# Patient Record
Sex: FEMALE | Race: BLACK OR AFRICAN AMERICAN | Employment: OTHER | URBAN - METROPOLITAN AREA
[De-identification: names, ages, dates, MRNs, and addresses within clinical notes are randomized per-mention and may not be internally consistent; named-entity substitution may affect disease eponyms.]

---

## 2017-02-23 ENCOUNTER — OFFICE VISIT (OUTPATIENT)
Dept: FAMILY MEDICINE CLINIC | Age: 82
End: 2017-02-23

## 2017-02-23 VITALS
HEART RATE: 60 BPM | OXYGEN SATURATION: 92 % | SYSTOLIC BLOOD PRESSURE: 128 MMHG | HEIGHT: 66 IN | WEIGHT: 191.4 LBS | RESPIRATION RATE: 18 BRPM | BODY MASS INDEX: 30.76 KG/M2 | TEMPERATURE: 98 F | DIASTOLIC BLOOD PRESSURE: 60 MMHG

## 2017-02-23 DIAGNOSIS — K64.4 EXTERNAL HEMORRHOID: ICD-10-CM

## 2017-02-23 DIAGNOSIS — K62.5 RECTAL BLEEDING: Primary | ICD-10-CM

## 2017-02-23 RX ORDER — DOCUSATE SODIUM 100 MG/1
100 CAPSULE, LIQUID FILLED ORAL 2 TIMES DAILY
Qty: 30 CAP | Refills: 5 | Status: SHIPPED | OUTPATIENT
Start: 2017-02-23 | End: 2017-05-24

## 2017-02-23 NOTE — PROGRESS NOTES
HISTORY OF PRESENT ILLNESS  Muriel Damian is a 80 y.o. female. HPI Has been having some blood in stools since Tuesday. Passed a fair amount on several occasions. Went to ER on Tuesday. No abdominal pain. Feels a lump on rectal area. Had a colonoscopy in 2007, says that they couldn't get as far as they wanted to. No lightheadedness or dizziness. ROS    Physical Exam   Constitutional: She is oriented to person, place, and time. She appears well-developed and well-nourished. Neck: No thyromegaly present. Cardiovascular: Normal rate, regular rhythm and normal heart sounds. No murmur heard. Pulmonary/Chest: Effort normal and breath sounds normal. She has no wheezes. Abdominal: Soft. Bowel sounds are normal. She exhibits no distension. There is no tenderness. There is no rebound and no guarding. Genitourinary:   Genitourinary Comments: Rectal- thrombosed ext. hemorrhoid   Musculoskeletal: Normal range of motion. She exhibits no edema. Lymphadenopathy:     She has no cervical adenopathy. Neurological: She is alert and oriented to person, place, and time. Nursing note and vitals reviewed. ASSESSMENT and PLAN  Orders Placed This Encounter    REFERRAL TO GASTROENTEROLOGY    AMB POC COMPLETE CBC, AUTOMATED    docusate sodium (COLACE) 100 mg capsule     Adela was seen today for rectal bleeding. Diagnoses and all orders for this visit:    Rectal bleeding  -     AMB POC COMPLETE CBC, AUTOMATED  -     REFERRAL TO GASTROENTEROLOGY    External hemorrhoid    Other orders  -     docusate sodium (COLACE) 100 mg capsule; Take 1 Cap by mouth two (2) times a day for 90 days.       Follow-up Disposition: Not on File

## 2017-02-23 NOTE — PROGRESS NOTES
Chief Complaint   Patient presents with    Rectal Bleeding     Pt stated she was straining having a bowel movement on Tuesday and noticed afterwards when wiping that she had blood coming from rectum. .     Pt stated she only notice alittle bit today when wiped. Pt didn't she hasnt had  A hx of hemmorhoids before. Pt stated she has been straining lately when having bowel movements and would like stool softner. Discussed health maintenance with pt. Pt aware eye exam is due.

## 2017-03-01 ENCOUNTER — TELEPHONE (OUTPATIENT)
Dept: FAMILY MEDICINE CLINIC | Age: 82
End: 2017-03-01

## 2017-03-01 NOTE — TELEPHONE ENCOUNTER
Pt was talking to a nurse, but she do not know which nurse she was talking to, but she would like a call back.

## 2017-05-25 ENCOUNTER — HOSPITAL ENCOUNTER (OUTPATIENT)
Dept: LAB | Age: 82
Discharge: HOME | End: 2017-05-25
Payer: MEDICARE

## 2017-05-25 ENCOUNTER — OFFICE VISIT (OUTPATIENT)
Dept: FAMILY MEDICINE CLINIC | Age: 82
End: 2017-05-25

## 2017-05-25 VITALS
WEIGHT: 195.4 LBS | DIASTOLIC BLOOD PRESSURE: 58 MMHG | HEART RATE: 68 BPM | BODY MASS INDEX: 31.4 KG/M2 | RESPIRATION RATE: 14 BRPM | TEMPERATURE: 100.3 F | SYSTOLIC BLOOD PRESSURE: 134 MMHG | HEIGHT: 66 IN | OXYGEN SATURATION: 96 %

## 2017-05-25 DIAGNOSIS — M25.572 ACUTE LEFT ANKLE PAIN: Primary | ICD-10-CM

## 2017-05-25 PROCEDURE — 36415 COLL VENOUS BLD VENIPUNCTURE: CPT

## 2017-05-25 PROCEDURE — 84550 ASSAY OF BLOOD/URIC ACID: CPT

## 2017-05-25 RX ORDER — ALLOPURINOL 100 MG/1
100 TABLET ORAL DAILY
Qty: 90 TAB | Refills: 3 | Status: SHIPPED | OUTPATIENT
Start: 2017-05-25 | End: 2018-07-27 | Stop reason: SDUPTHER

## 2017-05-25 RX ORDER — PREDNISONE 20 MG/1
20 TABLET ORAL 2 TIMES DAILY
Qty: 10 TAB | Refills: 0 | Status: SHIPPED | OUTPATIENT
Start: 2017-05-25 | End: 2017-07-06 | Stop reason: ALTCHOICE

## 2017-05-25 RX ORDER — POLYETHYLENE GLYCOL 3350 17 G/17G
POWDER, FOR SOLUTION ORAL
Refills: 3 | COMMUNITY
Start: 2017-03-03 | End: 2018-11-29 | Stop reason: ALTCHOICE

## 2017-05-25 RX ORDER — HYDROCORTISONE 25 MG/G
CREAM TOPICAL
Refills: 0 | COMMUNITY
Start: 2017-02-21 | End: 2018-11-29 | Stop reason: ALTCHOICE

## 2017-05-25 NOTE — MR AVS SNAPSHOT
Visit Information Date & Time Provider Department Dept. Phone Encounter #  
 5/25/2017  4:00 PM Armani Lindquist MD [facility] [phone] [id number] Your Appointments 6/15/2017  9:45 AM  
ROUTINE CARE with Armani Lindquist MD  
[facility]) Appt Note: 6m f/u  
 [address] [facility] [address] [phone] [address] [place] [address] [address] Upcoming Health Maintenance Date Due  
 EYE EXAM RETINAL OR DILATED Q1 8/11/2015 GLAUCOMA SCREENING Q2Y 8/11/2016 MEDICARE YEARLY EXAM 12/17/2016 HEMOGLOBIN A1C Q6M 6/15/2017 INFLUENZA AGE 9 TO ADULT 8/1/2017 FOOT EXAM Q1 12/15/2017 MICROALBUMIN Q1 12/15/2017 LIPID PANEL Q1 12/15/2017 DTaP/Tdap/Td series (2 - Td) 6/16/2026 Allergies as of 5/25/2017  Review Complete On: 5/25/2017 By: Armani Lindquist MD  
  
 Severity Noted Reaction Type Reactions Contrast Agent [Iodine]  05/01/2012    Other (comments) Affects kidneys Morphine  05/01/2012    Other (comments) Pt does not know the reaction Current Immunizations  Reviewed on 12/17/2015 Name Date Pneumococcal Vaccine (Unspecified Type) 9/1/2011 Not reviewed this visit You Were Diagnosed With   
  
 Codes Comments Acute left ankle pain    -  Primary ICD-10-CM: M25.572 ICD-9-CM: 719.47 Vitals BP Pulse Temp Resp Height(growth percentile) Weight(growth percentile) 134/58 68 100.3 °F (37.9 °C) (Oral) 14 5' 6\" (1.676 m) 195 lb 6.4 oz (88.6 kg) SpO2 BMI OB Status Smoking Status 96% 31.54 kg/m2 Hysterectomy Never Smoker Vitals History BMI and BSA Data Body Mass Index Body Surface Area 31.54 kg/m 2 2.03 m 2 Preferred Pharmacy Pharmacy Name Phone [facility] [address] [place] [address] [phone] Your Updated Medication List  
  
   
 This list is accurate as of: 5/25/17  4:58 PM.  Always use your most recent med list.  
  
  
  
  
 acetaminophen-codeine 300-30 mg per tablet Commonly known as:  TYLENOL #3  
TAKE 1 TO 2 TABLETS BY MOUTH EVERY 6 HOURS AS NEEDED FOR PAIN  
  
 allopurinol 100 mg tablet Commonly known as:  Judy Roberto Take 1 Tab by mouth daily. To prevent gout  
  
 amLODIPine 10 mg tablet Commonly known as:  Eward Jeannine Take 1 Tab by mouth daily. TAKE 1 TABLET BY MOUTH EVERY DAY  
  
 aspirin 81 mg tablet Take 81 mg by mouth. atorvastatin 20 mg tablet Commonly known as:  LIPITOR Take 1 Tab by mouth daily. For cholesterol  
  
 chlorthalidone 25 mg tablet Commonly known as:  Lennart Specter One daily for blood pressure  
  
 clotrimazole-betamethasone topical cream  
Commonly known as:  Servando Ort Apply to affected areas twice daily  
  
 hydrocortisone 2.5 % topical cream  
Commonly known as:  HYTONE  
APPLY 3 TIMES DAILY FOR 14 DAYS AS NEEDED FOR RECTAL PAIN  
  
 insulin  unit/mL injection Commonly known as:  NovoLIN N  
INJECT 18 UNITS SUBQ IN THE MORNING AND 9 UNITS IN THE EVENING Insulin Syringe-Needle U-100 1/2 mL 30 gauge x 1/2\" Syrg Commonly known as:  BD INSULIN SYRINGE ULTRA-FINE  
USE AS DIRECTED WITH INSULIN  
  
 losartan 100 mg tablet Commonly known as:  COZAAR  
TAKE 1 TABLET BY MOUTH EVERY DAY  
  
 polyethylene glycol 17 gram/dose powder Commonly known as:  Beuford Hallmark TAKE 17GRAMS (1 CAPFUL) DAILY MIXED INTO 6-8 OUNCES OF ATER OR JUICE  
  
 predniSONE 20 mg tablet Commonly known as:  Tanja Frost Take 1 Tab by mouth two (2) times a day. Prescriptions Sent to Pharmacy Refills  
 allopurinol (ZYLOPRIM) 100 mg tablet 3 Sig: Take 1 Tab by mouth daily. To prevent gout Class: Normal  
 Pharmacy: [address] [place] [address], [address] [place] [address] Ph #: [phone]  Route: Oral  
 predniSONE (DELTASONE) 20 mg tablet 0  
 Sig: Take 1 Tab by mouth two (2) times a day. Class: Normal  
 Pharmacy: [address] [place] [address], [address] [place] [address] #: [phone] Route: Oral  
  
We Performed the Following URIC ACID [id number] CPT(R)] Introducing [facility] SERVICES! Dear Celestino Amato: Thank you for requesting a [employer] account. Our records indicate that you have previously registered for a [employer] account but its currently inactive. Please call our [employer] support line at [phone]. Additional Information If you have questions, please visit the Frequently Asked Questions section of the [employer] website at [email or web address] Remember, [employer] is NOT to be used for urgent needs. For medical emergencies, dial 911. Now available from your iPhone and Android! Please provide this summary of care documentation to your next provider. Your primary care clinician is listed as Jerad Rowan. If you have any questions after today's visit, please call [phone].

## 2017-05-25 NOTE — PROGRESS NOTES
Chief Complaint   Patient presents with    Foot Pain     Hurts to walk    Fever     temp 100.3 oral.  upon arrival    Ankle swelling    Insomnia     pain is affectin pt's sleep     1. Have you been to the ER, urgent care clinic since your last visit? Hospitalized since your last visit? No    2. Have you seen or consulted any other health care providers outside of the [address] [place] [address] since your last visit? Include any pap smears or colon screening. No     Health Maintenance Due   Topic Date Due    EYE EXAM RETINAL OR DILATED Q1  08/11/2015    GLAUCOMA SCREENING Q2Y  08/11/2016    MEDICARE YEARLY EXAM  12/17/2016    HEMOGLOBIN A1C Q6M  06/15/2017       Pt wants to focus on the foot pain issue.   Pt expressed interest in retinal scan for the next visit in June 2017

## 2017-05-25 NOTE — PROGRESS NOTES
HISTORY OF PRESENT ILLNESS  Fazal Mtz is a 80 y.o. female. HPI Has been having pain and swelling in L foot since Tuesday, no hx trauma. Has been dxed with the gout in R foot a year ago. Lower leg is hurting also. Tylenol #3- no relief. Has also been running a low grade fever. No other joints are hurting. Went to Dr. Graciela Aviles office today, had a vascular test today. Sees him on Tuesday. ROS    Physical Exam   Constitutional: She is oriented to person, place, and time. She appears well-developed and well-nourished. Neck: No thyromegaly present. Cardiovascular: Normal rate, regular rhythm and normal heart sounds. No murmur heard. Pulmonary/Chest: Effort normal and breath sounds normal. She has no wheezes. Abdominal: Soft. Bowel sounds are normal. She exhibits no distension. There is no tenderness. There is no rebound and no guarding. Musculoskeletal: Normal range of motion. She exhibits no edema. L ankle- marked swelling, tenderness   Lymphadenopathy:     She has no cervical adenopathy. Neurological: She is alert and oriented to person, place, and time. Nursing note and vitals reviewed. ASSESSMENT and PLAN  Orders Placed This Encounter    URIC ACID    allopurinol (ZYLOPRIM) 100 mg tablet    predniSONE (DELTASONE) 20 mg tablet     Adela was seen today for foot pain, fever, ankle swelling and insomnia. Diagnoses and all orders for this visit:    Acute left ankle pain  -     URIC ACID    Other orders  -     allopurinol (ZYLOPRIM) 100 mg tablet; Take 1 Tab by mouth daily. To prevent gout  -     predniSONE (DELTASONE) 20 mg tablet; Take 1 Tab by mouth two (2) times a day.       Follow-up Disposition: Not on File

## 2017-05-26 LAB — URATE SERPL-MCNC: 7.7 MG/DL (ref 2.5–7.1)

## 2017-06-13 RX ORDER — AMLODIPINE BESYLATE 10 MG/1
10 TABLET ORAL DAILY
Qty: 90 TAB | Refills: 3 | Status: SHIPPED | OUTPATIENT
Start: 2017-06-13 | End: 2018-06-12 | Stop reason: SDUPTHER

## 2017-06-13 RX ORDER — AMLODIPINE BESYLATE 10 MG/1
10 TABLET ORAL DAILY
Qty: 90 TAB | Refills: 3 | Status: CANCELLED | OUTPATIENT
Start: 2017-06-13

## 2017-07-06 ENCOUNTER — HOSPITAL ENCOUNTER (OUTPATIENT)
Dept: LAB | Age: 82
Discharge: HOME | End: 2017-07-06
Payer: MEDICARE

## 2017-07-06 ENCOUNTER — OFFICE VISIT (OUTPATIENT)
Dept: FAMILY MEDICINE CLINIC | Age: 82
End: 2017-07-06

## 2017-07-06 VITALS
OXYGEN SATURATION: 94 % | HEIGHT: 66 IN | RESPIRATION RATE: 14 BRPM | TEMPERATURE: 97 F | SYSTOLIC BLOOD PRESSURE: 143 MMHG | BODY MASS INDEX: 31.02 KG/M2 | HEART RATE: 61 BPM | WEIGHT: 193 LBS | DIASTOLIC BLOOD PRESSURE: 65 MMHG

## 2017-07-06 DIAGNOSIS — M48.07 SPINAL STENOSIS OF LUMBOSACRAL REGION: ICD-10-CM

## 2017-07-06 DIAGNOSIS — E78.00 HYPERCHOLESTEROLEMIA: ICD-10-CM

## 2017-07-06 DIAGNOSIS — I10 ESSENTIAL HYPERTENSION: ICD-10-CM

## 2017-07-06 DIAGNOSIS — E11.9 TYPE 2 DIABETES MELLITUS WITHOUT COMPLICATION, WITH LONG-TERM CURRENT USE OF INSULIN (HCC): Primary | ICD-10-CM

## 2017-07-06 DIAGNOSIS — Z79.4 TYPE 2 DIABETES MELLITUS WITHOUT COMPLICATION, WITH LONG-TERM CURRENT USE OF INSULIN (HCC): Primary | ICD-10-CM

## 2017-07-06 LAB — HBA1C MFR BLD HPLC: 6.4 %

## 2017-07-06 PROCEDURE — 85025 COMPLETE CBC W/AUTO DIFF WBC: CPT

## 2017-07-06 PROCEDURE — 82043 UR ALBUMIN QUANTITATIVE: CPT

## 2017-07-06 PROCEDURE — 36415 COLL VENOUS BLD VENIPUNCTURE: CPT

## 2017-07-06 PROCEDURE — 80061 LIPID PANEL: CPT

## 2017-07-06 PROCEDURE — 80053 COMPREHEN METABOLIC PANEL: CPT

## 2017-07-06 RX ORDER — DOCUSATE SODIUM 100 MG/1
CAPSULE, LIQUID FILLED ORAL
Refills: 4 | COMMUNITY
Start: 2017-06-19 | End: 2017-07-06 | Stop reason: SDUPTHER

## 2017-07-06 RX ORDER — CHLORTHALIDONE 25 MG/1
TABLET ORAL
Qty: 90 TAB | Refills: 3 | Status: SHIPPED | OUTPATIENT
Start: 2017-07-06 | End: 2017-08-25 | Stop reason: SDUPTHER

## 2017-07-06 RX ORDER — ACETAMINOPHEN AND CODEINE PHOSPHATE 300; 30 MG/1; MG/1
TABLET ORAL
Qty: 90 TAB | Refills: 3 | Status: SHIPPED | OUTPATIENT
Start: 2017-07-06 | End: 2018-07-27 | Stop reason: SDUPTHER

## 2017-07-06 RX ORDER — ATORVASTATIN CALCIUM 20 MG/1
20 TABLET, FILM COATED ORAL DAILY
Qty: 90 TAB | Refills: 3 | Status: SHIPPED | OUTPATIENT
Start: 2017-07-06 | End: 2018-08-06 | Stop reason: SDUPTHER

## 2017-07-06 RX ORDER — DOCUSATE SODIUM 100 MG/1
CAPSULE, LIQUID FILLED ORAL
Qty: 60 CAP | Refills: 12 | Status: SHIPPED | OUTPATIENT
Start: 2017-07-06 | End: 2018-11-29 | Stop reason: ALTCHOICE

## 2017-07-06 NOTE — PROGRESS NOTES
HISTORY OF PRESENT ILLNESS  Tami Thomas is a 80 y.o. female. HPI Pt. Comes in for blood pressure, cholesterol, and diabetes check. No complaints of chest pain, shortness of breath, TIAs, claudication or edema. Has been having painns in posterior thighs for over a month, somewhat worse in the last week. tyl - mild relief. Legs feel tired when walking around kitchen. Also has some lower back pain. No nighttime pain. ROS    Physical Exam   Constitutional: She is oriented to person, place, and time. She appears well-developed and well-nourished. Neck: No thyromegaly present. Cardiovascular: Normal rate, regular rhythm and normal heart sounds. No murmur heard. 1+ dp pulses bilaterally. Pulmonary/Chest: Effort normal and breath sounds normal. She has no wheezes. Abdominal: Soft. Bowel sounds are normal. She exhibits no distension. There is no tenderness. There is no rebound and no guarding. Musculoskeletal: Normal range of motion. She exhibits no edema. Lymphadenopathy:     She has no cervical adenopathy. Neurological: She is alert and oriented to person, place, and time. Nursing note and vitals reviewed. ASSESSMENT and PLAN  Orders Placed This Encounter    CBC WITH AUTOMATED DIFF    METABOLIC PANEL, COMPREHENSIVE    LIPID PANEL    MICROALBUMIN, UR, RAND W/ MICROALBUMIN/CREA RATIO    AMB POC HEMOGLOBIN A1C    HM DIABETES FOOT EXAM    insulin NPH (NOVOLIN N) 100 unit/mL injection    atorvastatin (LIPITOR) 20 mg tablet    chlorthalidone (HYGROTEN) 25 mg tablet    acetaminophen-codeine (TYLENOL #3) 300-30 mg per tablet    docusate sodium (COLACE) 100 mg capsule     Adela was seen today for hypertension, diabetes and cholesterol problem.     Diagnoses and all orders for this visit:    Type 2 diabetes mellitus without complication, with long-term current use of insulin ([facility])  -     CBC WITH AUTOMATED DIFF  -     METABOLIC PANEL, COMPREHENSIVE  -     LIPID PANEL  -     AMB POC HEMOGLOBIN A1C  -     MICROALBUMIN, UR, RAND W/ MICROALBUMIN/CREA RATIO  -     HM DIABETES FOOT EXAM    Hypercholesterolemia    Spinal stenosis of lumbosacral region    Essential hypertension    Other orders  -     insulin NPH (NOVOLIN N) 100 unit/mL injection; Inject 24 units in am and 12 units in pm  -     atorvastatin (LIPITOR) 20 mg tablet; Take 1 Tab by mouth daily. For cholesterol  -     chlorthalidone (HYGROTEN) 25 mg tablet; One daily for blood pressure  -     acetaminophen-codeine (TYLENOL #3) 300-30 mg per tablet; TAKE 1 TO 2 TABLETS BY MOUTH EVERY 6 HOURS AS NEEDED FOR PAIN  -     docusate sodium (COLACE) 100 mg capsule; TAKE 1 CAPSULE BY MOUTH TWICE DAILY      Follow-up Disposition:  Return in about 6 months (around 1/6/2018).

## 2017-07-06 NOTE — MR AVS SNAPSHOT
Visit Information Date & Time Provider Department Dept. Phone Encounter #  
 7/6/2017  9:45 AM Evy Toledo MD [facility] [phone] [id number] Follow-up Instructions Return in about 6 months (around 1/6/2018). Upcoming Health Maintenance Date Due  
 EYE EXAM RETINAL OR DILATED Q1 8/11/2015 GLAUCOMA SCREENING Q2Y 8/11/2016 MEDICARE YEARLY EXAM 12/17/2016 HEMOGLOBIN A1C Q6M 6/15/2017 INFLUENZA AGE 9 TO ADULT 8/1/2017 FOOT EXAM Q1 12/15/2017 MICROALBUMIN Q1 12/15/2017 LIPID PANEL Q1 12/15/2017 DTaP/Tdap/Td series (2 - Td) 6/16/2026 Allergies as of 7/6/2017  Review Complete On: 7/6/2017 By: Evy Toledo MD  
  
 Severity Noted Reaction Type Reactions Contrast Agent [Iodine]  05/01/2012    Other (comments) Affects kidneys Morphine  05/01/2012    Other (comments) Pt does not know the reaction Current Immunizations  Reviewed on 12/17/2015 Name Date Pneumococcal Vaccine (Unspecified Type) 9/1/2011 Not reviewed this visit You Were Diagnosed With   
  
 Codes Comments Type 2 diabetes mellitus without complication, with long-term current use of insulin ([facility])    -  Primary ICD-10-CM: E11.9, Z79.4 ICD-9-CM: 250.00, V58.67 Hypercholesterolemia     ICD-10-CM: E78.00 ICD-9-CM: 272.0 Spinal stenosis of lumbosacral region     ICD-10-CM: M48.07 
ICD-9-CM: 724.02 Essential hypertension     ICD-10-CM: I10 
ICD-9-CM: 401.9 Vitals BP Pulse Temp Resp Height(growth percentile) Weight(growth percentile) 143/65 61 97 °F (36.1 °C) (Oral) 14 5' 6\" (1.676 m) 193 lb (87.5 kg) SpO2 BMI OB Status Smoking Status 94% 31.15 kg/m2 Hysterectomy Never Smoker BMI and BSA Data Body Mass Index Body Surface Area  
 31.15 kg/m 2 2.02 m 2 Preferred Pharmacy [facility] [address] [place] [address] [phone] Your Updated Medication List  
  
   
This list is accurate as of: 7/6/17 10:10 AM.  Always use your most recent med list.  
  
  
  
  
 acetaminophen-codeine 300-30 mg per tablet Commonly known as:  TYLENOL #3  
TAKE 1 TO 2 TABLETS BY MOUTH EVERY 6 HOURS AS NEEDED FOR PAIN  
  
 allopurinol 100 mg tablet Commonly known as:  Cherie Wilmot Take 1 Tab by mouth daily. To prevent gout  
  
 amLODIPine 10 mg tablet Commonly known as:  Barry Shield Take 1 Tab by mouth daily. TAKE 1 TABLET BY MOUTH EVERY DAY  
  
 aspirin 81 mg tablet Take 81 mg by mouth. atorvastatin 20 mg tablet Commonly known as:  LIPITOR Take 1 Tab by mouth daily. For cholesterol  
  
 chlorthalidone 25 mg tablet Commonly known as:  Charlesetta Clear One daily for blood pressure  
  
 clotrimazole-betamethasone topical cream  
Commonly known as:  Dorlene Dear Apply to affected areas twice daily  
  
 docusate sodium 100 mg capsule Commonly known as:  COLACE  
TAKE 1 CAPSULE BY MOUTH TWICE DAILY  
  
 hydrocortisone 2.5 % topical cream  
Commonly known as:  HYTONE  
APPLY 3 TIMES DAILY FOR 14 DAYS AS NEEDED FOR RECTAL PAIN  
  
 insulin  unit/mL injection Commonly known as:  NovoLIN N Inject 24 units in am and 12 units in pm  
  
 Insulin Syringe-Needle U-100 1/2 mL 30 gauge x 1/2\" Syrg Commonly known as:  BD INSULIN SYRINGE ULTRA-FINE  
USE AS DIRECTED WITH INSULIN  
  
 losartan 100 mg tablet Commonly known as:  COZAAR  
TAKE 1 TABLET BY MOUTH EVERY DAY  
  
 polyethylene glycol 17 gram/dose powder Commonly known as:  Po Spaniel TAKE 17GRAMS (1 CAPFUL) DAILY MIXED INTO 6-8 OUNCES OF ATER OR JUICE Prescriptions Printed Refills  
 acetaminophen-codeine (TYLENOL #3) 300-30 mg per tablet 3 Sig: TAKE 1 TO 2 TABLETS BY MOUTH EVERY 6 HOURS AS NEEDED FOR PAIN Class: Print Prescriptions Sent to Pharmacy  Refills  
 insulin NPH (NOVOLIN N) 100 unit/mL injection 12  
 Sig: Inject 24 units in am and 12 units in pm  
 Class: Normal  
 Pharmacy: [facility] [address] Ph #: [phone]  
 atorvastatin (LIPITOR) 20 mg tablet 3 Sig: Take 1 Tab by mouth daily. For cholesterol Class: Normal  
 Pharmacy: [address] [place] [address] Ph #: [phone] Route: Oral  
 chlorthalidone (HYGROTEN) 25 mg tablet 3 Sig: One daily for blood pressure Class: Normal  
 Pharmacy: [place] [address] Ph #: [phone]  
 docusate sodium (COLACE) 100 mg capsule 12 Sig: TAKE 1 CAPSULE BY MOUTH TWICE DAILY Class: Normal  
 Pharmacy: [address] [place] [address] Ph #: [phone] We Performed the Following AMB POC HEMOGLOBIN A1C [66741 CPT(R)] CBC WITH AUTOMATED DIFF [54799 CPT(R)]  DIABETES FOOT EXAM [HM7 Custom] LIPID PANEL [14069 CPT(R)] METABOLIC PANEL, COMPREHENSIVE [69084 CPT(R)] MICROALBUMIN, UR, RAND W/ MICROALBUMIN/CREA RATIO [id number] CPT(R)] Follow-up Instructions Return in about 6 months (around 1/6/2018). Introducing [facility] & HEALTH SERVICES! Dear Whitney Bernal: Thank you for requesting a [employer] account. Our records indicate that you have previously registered for a [employer] account but its currently inactive. Please call our [employer] support line at [phone]. Additional Information If you have questions, please visit the Frequently Asked Questions section of the [employer] website at [email or web address] Remember, [employer] is NOT to be used for urgent needs. For medical emergencies, dial 911. Now available from your iPhone and Android! Please provide this summary of care documentation to your next provider. Your primary care clinician is listed as Joe Bowers.  If you have any questions after today's visit, please call [phone].

## 2017-07-06 NOTE — PROGRESS NOTES
Chief Complaint   Patient presents with    Hypertension    Diabetes    Cholesterol Problem     1. Have you been to the ER, urgent care clinic since your last visit? Hospitalized since your last visit? No    2. Have you seen or consulted any other health care providers outside of the [address] [place] [address] since your last visit? Include any pap smears or colon screening.  No     Health Maintenance Due   Topic Date Due    EYE EXAM RETINAL OR DILATED Q1  08/11/2015    GLAUCOMA SCREENING Q2Y  08/11/2016    MEDICARE YEARLY EXAM  12/17/2016    HEMOGLOBIN A1C Q6M  06/15/2017

## 2017-07-07 LAB
ALBUMIN SERPL-MCNC: 4.3 G/DL (ref 3.5–4.7)
ALBUMIN/CREAT UR: 2.6 MG/G CREAT (ref 0–30)
ALBUMIN/GLOB SERPL: 1.3 {RATIO} (ref 1.2–2.2)
ALP SERPL-CCNC: 101 IU/L (ref 39–117)
ALT SERPL-CCNC: 13 IU/L (ref 0–32)
AST SERPL-CCNC: 20 IU/L (ref 0–40)
BASOPHILS # BLD AUTO: 0 X10E3/UL (ref 0–0.2)
BASOPHILS NFR BLD AUTO: 0 %
BILIRUB SERPL-MCNC: 0.6 MG/DL (ref 0–1.2)
BUN SERPL-MCNC: 30 MG/DL (ref 8–27)
BUN/CREAT SERPL: 20 (ref 12–28)
CALCIUM SERPL-MCNC: 9.7 MG/DL (ref 8.7–10.3)
CHLORIDE SERPL-SCNC: 98 MMOL/L (ref 96–106)
CHOLEST SERPL-MCNC: 140 MG/DL (ref 100–199)
CO2 SERPL-SCNC: 24 MMOL/L (ref 18–29)
CREAT SERPL-MCNC: 1.5 MG/DL (ref 0.57–1)
CREAT UR-MCNC: 120.9 MG/DL
EOSINOPHIL # BLD AUTO: 0.1 X10E3/UL (ref 0–0.4)
EOSINOPHIL NFR BLD AUTO: 2 %
ERYTHROCYTE [DISTWIDTH] IN BLOOD BY AUTOMATED COUNT: 16.6 % (ref 12.3–15.4)
GLOBULIN SER CALC-MCNC: 3.4 G/DL (ref 1.5–4.5)
GLUCOSE SERPL-MCNC: 81 MG/DL (ref 65–99)
HCT VFR BLD AUTO: 39.1 % (ref 34–46.6)
HDLC SERPL-MCNC: 50 MG/DL
HGB BLD-MCNC: 12.4 G/DL (ref 11.1–15.9)
IMM GRANULOCYTES # BLD: 0 X10E3/UL (ref 0–0.1)
IMM GRANULOCYTES NFR BLD: 0 %
INTERPRETATION, 910389: NORMAL
INTERPRETATION: NORMAL
LDLC SERPL CALC-MCNC: 69 MG/DL (ref 0–99)
LYMPHOCYTES # BLD AUTO: 3.1 X10E3/UL (ref 0.7–3.1)
LYMPHOCYTES NFR BLD AUTO: 44 %
Lab: NORMAL
Lab: NORMAL
MCH RBC QN AUTO: 28.2 PG (ref 26.6–33)
MCHC RBC AUTO-ENTMCNC: 31.7 G/DL (ref 31.5–35.7)
MCV RBC AUTO: 89 FL (ref 79–97)
MICROALBUMIN UR-MCNC: 3.1 UG/ML
MONOCYTES # BLD AUTO: 0.4 X10E3/UL (ref 0.1–0.9)
MONOCYTES NFR BLD AUTO: 6 %
NEUTROPHILS # BLD AUTO: 3.5 X10E3/UL (ref 1.4–7)
NEUTROPHILS NFR BLD AUTO: 48 %
PDF IMAGE, 910387: NORMAL
PLATELET # BLD AUTO: 305 X10E3/UL (ref 150–379)
POTASSIUM SERPL-SCNC: 3.9 MMOL/L (ref 3.5–5.2)
PROT SERPL-MCNC: 7.7 G/DL (ref 6–8.5)
RBC # BLD AUTO: 4.4 X10E6/UL (ref 3.77–5.28)
SODIUM SERPL-SCNC: 141 MMOL/L (ref 134–144)
TRIGL SERPL-MCNC: 106 MG/DL (ref 0–149)
VLDLC SERPL CALC-MCNC: 21 MG/DL (ref 5–40)
WBC # BLD AUTO: 7.2 X10E3/UL (ref 3.4–10.8)

## 2017-08-03 ENCOUNTER — HOSPITAL ENCOUNTER (OUTPATIENT)
Dept: LAB | Age: 82
Discharge: HOME | End: 2017-08-03
Payer: MEDICARE

## 2017-08-03 ENCOUNTER — OFFICE VISIT (OUTPATIENT)
Dept: FAMILY MEDICINE CLINIC | Age: 82
End: 2017-08-03

## 2017-08-03 VITALS
HEIGHT: 66 IN | HEART RATE: 60 BPM | BODY MASS INDEX: 30.22 KG/M2 | RESPIRATION RATE: 20 BRPM | TEMPERATURE: 97.7 F | WEIGHT: 188 LBS | DIASTOLIC BLOOD PRESSURE: 66 MMHG | SYSTOLIC BLOOD PRESSURE: 128 MMHG | OXYGEN SATURATION: 92 %

## 2017-08-03 DIAGNOSIS — K59.00 CONSTIPATION, UNSPECIFIED CONSTIPATION TYPE: ICD-10-CM

## 2017-08-03 DIAGNOSIS — R10.30 ABDOMINAL PAIN, LOWER: Primary | ICD-10-CM

## 2017-08-03 LAB
BACTERIA UA POCT, BACTPOCT: NORMAL
BILIRUB UR QL STRIP: NEGATIVE
CASTS UA POCT: 0
CLUE CELLS, CLUEPOCT: NEGATIVE
CRYSTALS UA POCT, CRYSPOCT: NEGATIVE
EPITHELIAL CELLS POCT, EPITHPOCT: NORMAL
GLUCOSE UR-MCNC: NEGATIVE MG/DL
KETONES P FAST UR STRIP-MCNC: NORMAL MG/DL
MUCUS UA POCT, MUCPOCT: NORMAL
PH UR STRIP: 5.5 [PH] (ref 4.6–8)
PROTEIN,URINE POC: NORMAL MG/DL
RBC UA POCT, RBCPOCT: 0
SP GR UR STRIP: 1.01 (ref 1–1.03)
TRICH UA POCT, TRICHPOC: NEGATIVE
UA UROBILINOGEN AMB POC: NORMAL (ref 0.2–1)
URINALYSIS CLARITY POC: CLEAR
URINALYSIS COLOR POC: YELLOW
URINE BLOOD POC: NEGATIVE
URINE LEUKOCYTES POC: NEGATIVE
URINE NITRITES POC: NEGATIVE
WBC UA POCT, WBCPOCT: NORMAL
YEAST UA POCT, YEASTPOC: NEGATIVE

## 2017-08-03 PROCEDURE — 82150 ASSAY OF AMYLASE: CPT

## 2017-08-03 PROCEDURE — 83690 ASSAY OF LIPASE: CPT

## 2017-08-03 PROCEDURE — 36415 COLL VENOUS BLD VENIPUNCTURE: CPT

## 2017-08-03 PROCEDURE — 80053 COMPREHEN METABOLIC PANEL: CPT

## 2017-08-03 PROCEDURE — 85025 COMPLETE CBC W/AUTO DIFF WBC: CPT

## 2017-08-03 NOTE — MR AVS SNAPSHOT
Visit Information Date & Time Provider Department Dept. Phone Encounter #  
 8/3/2017 10:00 AM [place] [phone] [id number] Follow-up Instructions Return if symptoms worsen or fail to improve. Upcoming Health Maintenance Date Due  
 EYE EXAM RETINAL OR DILATED Q1 8/11/2015 GLAUCOMA SCREENING Q2Y 8/11/2016 MEDICARE YEARLY EXAM 12/17/2016 INFLUENZA AGE 9 TO ADULT 8/1/2017 HEMOGLOBIN A1C Q6M 1/6/2018 FOOT EXAM Q1 7/6/2018 MICROALBUMIN Q1 7/6/2018 LIPID PANEL Q1 7/6/2018 DTaP/Tdap/Td series (2 - Td) 6/16/2026 Allergies as of 8/3/2017  Review Complete On: 8/3/2017 By: Dawna Harmon LPN Severity Noted Reaction Type Reactions Contrast Agent [Iodine]  05/01/2012    Other (comments) Affects kidneys Morphine  05/01/2012    Other (comments) Pt does not know the reaction Current Immunizations  Reviewed on 12/17/2015 Name Date Pneumococcal Vaccine (Unspecified Type) 9/1/2011 Not reviewed this visit You Were Diagnosed With   
  
 Codes Comments Abdominal pain, lower    -  Primary ICD-10-CM: R10.30 ICD-9-CM: 789.09 Vitals BP Pulse Temp Resp Height(growth percentile) Weight(growth percentile) 128/66 60 97.7 °F (36.5 °C) (Oral) 20 5' 6\" (1.676 m) 188 lb (85.3 kg) SpO2 BMI OB Status Smoking Status 92% 30.34 kg/m2 Hysterectomy Never Smoker Vitals History BMI and BSA Data Body Mass Index Body Surface Area  
 30.34 kg/m 2 1.99 m 2 Preferred Pharmacy Pharmacy Name Phone [facility] [address] [phone] Your Updated Medication List  
  
   
This list is accurate as of: 8/3/17 12:16 PM.  Always use your most recent med list.  
  
  
  
  
 acetaminophen-codeine 300-30 mg per tablet Commonly known as:  TYLENOL #3  
TAKE 1 TO 2 TABLETS BY MOUTH EVERY 6 HOURS AS NEEDED FOR PAIN  
  
 allopurinol 100 mg tablet Commonly known as:  Bonna Adeline Take 1 Tab by mouth daily. To prevent gout  
  
 amLODIPine 10 mg tablet Commonly known as:  Canton Bars Take 1 Tab by mouth daily. TAKE 1 TABLET BY MOUTH EVERY DAY  
  
 aspirin 81 mg tablet Take 81 mg by mouth. atorvastatin 20 mg tablet Commonly known as:  LIPITOR Take 1 Tab by mouth daily. For cholesterol  
  
 chlorthalidone 25 mg tablet Commonly known as:  Tony Pummel One daily for blood pressure  
  
 clotrimazole-betamethasone topical cream  
Commonly known as:  Gulshan Stanton Apply to affected areas twice daily  
  
 docusate sodium 100 mg capsule Commonly known as:  COLACE  
TAKE 1 CAPSULE BY MOUTH TWICE DAILY  
  
 hydrocortisone 2.5 % topical cream  
Commonly known as:  HYTONE  
APPLY 3 TIMES DAILY FOR 14 DAYS AS NEEDED FOR RECTAL PAIN  
  
 insulin  unit/mL injection Commonly known as:  NovoLIN N Inject 24 units in am and 12 units in pm  
  
 Insulin Syringe-Needle U-100 1/2 mL 30 gauge x 1/2\" Syrg Commonly known as:  BD INSULIN SYRINGE ULTRA-FINE  
USE AS DIRECTED WITH INSULIN  
  
 losartan 100 mg tablet Commonly known as:  COZAAR  
TAKE 1 TABLET BY MOUTH EVERY DAY  
  
 polyethylene glycol 17 gram/dose powder Commonly known as:  Marlane Blinks TAKE 17GRAMS (1 CAPFUL) DAILY MIXED INTO 6-8 OUNCES OF ATER OR JUICE We Performed the Following AMB POC URINALYSIS DIP STICK AUTO W/ MICRO  [54747 CPT(R)] AMYLASE [id number] CPT(R)] CBC WITH AUTOMATED DIFF [56868 CPT(R)] LIPASE [id number] CPT(R)] METABOLIC PANEL, COMPREHENSIVE [71427 CPT(R)] Follow-up Instructions Return if symptoms worsen or fail to improve. To-Do List   
 08/03/2017 Imaging:  XR ABD (KUB) Patient Instructions Abdominal Pain: Care Instructions Your Care Instructions Abdominal pain has many possible causes.  Some aren't serious and get better on their own in a few days. Others need more testing and treatment. If your pain continues or gets worse, you need to be rechecked and may need more tests to find out what is wrong. You may need surgery to correct the problem. Don't ignore new symptoms, such as fever, nausea and vomiting, urination problems, pain that gets worse, and dizziness. These may be signs of a more serious problem. Your doctor may have recommended a follow-up visit in the next 8 to 12 hours. If you are not getting better, you may need more tests or treatment. The doctor has checked you carefully, but problems can develop later. If you notice any problems or new symptoms, get medical treatment right away. Follow-up care is a key part of your treatment and safety. Be sure to make and go to all appointments, and call your doctor if you are having problems. It's also a good idea to know your test results and keep a list of the medicines you take. How can you care for yourself at home? · Rest until you feel better. · To prevent dehydration, drink plenty of fluids, enough so that your urine is light yellow or clear like water. Choose water and other caffeine-free clear liquids until you feel better. If you have kidney, heart, or liver disease and have to limit fluids, talk with your doctor before you increase the amount of fluids you drink. · If your stomach is upset, eat mild foods, such as rice, dry toast or crackers, bananas, and applesauce. Try eating several small meals instead of two or three large ones. · Wait until 48 hours after all symptoms have gone away before you have spicy foods, alcohol, and drinks that contain caffeine. · Do not eat foods that are high in fat. · Avoid anti-inflammatory medicines such as aspirin, ibuprofen (Advil, Motrin), and naproxen (Aleve). These can cause stomach upset. Talk to your doctor if you take daily aspirin for another health problem. When should you call for help? Call 911 anytime you think you may need emergency care. For example, call if: 
· You passed out (lost consciousness). · You pass maroon or very bloody stools. · You vomit blood or what looks like coffee grounds. · You have new, severe belly pain. Call your doctor now or seek immediate medical care if: 
· Your pain gets worse, especially if it becomes focused in one area of your belly. · You have a new or higher fever. · Your stools are black and look like tar, or they have streaks of blood. · You have unexpected vaginal bleeding. · You have symptoms of a urinary tract infection. These may include: 
¨ Pain when you urinate. ¨ Urinating more often than usual. 
¨ Blood in your urine. · You are dizzy or lightheaded, or you feel like you may faint. Watch closely for changes in your health, and be sure to contact your doctor if: 
· You are not getting better after 1 day (24 hours). Where can you learn more? Go to http://lynne-varsha.info/. Enter C595 in the search box to learn more about \"Abdominal Pain: Care Instructions. \" Current as of: March 20, 2017 Content Version: 11.3 © 2129-3432 [employer]. Care instructions adapted under license by [employer] (which disclaims liability or warranty for this information). If you have questions about a medical condition or this instruction, always ask your healthcare professional. [place] [address] any warranty or liability for your use of this information. Constipation: Care Instructions Your Care Instructions Constipation means that you have a hard time passing stools (bowel movements). People pass stools from 3 times a day to once every 3 days. What is normal for you may be different. Constipation may occur with pain in the rectum and cramping. The pain may get worse when you try to pass stools.  Sometimes there are small amounts of bright red blood on toilet paper or the surface of stools. This is because of enlarged veins near the rectum (hemorrhoids). A few changes in your diet and lifestyle may help you avoid ongoing constipation. Your doctor may also prescribe medicine to help loosen your stool. Some medicines can cause constipation. These include pain medicines and antidepressants. Tell your doctor about all the medicines you take. Your doctor may want to make a medicine change to ease your symptoms. Follow-up care is a key part of your treatment and safety. Be sure to make and go to all appointments, and call your doctor if you are having problems. It's also a good idea to know your test results and keep a list of the medicines you take. How can you care for yourself at home? · Drink plenty of fluids, enough so that your urine is light yellow or clear like water. If you have kidney, heart, or liver disease and have to limit fluids, talk with your doctor before you increase the amount of fluids you drink. · Include high-fiber foods in your diet each day. These include fruits, vegetables, beans, and whole grains. · Get at least 30 minutes of exercise on most days of the week. Walking is a good choice. You also may want to do other activities, such as running, swimming, cycling, or playing tennis or team sports. · Take a fiber supplement, such as Citrucel or Metamucil, every day. Read and follow all instructions on the label. · Schedule time each day for a bowel movement. A daily routine may help. Take your time having your bowel movement. · Support your feet with a small step stool when you sit on the toilet. This helps flex your hips and places your pelvis in a squatting position. · Your doctor may recommend an over-the-counter laxative to relieve your constipation. Examples are Milk of Magnesia and MiraLax. Read and follow all instructions on the label. Do not use laxatives on a long-term basis. When should you call for help? Call your doctor now or seek immediate medical care if: 
· You have new or worse belly pain. · You have new or worse nausea or vomiting. · You have blood in your stools. Watch closely for changes in your health, and be sure to contact your doctor if: 
· Your constipation is getting worse. · You do not get better as expected. Where can you learn more? Go to http://lynne-varsha.info/. Enter [phone] in the search box to learn more about \"Constipation: Care Instructions. \" Current as of: March 20, 2017 Content Version: 11.3 © 1979-8349 [employer]. Care instructions adapted under license by [employer] (which disclaims liability or warranty for this information). If you have questions about a medical condition or this instruction, always ask your healthcare professional. [address] any warranty or liability for your use of this information. Introducing [facility]! Dear Peter Mcguire: Thank you for requesting a Crysalin account. Our records indicate that you have previously registered for a Crysalin account but its currently inactive. Please call our Crysalin support line at [phone]. Additional Information If you have questions, please visit the Frequently Asked Questions section of the Crysalin website at [email or web address] Remember, Crysalin is NOT to be used for urgent needs. For medical emergencies, dial 911. Now available from your iPhone and Android! Please provide this summary of care documentation to your next provider. Your primary care clinician is listed as Zan Rivers. If you have any questions after today's visit, please call [phone].

## 2017-08-03 NOTE — PATIENT INSTRUCTIONS
Abdominal Pain: Care Instructions  Your Care Instructions    Abdominal pain has many possible causes. Some aren't serious and get better on their own in a few days. Others need more testing and treatment. If your pain continues or gets worse, you need to be rechecked and may need more tests to find out what is wrong. You may need surgery to correct the problem. Don't ignore new symptoms, such as fever, nausea and vomiting, urination problems, pain that gets worse, and dizziness. These may be signs of a more serious problem. Your doctor may have recommended a follow-up visit in the next 8 to 12 hours. If you are not getting better, you may need more tests or treatment. The doctor has checked you carefully, but problems can develop later. If you notice any problems or new symptoms, get medical treatment right away. Follow-up care is a key part of your treatment and safety. Be sure to make and go to all appointments, and call your doctor if you are having problems. It's also a good idea to know your test results and keep a list of the medicines you take. How can you care for yourself at home? · Rest until you feel better. · To prevent dehydration, drink plenty of fluids, enough so that your urine is light yellow or clear like water. Choose water and other caffeine-free clear liquids until you feel better. If you have kidney, heart, or liver disease and have to limit fluids, talk with your doctor before you increase the amount of fluids you drink. · If your stomach is upset, eat mild foods, such as rice, dry toast or crackers, bananas, and applesauce. Try eating several small meals instead of two or three large ones. · Wait until 48 hours after all symptoms have gone away before you have spicy foods, alcohol, and drinks that contain caffeine. · Do not eat foods that are high in fat. · Avoid anti-inflammatory medicines such as aspirin, ibuprofen (Advil, Motrin), and naproxen (Aleve).  These can cause stomach upset. Talk to your doctor if you take daily aspirin for another health problem. When should you call for help? Call 911 anytime you think you may need emergency care. For example, call if:  · You passed out (lost consciousness). · You pass maroon or very bloody stools. · You vomit blood or what looks like coffee grounds. · You have new, severe belly pain. Call your doctor now or seek immediate medical care if:  · Your pain gets worse, especially if it becomes focused in one area of your belly. · You have a new or higher fever. · Your stools are black and look like tar, or they have streaks of blood. · You have unexpected vaginal bleeding. · You have symptoms of a urinary tract infection. These may include:  ¨ Pain when you urinate. ¨ Urinating more often than usual.  ¨ Blood in your urine. · You are dizzy or lightheaded, or you feel like you may faint. Watch closely for changes in your health, and be sure to contact your doctor if:  · You are not getting better after 1 day (24 hours). Where can you learn more? Go to http://lynneRaidarrrvarsha.info/. Enter Y480 in the search box to learn more about \"Abdominal Pain: Care Instructions. \"  Current as of: March 20, 2017  Content Version: 11.3  © 1984-6110 [employer]. Care instructions adapted under license by [employer] (which disclaims liability or warranty for this information). If you have questions about a medical condition or this instruction, always ask your healthcare professional. [place] [address] any warranty or liability for your use of this information. Constipation: Care Instructions  Your Care Instructions  Constipation means that you have a hard time passing stools (bowel movements). People pass stools from 3 times a day to once every 3 days. What is normal for you may be different. Constipation may occur with pain in the rectum and cramping.  The pain may get worse when you try to pass stools. Sometimes there are small amounts of bright red blood on toilet paper or the surface of stools. This is because of enlarged veins near the rectum (hemorrhoids). A few changes in your diet and lifestyle may help you avoid ongoing constipation. Your doctor may also prescribe medicine to help loosen your stool. Some medicines can cause constipation. These include pain medicines and antidepressants. Tell your doctor about all the medicines you take. Your doctor may want to make a medicine change to ease your symptoms. Follow-up care is a key part of your treatment and safety. Be sure to make and go to all appointments, and call your doctor if you are having problems. It's also a good idea to know your test results and keep a list of the medicines you take. How can you care for yourself at home? · Drink plenty of fluids, enough so that your urine is light yellow or clear like water. If you have kidney, heart, or liver disease and have to limit fluids, talk with your doctor before you increase the amount of fluids you drink. · Include high-fiber foods in your diet each day. These include fruits, vegetables, beans, and whole grains. · Get at least 30 minutes of exercise on most days of the week. Walking is a good choice. You also may want to do other activities, such as running, swimming, cycling, or playing tennis or team sports. · Take a fiber supplement, such as Citrucel or Metamucil, every day. Read and follow all instructions on the label. · Schedule time each day for a bowel movement. A daily routine may help. Take your time having your bowel movement. · Support your feet with a small step stool when you sit on the toilet. This helps flex your hips and places your pelvis in a squatting position. · Your doctor may recommend an over-the-counter laxative to relieve your constipation. Examples are Milk of Magnesia and MiraLax. Read and follow all instructions on the label.  Do not use laxatives on a long-term basis. When should you call for help? Call your doctor now or seek immediate medical care if:  · You have new or worse belly pain. · You have new or worse nausea or vomiting. · You have blood in your stools. Watch closely for changes in your health, and be sure to contact your doctor if:  · Your constipation is getting worse. · You do not get better as expected. Where can you learn more? Go to http://lynne-varsha.info/. Enter 21 [phone] in the search box to learn more about \"Constipation: Care Instructions. \"  Current as of: March 20, 2017  Content Version: 11.3  © 2907-7695 [employer]. Care instructions adapted under license by [employer] (which disclaims liability or warranty for this information). If you have questions about a medical condition or this instruction, always ask your healthcare professional. [address] any warranty or liability for your use of this information.

## 2017-08-03 NOTE — PROGRESS NOTES
HISTORY OF PRESENT ILLNESS  Timbo Bey is a 80 y.o. female. HPI  Patient comes in today for  Complains of lower abdominal pain for 2 days. Pain comes and goes. Describes as a sharp gas pain, last for a minute then goes away. Patient states on Tuesday, she had some prune juice. Started vomiting on Tuesday, but pain still in abdomen. Had diarrhea about 2 weeks ago, lasted 3 days, then resolved. Has not had BM in 2-3 days. Patient states she is passing gas. Denies fever, chills. Appetite poor, but not new. Denies blood in stool, melena. Urinating without difficulty, but states she has pain in abd when she urinates, urinary frequency, especially at night. Denies dysuria, burning. Drinks some water. Allergies   Allergen Reactions    Contrast Agent [Iodine] Other (comments)     Affects kidneys    Morphine Other (comments)     Pt does not know the reaction       Past Medical History:   Diagnosis Date    Diabetes ([facility] [address]     Hypercholesterolemia     Hypertension     Other ill-defined conditions     gout    Peripheral vascular disease ([facility] [address]     Refusal of blood transfusions as patient is Cheondoism 2/26/2013       Past Surgical History:   Procedure Laterality Date    CABG, ARTERIAL, FOUR+  2005- Dr. Last Johnson  2014- demi SPARROW popliteal artery    HX CHOLECYSTECTOMY      HX HYSTERECTOMY         Social History     Social History    Marital status:      Spouse name: N/A    Number of children: N/A    Years of education: N/A     Occupational History    Not on file. Social History Main Topics    Smoking status: Never Smoker    Smokeless tobacco: Never Used    Alcohol use No    Drug use: No    Sexual activity: Not on file     Other Topics Concern    Not on file     Social History Narrative    , 2 children, son lives with her at times       No family history on file.     Current Outpatient Prescriptions   Medication Sig    insulin NPH (NOVOLIN N) 100 unit/mL injection Inject 24 units in am and 12 units in pm    atorvastatin (LIPITOR) 20 mg tablet Take 1 Tab by mouth daily. For cholesterol    chlorthalidone (HYGROTEN) 25 mg tablet One daily for blood pressure    acetaminophen-codeine (TYLENOL #3) 300-30 mg per tablet TAKE 1 TO 2 TABLETS BY MOUTH EVERY 6 HOURS AS NEEDED FOR PAIN    docusate sodium (COLACE) 100 mg capsule TAKE 1 CAPSULE BY MOUTH TWICE DAILY    amLODIPine (NORVASC) 10 mg tablet Take 1 Tab by mouth daily. TAKE 1 TABLET BY MOUTH EVERY DAY    polyethylene glycol (MIRALAX) 17 gram/dose powder TAKE 17GRAMS (1 CAPFUL) DAILY MIXED INTO 6-8 OUNCES OF ATER OR JUICE    allopurinol (ZYLOPRIM) 100 mg tablet Take 1 Tab by mouth daily. To prevent gout    losartan (COZAAR) 100 mg tablet TAKE 1 TABLET BY MOUTH EVERY DAY    Insulin Syringe-Needle U-100 (BD INSULIN SYRINGE ULTRA-FINE) 1/2 mL 30 gauge x 1/2\" syrg USE AS DIRECTED WITH INSULIN    aspirin 81 mg tablet Take 81 mg by mouth.  hydrocortisone (HYTONE) 2.5 % topical cream APPLY 3 TIMES DAILY FOR 14 DAYS AS NEEDED FOR RECTAL PAIN    clotrimazole-betamethasone (LOTRISONE) topical cream Apply to affected areas twice daily     No current facility-administered medications for this visit. Review of Systems   Constitutional: Negative for chills, diaphoresis, fever and malaise/fatigue. Respiratory: Negative for cough and shortness of breath. Cardiovascular: Negative for chest pain, palpitations and leg swelling. Gastrointestinal: Positive for abdominal pain, constipation and nausea. Negative for blood in stool, diarrhea, melena and vomiting. Genitourinary: Positive for frequency. Negative for dysuria, flank pain, hematuria and urgency. Skin: Negative. Neurological: Negative for dizziness, tingling, sensory change, speech change, focal weakness and headaches.      Vitals:    08/03/17 1020   BP: 128/66   Pulse: 60   Resp: 20   Temp: 97.7 °F (36.5 °C)   TempSrc: Oral   SpO2: 92% Weight: 188 lb (85.3 kg)   Height: 5' 6\" (1.676 m)     Physical Exam   Constitutional: She is oriented to person, place, and time. Vital signs are normal. She appears well-developed and well-nourished. She is cooperative. Cardiovascular: Normal rate, regular rhythm and normal heart sounds. Pulmonary/Chest: Effort normal and breath sounds normal.   Abdominal: Soft. Bowel sounds are normal. There is tenderness in the right lower quadrant, suprapubic area and left lower quadrant. Neurological: She is alert and oriented to person, place, and time. Skin: Skin is warm and dry. Psychiatric: She has a normal mood and affect. Her behavior is normal. Judgment and thought content normal.   Vitals reviewed. ASSESSMENT and PLAN    ICD-10-CM ICD-9-CM    1. Abdominal pain, lower R10.30 789.09 XR ABD (KUB)      CBC WITH AUTOMATED DIFF      METABOLIC PANEL, COMPREHENSIVE      AMYLASE      LIPASE      AMB POC URINALYSIS DIP STICK AUTO W/ MICRO    2. Constipation, unspecified constipation type K59.00 564.00      Encounter Diagnoses   Name Primary?  Abdominal pain, lower Yes    Constipation, unspecified constipation type      Orders Placed This Encounter    XR ABD (KUB)    CBC WITH AUTOMATED DIFF    METABOLIC PANEL, COMPREHENSIVE    AMYLASE    LIPASE    AMB POC URINALYSIS DIP STICK AUTO W/ MICRO      Diagnoses and all orders for this visit:    1. Abdominal pain, lower - most likely related to constipation. Patient states she had 2 large BMs in office prior to KUB xray, states relieved a lot of her pain. UA negative for UTI. Will check labs. Encouraged patient to take Miralax daily, hold for diarrhea. If no improvement in pain, may need imaging studies and/or referral to GI for colonoscopy  -     XR ABD (KUB); Future  -     CBC WITH AUTOMATED DIFF  -     METABOLIC PANEL, COMPREHENSIVE  -     AMYLASE  -     LIPASE  -     AMB POC URINALYSIS DIP STICK AUTO W/ MICRO     2.  Constipation, unspecified constipation type      Follow-up Disposition:  Return if symptoms worsen or fail to improve.  lab results and schedule of future lab studies reviewed with patient  radiology results and schedule of future radiology studies reviewed with patient    I have reviewed the patient's allergies and made any necessary changes. Medical, procedural, social and family histories have been reviewed and updated as medically indicated. I have reconciled and/or revised patient medications in the EMR. I have discussed each diagnosis listed in this note with Varun Tyler and/or their family. I have discussed treatment options and the risk/benefit analysis of those options, including safe use of medications and possible medication side effects. Through the use of shared decision making we have agreed to the above plan. The patient has received an after-visit summary and questions were answered concerning future plans. Lyssa Munoz, MOOSE-BRIDGETT    This note will not be viewable in [employer].

## 2017-08-04 LAB
ALBUMIN SERPL-MCNC: 4.3 G/DL (ref 3.5–4.7)
ALBUMIN/GLOB SERPL: 1.2 {RATIO} (ref 1.2–2.2)
ALP SERPL-CCNC: 97 IU/L (ref 39–117)
ALT SERPL-CCNC: 13 IU/L (ref 0–32)
AMYLASE SERPL-CCNC: 118 U/L (ref 31–124)
AST SERPL-CCNC: 21 IU/L (ref 0–40)
BASOPHILS # BLD AUTO: 0 X10E3/UL (ref 0–0.2)
BASOPHILS NFR BLD AUTO: 0 %
BILIRUB SERPL-MCNC: 0.5 MG/DL (ref 0–1.2)
BUN SERPL-MCNC: 25 MG/DL (ref 8–27)
BUN/CREAT SERPL: 15 (ref 12–28)
CALCIUM SERPL-MCNC: 9.9 MG/DL (ref 8.7–10.3)
CHLORIDE SERPL-SCNC: 102 MMOL/L (ref 96–106)
CO2 SERPL-SCNC: 26 MMOL/L (ref 18–29)
CREAT SERPL-MCNC: 1.69 MG/DL (ref 0.57–1)
EOSINOPHIL # BLD AUTO: 0.2 X10E3/UL (ref 0–0.4)
EOSINOPHIL NFR BLD AUTO: 2 %
ERYTHROCYTE [DISTWIDTH] IN BLOOD BY AUTOMATED COUNT: 16.9 % (ref 12.3–15.4)
GLOBULIN SER CALC-MCNC: 3.6 G/DL (ref 1.5–4.5)
GLUCOSE SERPL-MCNC: 85 MG/DL (ref 65–99)
HCT VFR BLD AUTO: 38.4 % (ref 34–46.6)
HGB BLD-MCNC: 12.2 G/DL (ref 11.1–15.9)
IMM GRANULOCYTES # BLD: 0 X10E3/UL (ref 0–0.1)
IMM GRANULOCYTES NFR BLD: 0 %
INTERPRETATION: NORMAL
LIPASE SERPL-CCNC: 63 U/L (ref 0–59)
LYMPHOCYTES # BLD AUTO: 3.2 X10E3/UL (ref 0.7–3.1)
LYMPHOCYTES NFR BLD AUTO: 43 %
MCH RBC QN AUTO: 28.4 PG (ref 26.6–33)
MCHC RBC AUTO-ENTMCNC: 31.8 G/DL (ref 31.5–35.7)
MCV RBC AUTO: 90 FL (ref 79–97)
MONOCYTES # BLD AUTO: 0.5 X10E3/UL (ref 0.1–0.9)
MONOCYTES NFR BLD AUTO: 7 %
NEUTROPHILS # BLD AUTO: 3.6 X10E3/UL (ref 1.4–7)
NEUTROPHILS NFR BLD AUTO: 48 %
PLATELET # BLD AUTO: 304 X10E3/UL (ref 150–379)
POTASSIUM SERPL-SCNC: 4.3 MMOL/L (ref 3.5–5.2)
PROT SERPL-MCNC: 7.9 G/DL (ref 6–8.5)
RBC # BLD AUTO: 4.29 X10E6/UL (ref 3.77–5.28)
SODIUM SERPL-SCNC: 143 MMOL/L (ref 134–144)
WBC # BLD AUTO: 7.5 X10E3/UL (ref 3.4–10.8)

## 2017-08-25 DIAGNOSIS — I10 ESSENTIAL HYPERTENSION: Primary | ICD-10-CM

## 2017-08-25 RX ORDER — CHLORTHALIDONE 25 MG/1
TABLET ORAL
Qty: 90 TAB | Refills: 3
Start: 2017-08-25 | End: 2018-09-07 | Stop reason: SDUPTHER

## 2017-08-28 ENCOUNTER — TELEPHONE (OUTPATIENT)
Dept: FAMILY MEDICINE CLINIC | Age: 82
End: 2017-08-28

## 2017-08-28 NOTE — TELEPHONE ENCOUNTER
----- Message from Mateo Boyer sent at 8/28/2017 10:55 AM EDT -----  Regarding: Dr. Palmer Quiles telephone  Contact: [phone]  Pt  Daughter Johan Jin is requesting a call back from nurse asap.  Best contact number is [phone]

## 2017-08-28 NOTE — TELEPHONE ENCOUNTER
Called daughter back. She wants pt to come in sooner instead of Wednesday due to body rash and confusion with meds.  appt changed to 8/29/17 with Lynne Manzano

## 2017-08-29 ENCOUNTER — OFFICE VISIT (OUTPATIENT)
Dept: FAMILY MEDICINE CLINIC | Age: 82
End: 2017-08-29

## 2017-08-29 ENCOUNTER — PATIENT OUTREACH (OUTPATIENT)
Dept: FAMILY MEDICINE CLINIC | Age: 82
End: 2017-08-29

## 2017-08-29 VITALS
TEMPERATURE: 97 F | DIASTOLIC BLOOD PRESSURE: 54 MMHG | RESPIRATION RATE: 16 BRPM | OXYGEN SATURATION: 97 % | HEIGHT: 66 IN | SYSTOLIC BLOOD PRESSURE: 134 MMHG | HEART RATE: 60 BPM | BODY MASS INDEX: 30.44 KG/M2 | WEIGHT: 189.4 LBS

## 2017-08-29 DIAGNOSIS — Z00.00 MEDICARE ANNUAL WELLNESS VISIT, SUBSEQUENT: ICD-10-CM

## 2017-08-29 DIAGNOSIS — I10 ESSENTIAL HYPERTENSION: Primary | ICD-10-CM

## 2017-08-29 DIAGNOSIS — N18.30 CRF (CHRONIC RENAL FAILURE), STAGE 3 (MODERATE) (HCC): ICD-10-CM

## 2017-08-29 RX ORDER — FEXOFENADINE HCL 60 MG
60 TABLET ORAL
Qty: 30 TAB | Refills: 2 | Status: SHIPPED | OUTPATIENT
Start: 2017-08-29 | End: 2017-11-13 | Stop reason: DRUGHIGH

## 2017-08-29 RX ORDER — LOSARTAN POTASSIUM 100 MG/1
TABLET ORAL
Qty: 90 TAB | Refills: 3 | Status: SHIPPED | OUTPATIENT
Start: 2017-08-29 | End: 2018-03-15 | Stop reason: DRUGHIGH

## 2017-08-29 NOTE — PROGRESS NOTES
This is a Subsequent Medicare Annual Wellness Exam (AWV) (Performed 12 months after IPPE or effective date of Medicare Part B enrollment, Once in a lifetime)    I have reviewed the patient's medical history in detail and updated the computerized patient record. History     Past Medical History:   Diagnosis Date    Diabetes ([facility] [address]     Hypercholesterolemia     Hypertension     Other ill-defined conditions     gout    Peripheral vascular disease ([facility] [address]     Refusal of blood transfusions as patient is Taoist 2/26/2013      Past Surgical History:   Procedure Laterality Date    CABG, ARTERIAL, FOUR+  2005- Dr. David José  2014- demi    L popliteal artery    HX CHOLECYSTECTOMY      HX HYSTERECTOMY       Current Outpatient Prescriptions   Medication Sig Dispense Refill    chlorthalidone (HYGROTEN) 25 mg tablet Take 1/2 tab daily for blood pressure 90 Tab 3    insulin NPH (NOVOLIN N) 100 unit/mL injection Inject 24 units in am and 12 units in pm 60 mL 12    atorvastatin (LIPITOR) 20 mg tablet Take 1 Tab by mouth daily. For cholesterol 90 Tab 3    acetaminophen-codeine (TYLENOL #3) 300-30 mg per tablet TAKE 1 TO 2 TABLETS BY MOUTH EVERY 6 HOURS AS NEEDED FOR PAIN 90 Tab 3    docusate sodium (COLACE) 100 mg capsule TAKE 1 CAPSULE BY MOUTH TWICE DAILY 60 Cap 12    amLODIPine (NORVASC) 10 mg tablet Take 1 Tab by mouth daily. TAKE 1 TABLET BY MOUTH EVERY DAY 90 Tab 3    hydrocortisone (HYTONE) 2.5 % topical cream APPLY 3 TIMES DAILY FOR 14 DAYS AS NEEDED FOR RECTAL PAIN  0    polyethylene glycol (MIRALAX) 17 gram/dose powder TAKE 17GRAMS (1 CAPFUL) DAILY MIXED INTO 6-8 OUNCES OF ATER OR JUICE  3    allopurinol (ZYLOPRIM) 100 mg tablet Take 1 Tab by mouth daily.  To prevent gout 90 Tab 3    clotrimazole-betamethasone (LOTRISONE) topical cream Apply to affected areas twice daily 60 g 5    losartan (COZAAR) 100 mg tablet TAKE 1 TABLET BY MOUTH EVERY DAY 90 Tab 3    Insulin Syringe-Needle U-100 (BD INSULIN SYRINGE ULTRA-FINE) 1/2 mL 30 gauge x 1/2\" syrg USE AS DIRECTED WITH INSULIN 100 Syringe 3    aspirin 81 mg tablet Take 81 mg by mouth. Allergies   Allergen Reactions    Contrast Agent [Iodine] Other (comments)     Affects kidneys    Morphine Other (comments)     Pt does not know the reaction     History reviewed. No pertinent family history. Social History   Substance Use Topics    Smoking status: Never Smoker    Smokeless tobacco: Never Used    Alcohol use No     Patient Active Problem List   Diagnosis Code    Hypertension I10    Hypercholesterolemia E78.00    Diabetes mellitus ([facility] [address] E11.9    Osteoarthritis M19.90    Coronary artery disease I25.10    Refusal of blood transfusions as patient is Latter day Z53.1       Depression Risk Factor Screening:     PHQ over the last two weeks 8/29/2017   Little interest or pleasure in doing things Not at all   Feeling down, depressed or hopeless Not at all   Total Score PHQ 2 0     Alcohol Risk Factor Screening: You do not drink alcohol or very rarely. Functional Ability and Level of Safety:     Hearing Loss  Hearing is good. Activities of Daily Living  The home contains: grab [place]  Patient needs help with:  transportation and walking    Fall RiskFall Risk Assessment, last 12 mths 8/29/2017   Able to walk? Yes   Fall in past 12 months? No       Abuse Screen  Patient is not abused    Cognitive Screening   Evaluation of Cognitive Function:  Has your family/caregiver stated any concerns about your memory: no-short term memory-forget what she went to get.   Normal    Patient Care Team   Patient Care Team:  Sunni Berrios MD as PCP - General (Family Practice)  [place] [address] Nurse Navigator  Advice/Referrals/Counseling   Education and counseling provided:  Are appropriate based on today's review and evaluation  Pneumococcal Vaccine  Screening for glaucoma  Zoster Vaccine-declined      Assessment/Plan     As directed by Dr Alfred Ortega

## 2017-08-29 NOTE — PROGRESS NOTES
HISTORY OF PRESENT ILLNESS  Caryle Fast is a 80 y.o. female. HPI Brought in by daughter Mohini Stafford. Has been told to stop a bp med, but not sure which one. ROS    Physical Exam   Constitutional: She is oriented to person, place, and time. She appears well-developed and well-nourished. Neck: No thyromegaly present. Cardiovascular: Normal rate, regular rhythm and normal heart sounds. No murmur heard. Pulmonary/Chest: Effort normal and breath sounds normal. She has no wheezes. Abdominal: Soft. Bowel sounds are normal. She exhibits no distension. There is no tenderness. There is no rebound and no guarding. Musculoskeletal: Normal range of motion. She exhibits no edema. Lymphadenopathy:     She has no cervical adenopathy. Neurological: She is alert and oriented to person, place, and time. Nursing note and vitals reviewed. ASSESSMENT and PLAN  Orders Placed This Encounter    losartan (COZAAR) 100 mg tablet    fexofenadine (ALLEGRA) 60 mg tablet     Diagnoses and all orders for this visit:    1. Essential hypertension    2. CRF (chronic renal failure), stage 3 (moderate)    3. Medicare annual wellness visit, subsequent    Other orders  -     losartan (COZAAR) 100 mg tablet; TAKE 1 TABLET BY MOUTH EVERY DAY  -     fexofenadine (ALLEGRA) 60 mg tablet; Take 1 Tab by mouth daily as needed for Itching.       Follow-up Disposition: Not on File

## 2017-08-29 NOTE — PATIENT INSTRUCTIONS

## 2017-08-29 NOTE — PROGRESS NOTES
Chief Complaint   Patient presents with    Skin Problem     x 2 weeks itching on arms and back, legs, stomach     ED Follow-up     Neck stiffness [facility]  Aug 6, 2017     1. Have you been to the ER, urgent care clinic since your last visit? Hospitalized since your last visit? See chief complaint    2. Have you seen or consulted any other health care providers outside of the [address] [place] [address] since your last visit? Include any pap smears or colon screening.  No     Health Maintenance Due   Topic Date Due    EYE EXAM RETINAL OR DILATED Q1  08/11/2015    GLAUCOMA SCREENING Q2Y  08/11/2016       Pt refused retinal scan

## 2017-08-29 NOTE — MR AVS SNAPSHOT
Visit Information Date & Time Provider Department Dept. Phone Encounter #  
 8/29/2017 11:30 AM Josh Parmar MD [facility] [phone] [id number] Upcoming Health Maintenance Date Due  
 EYE EXAM RETINAL OR DILATED Q1 8/11/2015 GLAUCOMA SCREENING Q2Y 8/11/2016 HEMOGLOBIN A1C Q6M 1/6/2018 FOOT EXAM Q1 7/6/2018 MICROALBUMIN Q1 7/6/2018 LIPID PANEL Q1 7/6/2018 MEDICARE YEARLY EXAM 8/30/2018 DTaP/Tdap/Td series (2 - Td) 6/16/2026 Allergies as of 8/29/2017  Review Complete On: 8/29/2017 By: Josh Parmar MD  
  
 Severity Noted Reaction Type Reactions Contrast Agent [Iodine]  05/01/2012    Other (comments) Affects kidneys Morphine  05/01/2012    Other (comments) Pt does not know the reaction Current Immunizations  Reviewed on 12/17/2015 Name Date Pneumococcal Vaccine (Unspecified Type) 9/1/2011 Not reviewed this visit Vitals BP Pulse Temp Resp Height(growth percentile) Weight(growth percentile) 134/54 60 97 °F (36.1 °C) (Oral) 16 5' 6\" (1.676 m) 189 lb 6.4 oz (85.9 kg) SpO2 BMI OB Status Smoking Status 97% 30.57 kg/m2 Hysterectomy Never Smoker Vitals History BMI and BSA Data Body Mass Index Body Surface Area 30.57 kg/m 2 2 m 2 Preferred Pharmacy Pharmacy Name Phone [facility] [address] [place] [address] [phone] Your Updated Medication List  
  
   
This list is accurate as of: 8/29/17  1:38 PM.  Always use your most recent med list.  
  
  
  
  
 acetaminophen-codeine 300-30 mg per tablet Commonly known as:  TYLENOL #3  
TAKE 1 TO 2 TABLETS BY MOUTH EVERY 6 HOURS AS NEEDED FOR PAIN  
  
 allopurinol 100 mg tablet Commonly known as:  Vena Ditch Take 1 Tab by mouth daily. To prevent gout  
  
 amLODIPine 10 mg tablet Commonly known as:  Jose Matar Take 1 Tab by mouth daily.  TAKE 1 TABLET BY MOUTH EVERY DAY  
  
 aspirin 81 mg tablet Take 81 mg by mouth. atorvastatin 20 mg tablet Commonly known as:  LIPITOR Take 1 Tab by mouth daily. For cholesterol  
  
 chlorthalidone 25 mg tablet Commonly known as:  Pennye Durie Take 1/2 tab daily for blood pressure  
  
 clotrimazole-betamethasone topical cream  
Commonly known as:  Irven Janet Apply to affected areas twice daily  
  
 docusate sodium 100 mg capsule Commonly known as:  COLACE  
TAKE 1 CAPSULE BY MOUTH TWICE DAILY  
  
 fexofenadine 60 mg tablet Commonly known as:  Rachel Kervin Take 1 Tab by mouth daily as needed for Itching.  
  
 hydrocortisone 2.5 % topical cream  
Commonly known as:  HYTONE  
APPLY 3 TIMES DAILY FOR 14 DAYS AS NEEDED FOR RECTAL PAIN  
  
 insulin  unit/mL injection Commonly known as:  NovoLIN N Inject 24 units in am and 12 units in pm  
  
 Insulin Syringe-Needle U-100 1/2 mL 30 gauge x 1/2\" Syrg Commonly known as:  BD INSULIN SYRINGE ULTRA-FINE  
USE AS DIRECTED WITH INSULIN  
  
 losartan 100 mg tablet Commonly known as:  COZAAR  
TAKE 1 TABLET BY MOUTH EVERY DAY  
  
 polyethylene glycol 17 gram/dose powder Commonly known as:  Lawerance Amas TAKE 17GRAMS (1 CAPFUL) DAILY MIXED INTO 6-8 OUNCES OF ATER OR JUICE Prescriptions Printed Refills  
 fexofenadine (ALLEGRA) 60 mg tablet 2 Sig: Take 1 Tab by mouth daily as needed for Itching. Class: Print Route: Oral  
  
Prescriptions Sent to Pharmacy Refills  
 losartan (COZAAR) 100 mg tablet 3 Sig: TAKE 1 TABLET BY MOUTH EVERY DAY Class: Normal  
 Pharmacy: [address] [place] [address], [address] [place] [address] Ph #: [phone] Patient Instructions Medicare Wellness Visit, Female The best way to live healthy is to have a healthy lifestyle by eating a well-balanced diet, exercising regularly, limiting alcohol and stopping smoking.  
 
Regular physical exams and screening tests are another way to keep healthy. Preventive exams provided by your health care provider can find health problems before they become diseases or illnesses. Preventive services including immunizations, screening tests, monitoring and exams can help you take care of your own health. All people over age 72 should have a pneumovax  and and a prevnar shot to prevent pneumonia. These are once in a lifetime unless you and your provider decide differently. All people over 65 should have a yearly flu shot and a tetanus vaccine every 10 years. A bone mass density to screen for osteoporosis or thinning of the bones should be done every 2 years after 65. Screening for diabetes mellitus with a blood sugar test should be done every year. Glaucoma is a disease of the eye due to increased ocular pressure that can lead to blindness and it should be done every year by an eye professional. 
 
Cardiovascular screening tests that check for elevated lipids (fatty part of blood) which can lead to heart disease and strokes should be done every 5 years. Colorectal screening that evaluates for blood or polyps in your colon should be done yearly as a stool test or every five years as a flexible sigmoidoscope or every 10 years as a colonoscopy up to age 76. Breast cancer screening with a mammogram is recommended biennially  for women age 54-69. Screening for cervical cancer with a pap smear and pelvic exam is recommended for women after age 72 years every 2 years up to age 79 or when the provider and patient decide to stop. If there is a history of cervical abnormalities or other increased risk for cancer then the test is recommended yearly. Hepatitis C screening is also recommended for anyone born between [birth year] through [address]. A shingles vaccine is also recommended once in a lifetime after age 61. Your Medicare Wellness Exam is recommended annually. Here is a list of your current Health Maintenance items with a due date: Health Maintenance Due Topic Date Due Bill Jacobson Eye Exam  08/11/2015  Glaucoma Screening   08/11/2016 Introducing [facility]! Dear Veto Waters: Thank you for requesting a [employer] account. Our records indicate that you have previously registered for a [employer] account but its currently inactive. Please call our [employer] support line at [phone]. Additional Information If you have questions, please visit the Frequently Asked Questions section of the [employer] website at [email or web address] Remember, [employer] is NOT to be used for urgent needs. For medical emergencies, dial 911. Now available from your iPhone and Android! Please provide this summary of care documentation to your next provider. Your primary care clinician is listed as Holly Proctor. If you have any questions after today's visit, please call [phone].

## 2017-10-18 RX ORDER — SYRINGE-NEEDLE,INSULIN,0.5 ML 30 G X1/2"
SYRINGE, EMPTY DISPOSABLE MISCELLANEOUS
Qty: 100 SYRINGE | Refills: 3 | Status: SHIPPED | OUTPATIENT
Start: 2017-10-18 | End: 2019-02-13 | Stop reason: SDUPTHER

## 2017-10-18 NOTE — TELEPHONE ENCOUNTER
----- Message from Elicia Kim sent at 10/18/2017 10:43 AM EDT -----  Regarding: Dr Pablo/telephone  Patient is calling to check the status of her refill request for insulins needles, please call into [facility], been waiting since Monday,  pt can be reach at [phone].

## 2017-11-13 ENCOUNTER — HOSPITAL ENCOUNTER (OUTPATIENT)
Dept: LAB | Age: 82
Discharge: HOME | End: 2017-11-13
Payer: MEDICARE

## 2017-11-13 ENCOUNTER — OFFICE VISIT (OUTPATIENT)
Dept: FAMILY MEDICINE CLINIC | Age: 82
End: 2017-11-13

## 2017-11-13 VITALS
BODY MASS INDEX: 30.18 KG/M2 | DIASTOLIC BLOOD PRESSURE: 59 MMHG | TEMPERATURE: 98.7 F | WEIGHT: 187.8 LBS | HEART RATE: 61 BPM | SYSTOLIC BLOOD PRESSURE: 121 MMHG | RESPIRATION RATE: 14 BRPM | HEIGHT: 66 IN | OXYGEN SATURATION: 96 %

## 2017-11-13 DIAGNOSIS — I10 ESSENTIAL HYPERTENSION: ICD-10-CM

## 2017-11-13 DIAGNOSIS — L50.9 URTICARIA: ICD-10-CM

## 2017-11-13 DIAGNOSIS — E78.00 HYPERCHOLESTEROLEMIA: ICD-10-CM

## 2017-11-13 DIAGNOSIS — E11.9 TYPE 2 DIABETES MELLITUS WITHOUT COMPLICATION, WITH LONG-TERM CURRENT USE OF INSULIN (HCC): Primary | ICD-10-CM

## 2017-11-13 DIAGNOSIS — Z79.4 TYPE 2 DIABETES MELLITUS WITHOUT COMPLICATION, WITH LONG-TERM CURRENT USE OF INSULIN (HCC): Primary | ICD-10-CM

## 2017-11-13 LAB — HBA1C MFR BLD HPLC: 6.1 %

## 2017-11-13 PROCEDURE — 36415 COLL VENOUS BLD VENIPUNCTURE: CPT

## 2017-11-13 PROCEDURE — 80053 COMPREHEN METABOLIC PANEL: CPT

## 2017-11-13 PROCEDURE — 80061 LIPID PANEL: CPT

## 2017-11-13 RX ORDER — FEXOFENADINE HCL 60 MG
120 TABLET ORAL 2 TIMES DAILY
Qty: 120 TAB | Refills: 5 | Status: SHIPPED | OUTPATIENT
Start: 2017-11-13 | End: 2018-04-04 | Stop reason: SDUPTHER

## 2017-11-13 NOTE — PROGRESS NOTES
HISTORY OF PRESENT ILLNESS  Manuel Khan is a 80 y.o. female. HPI Pt. Comes in for blood pressure, cholesterol, and diabetes check. Has had a pruritic rash since July, taking allegra- mild relief. No new medicines, no new soaps, medicines. Doesn't think that it is related to anything that she is eating. Blood sugars have been around 100, no hypoglycemic episodes. ROS    Physical Exam   Constitutional: She is oriented to person, place, and time. She appears well-developed and well-nourished. Neck: No thyromegaly present. Cardiovascular: Normal rate, regular rhythm and normal heart sounds. No murmur heard. Pulmonary/Chest: Effort normal and breath sounds normal. She has no wheezes. Abdominal: Soft. Bowel sounds are normal. She exhibits no distension. There is no tenderness. There is no rebound and no guarding. Musculoskeletal: Normal range of motion. She exhibits no edema. Lymphadenopathy:     She has no cervical adenopathy. Neurological: She is alert and oriented to person, place, and time. Skin:   Several erythematous papular lesions on arms and a few on face. Nursing note and vitals reviewed. ASSESSMENT and PLAN  Orders Placed This Encounter    METABOLIC PANEL, COMPREHENSIVE    LIPID PANEL    REFERRAL TO ALLERGY    AMB POC HEMOGLOBIN A1C    HM DIABETES FOOT EXAM    fexofenadine (ALLEGRA) 60 mg tablet     Diagnoses and all orders for this visit:    1. Type 2 diabetes mellitus without complication, with long-term current use of insulin ([facility])  -     METABOLIC PANEL, COMPREHENSIVE  -     LIPID PANEL  -     AMB POC HEMOGLOBIN A1C  -      DIABETES FOOT EXAM    2. Essential hypertension    3. Hypercholesterolemia    4. Urticaria  -     REFERRAL TO ALLERGY    Other orders  -     fexofenadine (ALLEGRA) 60 mg tablet; Take 2 Tabs by mouth two (2) times a day. For hives      Follow-up Disposition:  Return in about 6 months (around 5/13/2018).

## 2017-11-13 NOTE — PROGRESS NOTES
Chief Complaint   Patient presents with    Complete Physical    Rash     rash  arms bilateral   Itchy  keeps pt \"up at night\"  \"since August\"     1. Have you been to the ER, urgent care clinic since your last visit? Hospitalized since your last visit? No    2. Have you seen or consulted any other health care providers outside of the [address] [place] [address] since your last visit? Include any pap smears or colon screening.  No\      Health Maintenance Due   Topic Date Due    EYE EXAM RETINAL OR DILATED Q1  08/11/2015    GLAUCOMA SCREENING Q2Y  08/11/2016    Pneumococcal 65+ High/Highest Risk (2 of 2 - PPSV23) 09/01/2016     Pt refused retinal scan

## 2017-11-13 NOTE — MR AVS SNAPSHOT
Visit Information Date & Time Provider Department Dept. Phone Encounter #  
 11/13/2017 11:00 AM Landon Tracy MD [facility] [phone] [id number] Upcoming Health Maintenance Date Due  
 EYE EXAM RETINAL OR DILATED Q1 8/11/2015 GLAUCOMA SCREENING Q2Y 8/11/2016 HEMOGLOBIN A1C Q6M 1/6/2018 FOOT EXAM Q1 7/6/2018 MICROALBUMIN Q1 7/6/2018 LIPID PANEL Q1 7/6/2018 MEDICARE YEARLY EXAM 8/30/2018 DTaP/Tdap/Td series (2 - Td) 8/29/2027 Allergies as of 11/13/2017  Review Complete On: 11/13/2017 By: Landon Tracy MD  
  
 Severity Noted Reaction Type Reactions Contrast Agent [Iodine]  05/01/2012    Other (comments) Affects kidneys Morphine  05/01/2012    Other (comments) Pt does not know the reaction Current Immunizations  Reviewed on 12/17/2015 Name Date Pneumococcal Vaccine (Unspecified Type) 9/1/2011 Not reviewed this visit You Were Diagnosed With   
  
 Codes Comments Type 2 diabetes mellitus without complication, with long-term current use of insulin (HCC)    -  Primary ICD-10-CM: E11.9, Z79.4 ICD-9-CM: 250.00, V58.67 Essential hypertension     ICD-10-CM: I10 
ICD-9-CM: 401.9 Hypercholesterolemia     ICD-10-CM: E78.00 ICD-9-CM: 272.0 Urticaria     ICD-10-CM: L50.9 ICD-9-CM: 708. 9 Vitals BP Pulse Temp Resp Height(growth percentile) Weight(growth percentile) 121/59 61 98.7 °F (37.1 °C) (Oral) 14 5' 6\" (1.676 m) 187 lb 12.8 oz (85.2 kg) SpO2 BMI OB Status Smoking Status 96% 30.31 kg/m2 Hysterectomy Never Smoker Vitals History BMI and BSA Data Body Mass Index Body Surface Area  
 30.31 kg/m 2 1.99 m 2 Preferred Pharmacy Pharmacy Name Phone [facility] [address] [phone] Your Updated Medication List  
  
   
This list is accurate as of: 11/13/17 12:25 PM.  Always use your most recent med list.  
  
 acetaminophen-codeine 300-30 mg per tablet Commonly known as:  TYLENOL #3  
TAKE 1 TO 2 TABLETS BY MOUTH EVERY 6 HOURS AS NEEDED FOR PAIN  
  
 allopurinol 100 mg tablet Commonly known as:  Almetta Hefty Take 1 Tab by mouth daily. To prevent gout  
  
 amLODIPine 10 mg tablet Commonly known as:  Brooklyn Royals Take 1 Tab by mouth daily. TAKE 1 TABLET BY MOUTH EVERY DAY  
  
 aspirin 81 mg tablet Take 81 mg by mouth. atorvastatin 20 mg tablet Commonly known as:  LIPITOR Take 1 Tab by mouth daily. For cholesterol  
  
 chlorthalidone 25 mg tablet Commonly known as:  Roberto Dace Take 1/2 tab daily for blood pressure  
  
 clotrimazole-betamethasone topical cream  
Commonly known as:  Kaleta Euler Apply to affected areas twice daily  
  
 docusate sodium 100 mg capsule Commonly known as:  COLACE  
TAKE 1 CAPSULE BY MOUTH TWICE DAILY  
  
 fexofenadine 60 mg tablet Commonly known as:  [place] Plenty Take 2 Tabs by mouth two (2) times a day. For hives  
  
 hydrocortisone 2.5 % topical cream  
Commonly known as:  HYTONE  
APPLY 3 TIMES DAILY FOR 14 DAYS AS NEEDED FOR RECTAL PAIN  
  
 insulin  unit/mL injection Commonly known as:  NovoLIN N Inject 24 units in am and 12 units in pm  
  
 Insulin Syringe-Needle U-100 1/2 mL 30 gauge x 1/2\" Syrg Commonly known as:  BD INSULIN SYRINGE ULTRA-FINE  
USE AS DIRECTED WITH INSULIN  
  
 losartan 100 mg tablet Commonly known as:  COZAAR  
TAKE 1 TABLET BY MOUTH EVERY DAY  
  
 polyethylene glycol 17 gram/dose powder Commonly known as:  Mary Flory TAKE 17GRAMS (1 CAPFUL) DAILY MIXED INTO 6-8 OUNCES OF ATER OR JUICE Prescriptions Sent to Pharmacy Refills  
 fexofenadine (ALLEGRA) 60 mg tablet 5 Sig: Take 2 Tabs by mouth two (2) times a day. For hives Class: Normal  
 Pharmacy: [address] [place] [address] Ph #: [phone] Route: Oral  
  
We Performed the Following AMB POC HEMOGLOBIN A1C [37093 CPT(R)]  DIABETES FOOT EXAM [HM7 Custom] LIPID PANEL [37445 CPT(R)] METABOLIC PANEL, COMPREHENSIVE [71982 CPT(R)] REFERRAL TO ALLERGY [REF5 Custom] Referral Information Referral ID Referred By Referred To  
  
 [id number] Tyshawn Gonzalez Not Available Visits Status Start Date End Date 1 New Request 11/13/17 11/13/18 If your referral has a status of pending review or denied, additional information will be sent to support the outcome of this decision. Introducing [facility]! Dear Tierra Samuels: Thank you for requesting a StyleSeat account. Our records indicate that you have previously registered for a StyleSeat account but its currently inactive. Please call our StyleSeat support line at [phone]. Additional Information If you have questions, please visit the Frequently Asked Questions section of the StyleSeat website at [email or web address] Remember, StyleSeat is NOT to be used for urgent needs. For medical emergencies, dial 911. Now available from your iPhone and Android! Please provide this summary of care documentation to your next provider. Your primary care clinician is listed as Stephan Zimmerman. If you have any questions after today's visit, please call [phone].

## 2017-11-14 LAB
ALBUMIN SERPL-MCNC: 4.2 G/DL (ref 3.5–4.7)
ALBUMIN/GLOB SERPL: 1.2 {RATIO} (ref 1.2–2.2)
ALP SERPL-CCNC: 107 IU/L (ref 39–117)
ALT SERPL-CCNC: 12 IU/L (ref 0–32)
AST SERPL-CCNC: 17 IU/L (ref 0–40)
BILIRUB SERPL-MCNC: 0.5 MG/DL (ref 0–1.2)
BUN SERPL-MCNC: 37 MG/DL (ref 8–27)
BUN/CREAT SERPL: 18 (ref 12–28)
CALCIUM SERPL-MCNC: 9.8 MG/DL (ref 8.7–10.3)
CHLORIDE SERPL-SCNC: 104 MMOL/L (ref 96–106)
CHOLEST SERPL-MCNC: 145 MG/DL (ref 100–199)
CO2 SERPL-SCNC: 26 MMOL/L (ref 18–29)
CREAT SERPL-MCNC: 2.08 MG/DL (ref 0.57–1)
GFR SERPLBLD CREATININE-BSD FMLA CKD-EPI: 21 ML/MIN/1.73
GFR SERPLBLD CREATININE-BSD FMLA CKD-EPI: 24 ML/MIN/1.73
GLOBULIN SER CALC-MCNC: 3.4 G/DL (ref 1.5–4.5)
GLUCOSE SERPL-MCNC: 136 MG/DL (ref 65–99)
HDLC SERPL-MCNC: 49 MG/DL
INTERPRETATION, 910389: NORMAL
INTERPRETATION: NORMAL
LDLC SERPL CALC-MCNC: 76 MG/DL (ref 0–99)
Lab: NORMAL
PDF IMAGE, 910387: NORMAL
POTASSIUM SERPL-SCNC: 4.6 MMOL/L (ref 3.5–5.2)
PROT SERPL-MCNC: 7.6 G/DL (ref 6–8.5)
SODIUM SERPL-SCNC: 144 MMOL/L (ref 134–144)
TRIGL SERPL-MCNC: 100 MG/DL (ref 0–149)
VLDLC SERPL CALC-MCNC: 20 MG/DL (ref 5–40)

## 2017-11-21 ENCOUNTER — TELEPHONE (OUTPATIENT)
Dept: FAMILY MEDICINE CLINIC | Age: 82
End: 2017-11-21

## 2017-11-21 DIAGNOSIS — N18.4 CRF (CHRONIC RENAL FAILURE), STAGE 4 (SEVERE) (HCC): Primary | ICD-10-CM

## 2017-12-13 ENCOUNTER — HOSPITAL ENCOUNTER (OUTPATIENT)
Dept: ULTRASOUND IMAGING | Age: 82
Discharge: HOME | End: 2017-12-13
Attending: INTERNAL MEDICINE
Payer: MEDICARE

## 2017-12-13 DIAGNOSIS — N17.9 ACUTE RENAL FAILURE SYNDROME (HCC): ICD-10-CM

## 2017-12-13 DIAGNOSIS — N18.30 CHRONIC KIDNEY DISEASE, STAGE III (MODERATE) (HCC): ICD-10-CM

## 2017-12-13 PROCEDURE — 76770 US EXAM ABDO BACK WALL COMP: CPT

## 2018-03-15 ENCOUNTER — OFFICE VISIT (OUTPATIENT)
Dept: FAMILY MEDICINE CLINIC | Age: 83
End: 2018-03-15

## 2018-03-15 VITALS
SYSTOLIC BLOOD PRESSURE: 119 MMHG | TEMPERATURE: 97.4 F | WEIGHT: 187 LBS | DIASTOLIC BLOOD PRESSURE: 50 MMHG | RESPIRATION RATE: 14 BRPM | OXYGEN SATURATION: 92 % | HEIGHT: 66 IN | HEART RATE: 60 BPM | BODY MASS INDEX: 30.05 KG/M2

## 2018-03-15 DIAGNOSIS — L03.011 PARONYCHIA OF FINGER, RIGHT: Primary | ICD-10-CM

## 2018-03-15 PROBLEM — E11.21 TYPE 2 DIABETES WITH NEPHROPATHY (HCC): Status: ACTIVE | Noted: 2018-03-15

## 2018-03-15 RX ORDER — AMOXICILLIN AND CLAVULANATE POTASSIUM 500; 125 MG/1; MG/1
TABLET, FILM COATED ORAL
COMMUNITY
End: 2018-05-24 | Stop reason: ALTCHOICE

## 2018-03-15 RX ORDER — LOSARTAN POTASSIUM 25 MG/1
TABLET ORAL
Refills: 6 | COMMUNITY
Start: 2018-02-23 | End: 2019-07-18 | Stop reason: SDUPTHER

## 2018-03-15 NOTE — MR AVS SNAPSHOT
[address] [facility] 
 
 
 [address] [facility] [place] [address] [address] 
[phone] Patient: Luiza Chirinos MRN: [id number] WSX:[birth year] Visit Information Date & Time Provider Department Dept. Phone Encounter #  
 3/15/2018 11:30 AM Dora Prasad MD [facility] [phone] [id number] Upcoming Health Maintenance Date Due  
 EYE EXAM RETINAL OR DILATED Q1 8/11/2015 GLAUCOMA SCREENING Q2Y 8/11/2016 HEMOGLOBIN A1C Q6M 5/13/2018 MEDICARE YEARLY EXAM 8/30/2018 FOOT EXAM Q1 11/13/2018 LIPID PANEL Q1 11/13/2018 MICROALBUMIN Q1 12/6/2018 DTaP/Tdap/Td series (2 - Td) 8/29/2027 Allergies as of 3/15/2018  Review Complete On: 3/15/2018 By: Sylvia Booth LPN Severity Noted Reaction Type Reactions Contrast Agent [Iodine]  05/01/2012    Other (comments) Affects kidneys Morphine  05/01/2012    Other (comments) Pt does not know the reaction Current Immunizations  Reviewed on 12/17/2015 Name Date Pneumococcal Vaccine (Unspecified Type) 9/1/2011 Not reviewed this visit Vitals BP Pulse Temp Resp Height(growth percentile) Weight(growth percentile) 119/50 60 97.4 °F (36.3 °C) (Oral) 14 5' 6\" (1.676 m) 187 lb (84.8 kg) SpO2 BMI OB Status Smoking Status 92% 30.18 kg/m2 Hysterectomy Never Smoker BMI and BSA Data Body Mass Index Body Surface Area  
 30.18 kg/m 2 1.99 m 2 Preferred Pharmacy Pharmacy Name Phone [facility] [address] [phone] Your Updated Medication List  
  
   
This list is accurate as of 3/15/18 11:51 AM.  Always use your most recent med list.  
  
  
  
  
 acetaminophen-codeine 300-30 mg per tablet Commonly known as:  TYLENOL #3  
TAKE 1 TO 2 TABLETS BY MOUTH EVERY 6 HOURS AS NEEDED FOR PAIN  
  
 allopurinol 100 mg tablet Commonly known as:  Gordy Gonzalez  
 Take 1 Tab by mouth daily. To prevent gout  
  
 amLODIPine 10 mg tablet Commonly known as:  Fouzia Weaver Take 1 Tab by mouth daily. TAKE 1 TABLET BY MOUTH EVERY DAY  
  
 aspirin 81 mg tablet Take 81 mg by mouth. atorvastatin 20 mg tablet Commonly known as:  LIPITOR Take 1 Tab by mouth daily. For cholesterol AUGMENTIN 500-125 mg per tablet Generic drug:  amoxicillin-clavulanate Take  by mouth. chlorthalidone 25 mg tablet Commonly known as:  Kolby Ink Take 1/2 tab daily for blood pressure  
  
 clotrimazole-betamethasone topical cream  
Commonly known as:  Margie Ml Apply to affected areas twice daily  
  
 docusate sodium 100 mg capsule Commonly known as:  COLACE  
TAKE 1 CAPSULE BY MOUTH TWICE DAILY  
  
 fexofenadine 60 mg tablet Commonly known as:  Ralf Akshat Take 2 Tabs by mouth two (2) times a day. For hives  
  
 hydrocortisone 2.5 % topical cream  
Commonly known as:  HYTONE  
APPLY 3 TIMES DAILY FOR 14 DAYS AS NEEDED FOR RECTAL PAIN  
  
 insulin  unit/mL injection Commonly known as:  NovoLIN N NPH U-100 Insulin Inject 24 units in am and 12 units in pm  
  
 Insulin Syringe-Needle U-100 1/2 mL 30 gauge x 1/2\" Syrg Commonly known as:  BD INSULIN SYRINGE ULTRA-FINE  
USE AS DIRECTED WITH INSULIN  
  
 losartan 25 mg tablet Commonly known as:  COZAAR  
TAKE 1 TABLET BY MOUTH TWICE A DAY  
  
 polyethylene glycol 17 gram/dose powder Commonly known as:  Odella Cadet TAKE 17GRAMS (1 CAPFUL) DAILY MIXED INTO 6-8 OUNCES OF ATER OR JUICE Introducing hospitals & HEALTH SERVICES! Raymundo Hewitt introduces [employer] patient portal. Now you can access parts of your medical record, email your doctor's office, and request medication refills online. 1. In your internet browser, go to [email or web address] 2. Click on the First Time User? Click Here link in the Sign In box. You will see the New Member Sign Up page. 3. Enter your cloud.IQ Access Code exactly as it appears below. You will not need to use this code after youve completed the sign-up process. If you do not sign up before the expiration date, you must request a new code. · cloud.IQ Access Code: [email or web address] Expires: 4/6/2018  9:50 AM 
 
4. Enter the last four digits of your Social Security Number (xxxx) and Date of Birth (mm/dd/yyyy) as indicated and click Submit. You will be taken to the next sign-up page. 5. Create a cloud.IQ ID. This will be your cloud.IQ login ID and cannot be changed, so think of one that is secure and easy to remember. 6. Create a cloud.IQ password. You can change your password at any time. 7. Enter your Password Reset Question and Answer. This can be used at a later time if you forget your password. 8. Enter your e-mail address. You will receive e-mail notification when new information is available in [address] 9. Click Sign Up. You can now view and download portions of your medical record. 10. Click the Download Summary menu link to download a portable copy of your medical information. If you have questions, please visit the Frequently Asked Questions section of the cloud.IQ website. Remember, cloud.IQ is NOT to be used for urgent needs. For medical emergencies, dial 911. Now available from your iPhone and Android! Please provide this summary of care documentation to your next provider. Your primary care clinician is listed as Cleophas Severin. If you have any questions after today's visit, please call [phone].

## 2018-03-15 NOTE — PROGRESS NOTES
Chief Complaint   Patient presents with    Hand Pain       cut finger 2 weeks ago  LEFT digitus medius distal phalanx  end of middle finger    Leg Pain     bilateral   \"veins hurt\"     ED Follow-up     Patient first  last saturday for left middle finger      1. Have you been to the ER, urgent care clinic since your last visit? Hospitalized since your last visit? See chief complaint     2. Have you seen or consulted any other health care providers outside of the [address] [place] [address] since your last visit? Include any pap smears or colon screening. No     Health Maintenance Due   Topic Date Due    EYE EXAM RETINAL OR DILATED Q1  08/11/2015    GLAUCOMA SCREENING Q2Y  08/11/2016     Pt refused retinal scan .   Pt given release form to take to next eye doctor appt in June

## 2018-04-04 RX ORDER — FEXOFENADINE HCL 60 MG
60 TABLET ORAL 2 TIMES DAILY
Qty: 180 TAB | Refills: 3 | Status: SHIPPED | OUTPATIENT
Start: 2018-04-04 | End: 2018-11-29 | Stop reason: ALTCHOICE

## 2018-05-24 ENCOUNTER — OFFICE VISIT (OUTPATIENT)
Dept: FAMILY MEDICINE CLINIC | Age: 83
End: 2018-05-24

## 2018-05-24 ENCOUNTER — HOSPITAL ENCOUNTER (OUTPATIENT)
Dept: LAB | Age: 83
Discharge: HOME | End: 2018-05-24
Payer: MEDICARE

## 2018-05-24 VITALS
HEIGHT: 66 IN | TEMPERATURE: 97.6 F | HEART RATE: 60 BPM | BODY MASS INDEX: 30.02 KG/M2 | SYSTOLIC BLOOD PRESSURE: 120 MMHG | WEIGHT: 186.8 LBS | OXYGEN SATURATION: 91 % | DIASTOLIC BLOOD PRESSURE: 68 MMHG | RESPIRATION RATE: 14 BRPM

## 2018-05-24 DIAGNOSIS — I73.9 PERIPHERAL VASCULAR DISEASE (HCC): Primary | ICD-10-CM

## 2018-05-24 DIAGNOSIS — E11.9 TYPE 2 DIABETES MELLITUS WITHOUT COMPLICATION, WITH LONG-TERM CURRENT USE OF INSULIN (HCC): ICD-10-CM

## 2018-05-24 DIAGNOSIS — I10 ESSENTIAL HYPERTENSION: ICD-10-CM

## 2018-05-24 DIAGNOSIS — Z79.4 TYPE 2 DIABETES MELLITUS WITHOUT COMPLICATION, WITH LONG-TERM CURRENT USE OF INSULIN (HCC): ICD-10-CM

## 2018-05-24 DIAGNOSIS — E78.00 HYPERCHOLESTEROLEMIA: ICD-10-CM

## 2018-05-24 LAB — HBA1C MFR BLD HPLC: 6 %

## 2018-05-24 PROCEDURE — 80061 LIPID PANEL: CPT

## 2018-05-24 PROCEDURE — 82043 UR ALBUMIN QUANTITATIVE: CPT

## 2018-05-24 PROCEDURE — 36415 COLL VENOUS BLD VENIPUNCTURE: CPT

## 2018-05-24 PROCEDURE — 80053 COMPREHEN METABOLIC PANEL: CPT

## 2018-05-24 PROCEDURE — 85025 COMPLETE CBC W/AUTO DIFF WBC: CPT

## 2018-05-24 RX ORDER — SENNOSIDES 25 MG/1
TABLET, FILM COATED ORAL
Qty: 15 G | Refills: 12 | Status: SHIPPED | OUTPATIENT
Start: 2018-05-24 | End: 2018-11-29 | Stop reason: ALTCHOICE

## 2018-05-24 RX ORDER — ERGOCALCIFEROL 1.25 MG/1
CAPSULE ORAL
Refills: 0 | COMMUNITY
Start: 2018-05-10 | End: 2018-11-29 | Stop reason: ALTCHOICE

## 2018-05-24 NOTE — PROGRESS NOTES
HISTORY OF PRESENT ILLNESS  Tami Thomas is a 80 y.o. female. HPI Pt. Comes in for blood pressure, cholesterol, and diabetes check. Has been feeling fairly well. Legs get tired easily if does much walking. This has been present for several months. Tiredness is mainly in calves. Symptoms improve if sits down. Also has some lower back pain. No complaints of chest pain, shortness of breath, TIAs, claudication or edema. Has appt with Dr. Joshua Rudolph next week. Not checking blood sugars. No hypoglycemic episodes. Keeping weight down. ROS    Physical Exam   Constitutional: She appears well-developed and well-nourished. HENT:   Right Ear: External ear normal.   Left Ear: External ear normal.   Mouth/Throat: Oropharynx is clear and moist.   Neck: No thyromegaly present. Cardiovascular: Normal rate, regular rhythm and normal heart sounds. Absent pulses bilaterally   Pulmonary/Chest: Breath sounds normal. She has no wheezes. Abdominal: Soft. Bowel sounds are normal. She exhibits no distension and no mass. There is no tenderness. Musculoskeletal: She exhibits no edema. Lymphadenopathy:     She has no cervical adenopathy. Nursing note and vitals reviewed. ASSESSMENT and PLAN  Orders Placed This Encounter    METABOLIC PANEL, COMPREHENSIVE    LIPID PANEL    CBC WITH AUTOMATED DIFF    MICROALBUMIN, UR, RAND W/ MICROALB/CREAT RATIO    AMB POC HEMOGLOBIN A1C    lidocaine 5 % topical cream     Sig: Apply  to affected area two (2) times daily as needed for Itching. Apply to affected keloid twice daily     Dispense:  15 g     Refill:  12     Orders Placed This Encounter    METABOLIC PANEL, COMPREHENSIVE    LIPID PANEL    CBC WITH AUTOMATED DIFF    MICROALBUMIN, UR, RAND W/ MICROALB/CREAT RATIO    AMB POC HEMOGLOBIN A1C    lidocaine 5 % topical cream     Diagnoses and all orders for this visit:    1. Peripheral vascular disease ([facility] [address]    2.  Type 2 diabetes mellitus without complication, with long-term current use of insulin (HCC)  -     AMB POC HEMOGLOBIN A1C  -     MICROALBUMIN, UR, RAND W/ MICROALB/CREAT RATIO    3. Essential hypertension  -     METABOLIC PANEL, COMPREHENSIVE  -     CBC WITH AUTOMATED DIFF    4. Hypercholesterolemia  -     LIPID PANEL    Other orders  -     lidocaine 5 % topical cream; Apply  to affected area two (2) times daily as needed for Itching. Apply to affected keloid twice daily      Follow-up Disposition:  Return in about 6 months (around 11/24/2018).

## 2018-05-24 NOTE — MR AVS SNAPSHOT
[address] [facility] 
 
 
 [address] [facility] [id number] [phone] Patient: Caryle Fast MRN: [id number] TOP:[birth year] Visit Information Date & Time Provider Department Dept. Phone Encounter #  
 5/24/2018 10:15 AM Bernard Nobles MD [facility] [phone] [id number] Follow-up Instructions Return in about 6 months (around 11/24/2018). Upcoming Health Maintenance Date Due  
 EYE EXAM RETINAL OR DILATED Q1 8/11/2015 GLAUCOMA SCREENING Q2Y 8/11/2016 HEMOGLOBIN A1C Q6M 5/13/2018 Influenza Age 5 to Adult 8/1/2018 MEDICARE YEARLY EXAM 8/30/2018 FOOT EXAM Q1 11/13/2018 LIPID PANEL Q1 11/13/2018 MICROALBUMIN Q1 12/6/2018 DTaP/Tdap/Td series (2 - Td) 8/29/2027 Allergies as of 5/24/2018  Review Complete On: 5/24/2018 By: Bernard Nobles MD  
  
 Severity Noted Reaction Type Reactions Contrast Agent [Iodine]  05/01/2012    Other (comments) Affects kidneys Morphine  05/01/2012    Other (comments) Pt does not know the reaction Current Immunizations  Reviewed on 12/17/2015 Name Date Pneumococcal Vaccine (Unspecified Type) 9/1/2011 Not reviewed this visit You Were Diagnosed With   
  
 Codes Comments [facility]    -  Primary ICD-10-CM: I73.9 ICD-9-CM: 443. 9 Type 2 diabetes mellitus without complication, with long-term current use of insulin (HCC)     ICD-10-CM: E11.9, Z79.4 ICD-9-CM: 250.00, V58.67 Essential hypertension     ICD-10-CM: I10 
ICD-9-CM: 401.9 Hypercholesterolemia     ICD-10-CM: E78.00 ICD-9-CM: 272.0 Vitals BP Pulse Temp Resp Height(growth percentile) Weight(growth percentile) 120/68 60 97.6 °F (36.4 °C) (Oral) 14 5' 6\" (1.676 m) 186 lb 12.8 oz (84.7 kg) SpO2 BMI OB Status Smoking Status 91% 30.15 kg/m2 Hysterectomy Never Smoker Vitals History BMI and BSA Data Body Mass Index Body Surface Area  
 30.15 kg/m 2 1.99 m 2 Preferred Pharmacy [facility]acy [facility] Phone [facility] [address] [phone] Your Updated Medication List  
  
   
This list is accurate as of 5/24/18 11:09 AM.  Always use your most recent med list.  
  
  
  
  
 acetaminophen-codeine 300-30 mg per tablet Commonly known as:  TYLENOL #3  
TAKE 1 TO 2 TABLETS BY MOUTH EVERY 6 HOURS AS NEEDED FOR PAIN  
  
 allopurinol 100 mg tablet Commonly known as:  Ariana Chidi Take 1 Tab by mouth daily. To prevent gout  
  
 amLODIPine 10 mg tablet Commonly known as:  [place] Bria Take 1 Tab by mouth daily. TAKE 1 TABLET BY MOUTH EVERY DAY  
  
 aspirin 81 mg tablet Take 81 mg by mouth. atorvastatin 20 mg tablet Commonly known as:  LIPITOR Take 1 Tab by mouth daily. For cholesterol  
  
 chlorthalidone 25 mg tablet Commonly known as:  Jenene Legacy Take 1/2 tab daily for blood pressure  
  
 clotrimazole-betamethasone topical cream  
Commonly known as:  Sofia [place] Apply to affected areas twice daily  
  
 docusate sodium 100 mg capsule Commonly known as:  COLACE  
TAKE 1 CAPSULE BY MOUTH TWICE DAILY  
  
 ergocalciferol 50,000 unit capsule Commonly known as:  ERGOCALCIFEROL  
TAKE 1 CAPSULE (50,000 UNIT) BY MOUTH ONCE WEEKLY FOR 90 DAYS  
  
 fexofenadine 60 mg tablet Commonly known as:  Maple Moon Take 1 Tab by mouth two (2) times a day. For hives  
  
 hydrocortisone 2.5 % topical cream  
Commonly known as:  HYTONE  
APPLY 3 TIMES DAILY FOR 14 DAYS AS NEEDED FOR RECTAL PAIN  
  
 insulin  unit/mL injection Commonly known as:  NovoLIN N NPH U-100 Insulin Inject 24 units in am and 12 units in pm  
  
 Insulin Syringe-Needle U-100 1/2 mL 30 gauge x 1/2\" Syrg Commonly known as:  BD INSULIN SYRINGE ULTRA-FINE  
USE AS DIRECTED WITH INSULIN  
  
 lidocaine 5 % topical cream  
 Apply  to affected area two (2) times daily as needed for Itching. Apply to affected keloid twice daily  
  
 losartan 25 mg tablet Commonly known as:  COZAAR  
TAKE 1 TABLET BY MOUTH TWICE A DAY  
  
 polyethylene glycol 17 gram/dose powder Commonly known as:  Maurene Lamp TAKE 17GRAMS (1 CAPFUL) DAILY MIXED INTO 6-8 OUNCES OF ATER OR JUICE Prescriptions Sent to Pharmacy Refills  
 lidocaine 5 % topical cream 12 Sig: Apply  to affected area two (2) times daily as needed for Itching. Apply to affected keloid twice daily Class: Normal  
 Pharmacy: [address] [place] [address] #: [phone] Route: Topical  
  
We Performed the Following AMB POC HEMOGLOBIN A1C [82841 CPT(R)] CBC WITH AUTOMATED DIFF [85711 CPT(R)] LIPID PANEL [72274 CPT(R)] METABOLIC PANEL, COMPREHENSIVE [71225 CPT(R)] MICROALBUMIN, UR, RAND W/ MICROALB/CREAT RATIO [id number] CPT(R)] Follow-up Instructions Return in about 6 months (around 11/24/2018). Introducing [facility]! Leni Macario introduces [employer] patient portal. Now you can access parts of your medical record, email your doctor's office, and request medication refills online. 1. In your internet browser, go to [email or web address] 2. Click on the First Time User? Click Here link in the Sign In box. You will see the New Member Sign Up page. 3. Enter your [employer] Concept[employer] Access Code exactly as it appears below. You will not need to use this code after youve completed the sign-up process. If you do not sign up before the expiration date, you must request a new code. · [employer] Access Code: [email or web address] Expires: 8/22/2018 11:09 AM 
 
4. Enter the last four digits of your Social Security Number (xxxx) and Date of Birth (mm/dd/yyyy) as indicated and click Submit. You will be taken to the next sign-up page. 5. Create a Sumavision ID. This will be your Sumavision login ID and cannot be changed, so think of one that is secure and easy to remember. 6. Create a Sumavision password. You can change your password at any time. 7. Enter your Password Reset Question and Answer. This can be used at a later time if you forget your password. 8. Enter your e-mail address. You will receive e-mail notification when new information is available in [address] 9. Click Sign Up. You can now view and download portions of your medical record. 10. Click the Download Summary menu link to download a portable copy of your medical information. If you have questions, please visit the Frequently Asked Questions section of the Sumavision website. Remember, Sumavision is NOT to be used for urgent needs. For medical emergencies, dial 911. Now available from your iPhone and Android! Please provide this summary of care documentation to your next provider. Your primary care clinician is listed as Tate Givens. If you have any questions after today's visit, please call [phone].

## 2018-05-24 NOTE — PROGRESS NOTES
Chief Complaint   Patient presents with    Hypertension    Diabetes    Cholesterol Problem    Varicose Veins     in feet    Leg Pain     bilateral      1. Have you been to the ER, urgent care clinic since your last visit? Hospitalized since your last visit? [facility]  Months ago for Neck pain     2. Have you seen or consulted any other health care providers outside of the [address] [place] [address] since your last visit? Include any pap smears or colon screening. No      Health Maintenance Due   Topic Date Due    EYE EXAM RETINAL OR DILATED Q1  08/11/2015    GLAUCOMA SCREENING Q2Y  08/11/2016    HEMOGLOBIN A1C Q6M  05/13/2018     Pt refused retinal scan. Pt given release form to sign.   Just saw eye doctor last week

## 2018-05-25 LAB
ALBUMIN SERPL-MCNC: 4.3 G/DL (ref 3.5–4.7)
ALBUMIN/CREAT UR: 3.9 MG/G CREAT (ref 0–30)
ALBUMIN/GLOB SERPL: 1.3 {RATIO} (ref 1.2–2.2)
ALP SERPL-CCNC: 113 IU/L (ref 39–117)
ALT SERPL-CCNC: 13 IU/L (ref 0–32)
AST SERPL-CCNC: 18 IU/L (ref 0–40)
BASOPHILS # BLD AUTO: 0 X10E3/UL (ref 0–0.2)
BASOPHILS NFR BLD AUTO: 1 %
BILIRUB SERPL-MCNC: 0.5 MG/DL (ref 0–1.2)
BUN SERPL-MCNC: 32 MG/DL (ref 8–27)
BUN/CREAT SERPL: 19 (ref 12–28)
CALCIUM SERPL-MCNC: 9.6 MG/DL (ref 8.7–10.3)
CHLORIDE SERPL-SCNC: 105 MMOL/L (ref 96–106)
CHOLEST SERPL-MCNC: 134 MG/DL (ref 100–199)
CO2 SERPL-SCNC: 23 MMOL/L (ref 18–29)
CREAT SERPL-MCNC: 1.69 MG/DL (ref 0.57–1)
CREAT UR-MCNC: 266.2 MG/DL
EOSINOPHIL # BLD AUTO: 0.2 X10E3/UL (ref 0–0.4)
EOSINOPHIL NFR BLD AUTO: 2 %
ERYTHROCYTE [DISTWIDTH] IN BLOOD BY AUTOMATED COUNT: 16.8 % (ref 12.3–15.4)
GFR SERPLBLD CREATININE-BSD FMLA CKD-EPI: 27 ML/MIN/1.73
GFR SERPLBLD CREATININE-BSD FMLA CKD-EPI: 31 ML/MIN/1.73
GLOBULIN SER CALC-MCNC: 3.3 G/DL (ref 1.5–4.5)
GLUCOSE SERPL-MCNC: 135 MG/DL (ref 65–99)
HCT VFR BLD AUTO: 39.3 % (ref 34–46.6)
HDLC SERPL-MCNC: 48 MG/DL
HGB BLD-MCNC: 12.2 G/DL (ref 11.1–15.9)
IMM GRANULOCYTES # BLD: 0 X10E3/UL (ref 0–0.1)
IMM GRANULOCYTES NFR BLD: 0 %
INTERPRETATION, 910389: NORMAL
INTERPRETATION: NORMAL
LDLC SERPL CALC-MCNC: 63 MG/DL (ref 0–99)
LYMPHOCYTES # BLD AUTO: 3.1 X10E3/UL (ref 0.7–3.1)
LYMPHOCYTES NFR BLD AUTO: 47 %
Lab: NORMAL
MCH RBC QN AUTO: 27.6 PG (ref 26.6–33)
MCHC RBC AUTO-ENTMCNC: 31 G/DL (ref 31.5–35.7)
MCV RBC AUTO: 89 FL (ref 79–97)
MICROALBUMIN UR-MCNC: 10.5 UG/ML
MONOCYTES # BLD AUTO: 0.4 X10E3/UL (ref 0.1–0.9)
MONOCYTES NFR BLD AUTO: 7 %
NEUTROPHILS # BLD AUTO: 2.8 X10E3/UL (ref 1.4–7)
NEUTROPHILS NFR BLD AUTO: 43 %
PDF IMAGE, 910387: NORMAL
PLATELET # BLD AUTO: 301 X10E3/UL (ref 150–379)
POTASSIUM SERPL-SCNC: 4.6 MMOL/L (ref 3.5–5.2)
PROT SERPL-MCNC: 7.6 G/DL (ref 6–8.5)
RBC # BLD AUTO: 4.42 X10E6/UL (ref 3.77–5.28)
SODIUM SERPL-SCNC: 144 MMOL/L (ref 134–144)
TRIGL SERPL-MCNC: 113 MG/DL (ref 0–149)
VLDLC SERPL CALC-MCNC: 23 MG/DL (ref 5–40)
WBC # BLD AUTO: 6.5 X10E3/UL (ref 3.4–10.8)

## 2018-06-12 RX ORDER — AMLODIPINE BESYLATE 10 MG/1
10 TABLET ORAL DAILY
Qty: 90 TAB | Refills: 3 | Status: SHIPPED | OUTPATIENT
Start: 2018-06-12 | End: 2018-11-29

## 2018-06-12 NOTE — TELEPHONE ENCOUNTER
Last Visit: 5/24/18-Samy  Next Appt: 11/29/18-Samy  Previous Refill Encounter: 6/13/17-90+3 refills    Requested Prescriptions     Pending Prescriptions Disp Refills    amLODIPine (NORVASC) 10 mg tablet 90 Tab 3     Sig: Take 1 Tab by mouth daily.  TAKE 1 TABLET BY MOUTH EVERY DAY

## 2018-07-27 ENCOUNTER — HOSPITAL ENCOUNTER (OUTPATIENT)
Dept: LAB | Age: 83
Discharge: HOME | End: 2018-07-27
Payer: MEDICARE

## 2018-07-27 ENCOUNTER — OFFICE VISIT (OUTPATIENT)
Dept: FAMILY MEDICINE CLINIC | Age: 83
End: 2018-07-27

## 2018-07-27 VITALS
HEIGHT: 66 IN | RESPIRATION RATE: 14 BRPM | TEMPERATURE: 97.4 F | SYSTOLIC BLOOD PRESSURE: 136 MMHG | WEIGHT: 188.6 LBS | OXYGEN SATURATION: 91 % | DIASTOLIC BLOOD PRESSURE: 60 MMHG | HEART RATE: 56 BPM | BODY MASS INDEX: 30.31 KG/M2

## 2018-07-27 DIAGNOSIS — M10.372 ACUTE GOUT DUE TO RENAL IMPAIRMENT INVOLVING LEFT ANKLE: Primary | ICD-10-CM

## 2018-07-27 DIAGNOSIS — M17.0 PRIMARY OSTEOARTHRITIS OF BOTH KNEES: ICD-10-CM

## 2018-07-27 PROCEDURE — 84550 ASSAY OF BLOOD/URIC ACID: CPT

## 2018-07-27 PROCEDURE — 36415 COLL VENOUS BLD VENIPUNCTURE: CPT

## 2018-07-27 RX ORDER — ACETAMINOPHEN AND CODEINE PHOSPHATE 300; 30 MG/1; MG/1
TABLET ORAL
Qty: 90 TAB | Refills: 3 | Status: SHIPPED | OUTPATIENT
Start: 2018-07-27 | End: 2019-05-30 | Stop reason: SDUPTHER

## 2018-07-27 RX ORDER — ALLOPURINOL 100 MG/1
200 TABLET ORAL DAILY
Qty: 180 TAB | Refills: 3 | Status: SHIPPED | OUTPATIENT
Start: 2018-07-27 | End: 2019-07-18 | Stop reason: SDUPTHER

## 2018-07-27 RX ORDER — PREDNISONE 20 MG/1
20 TABLET ORAL 2 TIMES DAILY
Qty: 10 TAB | Refills: 0 | Status: SHIPPED | OUTPATIENT
Start: 2018-07-27 | End: 2018-11-29 | Stop reason: ALTCHOICE

## 2018-07-27 NOTE — MR AVS SNAPSHOT
[facility] 
 
 
 [address] [facility] [address] [address] 
[phone] Patient: Delmar Gould MRN: [id number] Visit Information Date & Time Provider Department Dept. Phone Encounter #  
 7/27/2018  9:45 AM Doe Jain MD [facility] [phone] [id number] Your Appointments 11/29/2018 11:00 AM  
ROUTINE CARE with Doe Jain MD  
[facility] [address] [facility]) Appt Note: routine follow up  
 [address] [place] [address] [facility] [id number] [phone] [address] [facility] [address] Upcoming Health Maintenance Date Due  
 EYE EXAM RETINAL OR DILATED Q1 8/11/2015 GLAUCOMA SCREENING Q2Y 8/11/2016 Influenza Age 5 to Adult 8/1/2018 MEDICARE YEARLY EXAM 8/30/2018 FOOT EXAM Q1 11/13/2018 HEMOGLOBIN A1C Q6M 11/24/2018 MICROALBUMIN Q1 5/24/2019 LIPID PANEL Q1 5/24/2019 DTaP/Tdap/Td series (2 - Td) 8/29/2027 Allergies as of 7/27/2018  Review Complete On: 7/27/2018 By: Doe Jain MD  
  
 Severity Noted Reaction Type Reactions Contrast Agent [Iodine]  05/01/2012    Other (comments) Affects kidneys Morphine  05/01/2012    Other (comments) Pt does not know the reaction Current Immunizations  Reviewed on 12/17/2015 Name Date Pneumococcal Vaccine (Unspecified Type) 9/1/2011 Not reviewed this visit You Were Diagnosed With   
  
 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  
 30.44 kg/m 2 2 m 2 Preferred Pharmacy Pharmacy Name Phone [facility] [address] [phone] Your Updated Medication List  
  
   
This list is accurate as of 7/27/18 11:06 AM.  Always use your most recent med list.  
  
  
  
  
 acetaminophen-codeine 300-30 mg per tablet Commonly known as:  TYLENOL #3  
TAKE 1 TO 2 TABLETS BY MOUTH EVERY 6 HOURS AS NEEDED FOR PAIN  
  
 allopurinol 100 mg tablet Commonly known as:  Clementeen Velasco Take 2 Tabs by mouth daily. To prevent gout  
  
 amLODIPine 10 mg tablet Commonly known as:  Qian Fraction Take 1 Tab by mouth daily. TAKE 1 TABLET BY MOUTH EVERY DAY  
  
 aspirin 81 mg tablet Take 81 mg by mouth. atorvastatin 20 mg tablet Commonly known as:  LIPITOR Take 1 Tab by mouth daily. For cholesterol  
  
 chlorthalidone 25 mg tablet Commonly known as:  Suzen Pay Take 1/2 tab daily for blood pressure  
  
 clotrimazole-betamethasone topical cream  
Commonly known as:  Hope Lipa Apply to affected areas twice daily  
  
 docusate sodium 100 mg capsule Commonly known as:  COLACE  
TAKE 1 CAPSULE BY MOUTH TWICE DAILY  
  
 ergocalciferol 50,000 unit capsule Commonly known as:  ERGOCALCIFEROL  
TAKE 1 CAPSULE (50,000 UNIT) BY MOUTH ONCE WEEKLY FOR 90 DAYS  
  
 fexofenadine 60 mg tablet Commonly known as:  Saige Record Take 1 Tab by mouth two (2) times a day. For hives  
  
 hydrocortisone 2.5 % topical cream  
Commonly known as:  HYTONE  
APPLY 3 TIMES DAILY FOR 14 DAYS AS NEEDED FOR RECTAL PAIN  
  
 insulin  unit/mL injection Commonly known as:  NovoLIN N NPH U-100 Insulin Inject 24 units in am and 12 units in pm  
  
 Insulin Syringe-Needle U-100 1/2 mL 30 gauge x 1/2\" Syrg Commonly known as:  BD INSULIN SYRINGE ULTRA-FINE  
USE AS DIRECTED WITH INSULIN  
  
 lidocaine 5 % topical cream  
 Apply  to affected area two (2) times daily as needed for Itching. Apply to affected keloid twice daily  
  
 losartan 25 mg tablet Commonly known as:  COZAAR  
TAKE 1 TABLET BY MOUTH TWICE A DAY  
  
 polyethylene glycol 17 gram/dose powder Commonly known as:  Leata Sans TAKE 17GRAMS (1 CAPFUL) DAILY MIXED INTO 6-8 OUNCES OF ATER OR JUICE  
  
 predniSONE 20 mg tablet Commonly known as:  Kylee Esters Take 1 Tab by mouth two (2) times a day. Prescriptions Printed Refills  
 acetaminophen-codeine (TYLENOL #3) 300-30 mg per tablet 3 Sig: TAKE 1 TO 2 TABLETS BY MOUTH EVERY 6 HOURS AS NEEDED FOR PAIN Class: Print Prescriptions Sent to Pharmacy Refills  
 allopurinol (ZYLOPRIM) 100 mg tablet 3 Sig: Take 2 Tabs by mouth daily. To prevent gout Class: Normal  
 Pharmacy: [address] [place] [address] Ph #: [phone] Route: Oral  
 predniSONE (DELTASONE) 20 mg tablet 0 Sig: Take 1 Tab by mouth two (2) times a day. Class: Normal  
 Pharmacy: [address] [place] [address] Ph #: [phone] Route: Oral  
  
We Performed the Following URIC ACID [id number] CPT(R)] Introducing [facility]! [facility] introduces Voxy patient portal. Now you can access parts of your medical record, email your doctor's office, and request medication refills online. 1. In your internet browser, go to [email or web address] 2. Click on the First Time User? Click Here link in the Sign In box. You will see the New Member Sign Up page. 3. Enter your Voxy Access Code exactly as it appears below. You will not need to use this code after youve completed the sign-up process. If you do not sign up before the expiration date, you must request a new code. · Voxy Access Code: [email or web address] Expires: 8/22/2018 11:09 AM 
 
 4. Enter the last four digits of your Social Security Number (xxxx) and Date of Birth (mm/dd/yyyy) as indicated and click Submit. You will be taken to the next sign-up page. 5. Create a Fixstars ID. This will be your Fixstars login ID and cannot be changed, so think of one that is secure and easy to remember. 6. Create a Fixstars password. You can change your password at any time. 7. Enter your Password Reset Question and Answer. This can be used at a later time if you forget your password. 8. Enter your e-mail address. You will receive e-mail notification when new information is available in [address] 9. Click Sign Up. You can now view and download portions of your medical record. 10. Click the Download Summary menu link to download a portable copy of your medical information. If you have questions, please visit the Frequently Asked Questions section of the Fixstars website. Remember, Fixstars is NOT to be used for urgent needs. For medical emergencies, dial 911. Now available from your iPhone and Android! Please provide this summary of care documentation to your next provider. Your primary care clinician is listed as Eric Ott. If you have any questions after today's visit, please call [phone].

## 2018-07-27 NOTE — PROGRESS NOTES
Chief Complaint Patient presents with  Leg Swelling  
  x 5 days  Left leg  Ankle swelling  
  left  Foot Swelling  
  left 1. Have you been to the ER, urgent care clinic since your last visit? Hospitalized since your last visit? No 
 
2. Have you seen or consulted any other health care providers outside of the [address] [place] [address] since your last visit? Include any pap smears or colon screening. No  
 
Health Maintenance Due Topic Date Due  
 EYE EXAM RETINAL OR DILATED Q1  08/11/2015  GLAUCOMA SCREENING Q2Y  08/11/2016 Pt  Refused retinal scan. Pt already signed   Release form.

## 2018-07-27 NOTE — PROGRESS NOTES
HISTORY OF PRESENT ILLNESS Delmar Gould is a 80 y.o. female. HPI has been having swelling in feet since Sunday. Has been having some pain in L leg since Monday. Has been having pain in l ankle, radiates up L leg. No dyspnea, chest pains. Saw Dr. Wing Cortez in June, was told that circulation was stable. Not taking allopurinol regularly. ROS Physical Exam  
Constitutional: She appears well-developed and well-nourished. HENT:  
Right Ear: External ear normal.  
Left Ear: External ear normal.  
Mouth/Throat: Oropharynx is clear and moist.  
Neck: No thyromegaly present. Cardiovascular: Normal rate, regular rhythm, normal heart sounds and intact distal pulses. Pulmonary/Chest: Breath sounds normal. She has no wheezes. Abdominal: Soft. Bowel sounds are normal. She exhibits no distension and no mass. There is no tenderness. Musculoskeletal: She exhibits no edema. L ankle- swollen, tender. Lymphadenopathy:  
  She has no cervical adenopathy. Nursing note and vitals reviewed. ASSESSMENT and PLAN Orders Placed This Encounter  URIC ACID  
 acetaminophen-codeine (TYLENOL #3) 300-30 mg per tablet  allopurinol (ZYLOPRIM) 100 mg tablet  predniSONE (DELTASONE) 20 mg tablet Diagnoses and all orders for this visit: 
 
1. 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 
-     URIC ACID 2. Primary osteoarthritis of both knees 
-     acetaminophen-codeine (TYLENOL #3) 300-30 mg per tablet; TAKE 1 TO 2 TABLETS BY MOUTH EVERY 6 HOURS AS NEEDED FOR PAIN Other orders 
-     allopurinol (ZYLOPRIM) 100 mg tablet; Take 2 Tabs by mouth daily. To prevent gout -     predniSONE (DELTASONE) 20 mg tablet; Take 1 Tab by mouth two (2) times a day. Follow-up Disposition: 
Return in about 3 months (around 10/27/2018).

## 2018-07-28 LAB — URATE SERPL-MCNC: 8.2 MG/DL (ref 2.5–7.1)

## 2018-08-06 RX ORDER — ATORVASTATIN CALCIUM 20 MG/1
20 TABLET, FILM COATED ORAL DAILY
Qty: 90 TAB | Refills: 3 | Status: SHIPPED | OUTPATIENT
Start: 2018-08-06 | End: 2018-11-29 | Stop reason: SDUPTHER

## 2018-08-06 NOTE — TELEPHONE ENCOUNTER
Last Visit: 7/27-Samy  Next Appt: 11/29-Samy  Previous Refill Encounter: 7/6/17-90+3    Requested Prescriptions     Pending Prescriptions Disp Refills    atorvastatin (LIPITOR) 20 mg tablet 90 Tab 3     Sig: Take 1 Tab by mouth daily.  For cholesterol

## 2018-09-07 DIAGNOSIS — I10 ESSENTIAL HYPERTENSION: ICD-10-CM

## 2018-09-07 RX ORDER — CHLORTHALIDONE 25 MG/1
TABLET ORAL
Qty: 90 TAB | Refills: 3 | Status: SHIPPED | OUTPATIENT
Start: 2018-09-07 | End: 2018-11-29

## 2018-09-07 NOTE — TELEPHONE ENCOUNTER
Last Visit: 7/27-Samy  Next Appt: 11/29-Samy  Previous Refill Encounter: 7/6/17-90+3    Requested Prescriptions     Pending Prescriptions Disp Refills    chlorthalidone (HYGROTEN) 25 mg tablet 90 Tab 3     Sig: Take 1/2 tab daily for blood pressure

## 2018-11-29 ENCOUNTER — OFFICE VISIT (OUTPATIENT)
Dept: FAMILY MEDICINE CLINIC | Age: 83
End: 2018-11-29

## 2018-11-29 VITALS
WEIGHT: 186.4 LBS | HEIGHT: 66 IN | RESPIRATION RATE: 16 BRPM | TEMPERATURE: 96.7 F | DIASTOLIC BLOOD PRESSURE: 52 MMHG | OXYGEN SATURATION: 95 % | BODY MASS INDEX: 29.96 KG/M2 | HEART RATE: 64 BPM | SYSTOLIC BLOOD PRESSURE: 114 MMHG

## 2018-11-29 DIAGNOSIS — Z79.4 CONTROLLED TYPE 2 DIABETES MELLITUS WITH STAGE 3 CHRONIC KIDNEY DISEASE, WITH LONG-TERM CURRENT USE OF INSULIN (HCC): Primary | ICD-10-CM

## 2018-11-29 DIAGNOSIS — E78.00 HYPERCHOLESTEROLEMIA: ICD-10-CM

## 2018-11-29 DIAGNOSIS — E11.22 CONTROLLED TYPE 2 DIABETES MELLITUS WITH STAGE 3 CHRONIC KIDNEY DISEASE, WITH LONG-TERM CURRENT USE OF INSULIN (HCC): Primary | ICD-10-CM

## 2018-11-29 DIAGNOSIS — I10 ESSENTIAL HYPERTENSION: ICD-10-CM

## 2018-11-29 DIAGNOSIS — M10.9 GOUT, UNSPECIFIED CAUSE, UNSPECIFIED CHRONICITY, UNSPECIFIED SITE: ICD-10-CM

## 2018-11-29 DIAGNOSIS — M25.551 RIGHT HIP PAIN: ICD-10-CM

## 2018-11-29 DIAGNOSIS — N18.30 CONTROLLED TYPE 2 DIABETES MELLITUS WITH STAGE 3 CHRONIC KIDNEY DISEASE, WITH LONG-TERM CURRENT USE OF INSULIN (HCC): Primary | ICD-10-CM

## 2018-11-29 LAB — HBA1C MFR BLD HPLC: 6.2 %

## 2018-11-29 RX ORDER — CHLORTHALIDONE 25 MG/1
25 TABLET ORAL DAILY
Qty: 90 TAB | Refills: 3 | Status: SHIPPED | OUTPATIENT
Start: 2018-11-29 | End: 2019-07-18 | Stop reason: SDUPTHER

## 2018-11-29 RX ORDER — AMLODIPINE BESYLATE 5 MG/1
5 TABLET ORAL DAILY
Qty: 90 TAB | Refills: 3 | Status: SHIPPED | OUTPATIENT
Start: 2018-11-29 | End: 2019-07-18 | Stop reason: SDUPTHER

## 2018-11-29 RX ORDER — ATORVASTATIN CALCIUM 20 MG/1
20 TABLET, FILM COATED ORAL DAILY
Qty: 90 TAB | Refills: 3 | Status: SHIPPED | OUTPATIENT
Start: 2018-11-29 | End: 2019-07-18 | Stop reason: SDUPTHER

## 2018-11-29 NOTE — PROGRESS NOTES
HISTORY OF PRESENT ILLNESS Dary Rangel is a 80 y.o. female. HPI Pt. Comes in for blood pressure, cholesterol, and diabetes check. Has been getting up 3-4 times a night to void. Some polydipsia. Not checking blood sugars that often. No complaints of chest pain, shortness of breath, TIAs, claudication or edema. Had some epigastric pain 2 weeks ago, but this is doing better now. No attacks of gout lately. Has also been having some R thigh pain ROS Physical Exam  
Constitutional: She appears well-developed and well-nourished. HENT:  
Right Ear: External ear normal.  
Left Ear: External ear normal.  
Mouth/Throat: Oropharynx is clear and moist.  
Neck: No thyromegaly present. Cardiovascular: Normal rate, regular rhythm and normal heart sounds. Absent pulses Pulmonary/Chest: Breath sounds normal. She has no wheezes. Abdominal: Soft. Bowel sounds are normal. She exhibits no distension and no mass. There is no tenderness. Musculoskeletal: She exhibits no edema. R hip- mild pain on internal and external rotation Lymphadenopathy:  
  She has no cervical adenopathy. Nursing note and vitals reviewed. ASSESSMENT and PLAN Orders Placed This Encounter  METABOLIC PANEL, COMPREHENSIVE  
 CBC WITH AUTOMATED DIFF  
 MICROALBUMIN, UR, RAND W/ MICROALB/CREAT RATIO  LIPID PANEL  
 URIC ACID  
 AMB POC HEMOGLOBIN A1C  chlorthalidone (HYGROTEN) 25 mg tablet Sig: Take 1 Tab by mouth daily. Take 1/2 tab daily for blood pressure Dispense:  90 Tab Refill:  3  
 amLODIPine (NORVASC) 5 mg tablet Sig: Take 1 Tab by mouth daily. TAKE 1 TABLET BY MOUTH EVERY DAY Dispense:  90 Tab Refill:  3  
 atorvastatin (LIPITOR) 20 mg tablet Sig: Take 1 Tab by mouth daily. For cholesterol Dispense:  90 Tab Refill:  3 Orders Placed This Encounter  METABOLIC PANEL, COMPREHENSIVE  
 CBC WITH AUTOMATED DIFF  
 MICROALBUMIN, UR, RAND W/ MICROALB/CREAT RATIO  LIPID PANEL  
 URIC ACID  
 AMB POC HEMOGLOBIN A1C  chlorthalidone (HYGROTEN) 25 mg tablet  amLODIPine (NORVASC) 5 mg tablet  atorvastatin (LIPITOR) 20 mg tablet Diagnoses and all orders for this visit: 1. Controlled type 2 diabetes mellitus with stage 3 chronic kidney disease, with long-term current use of insulin ([facility] [address] -     AMB POC HEMOGLOBIN A1C 
-     MICROALBUMIN, UR, RAND W/ MICROALB/CREAT RATIO 2. Essential hypertension -     METABOLIC PANEL, COMPREHENSIVE 
-     CBC WITH AUTOMATED DIFF 
-     chlorthalidone (HYGROTEN) 25 mg tablet; Take 1 Tab by mouth daily. Take 1/2 tab daily for blood pressure 3. Hypercholesterolemia -     LIPID PANEL 4. Right hip pain 5. Gout, unspecified cause, unspecified chronicity, unspecified site 
-     URIC ACID Other orders 
-     amLODIPine (NORVASC) 5 mg tablet; Take 1 Tab by mouth daily. TAKE 1 TABLET BY MOUTH EVERY DAY 
-     atorvastatin (LIPITOR) 20 mg tablet; Take 1 Tab by mouth daily. For cholesterol Follow-up Disposition: 
Return in about 6 months (around 5/29/2019).

## 2018-11-29 NOTE — PROGRESS NOTES
Percy Ball is a 80 y.o. female Chief Complaint Patient presents with  Hypertension  Gout Visit Vitals /52 (BP 1 Location: Left arm, BP Patient Position: Sitting) Pulse 64 Temp 96.7 °F (35.9 °C) (Oral) Resp 16 Ht 5' 6\" (1.676 m) Wt 186 lb 6.4 oz (84.6 kg) SpO2 95% BMI 30.09 kg/m² Health Maintenance Due Topic Date Due  Shingrix Vaccine Age 50> (1 of 2) 06/04/1982  
 EYE EXAM RETINAL OR DILATED Q1  08/11/2015  GLAUCOMA SCREENING Q2Y  08/11/2016  Influenza Age 5 to Adult  08/01/2018  MEDICARE YEARLY EXAM  08/30/2018  
 FOOT EXAM Q1  11/13/2018  HEMOGLOBIN A1C Q6M  11/24/2018 1. Have you been to the ER, urgent care clinic since your last visit? Hospitalized since your last visit? No 
 
2. Have you seen or consulted any other health care providers outside of the [facility] since your last visit? Include any pap smears or colon screening.  No

## 2018-11-30 LAB
ALBUMIN SERPL-MCNC: 4.5 G/DL (ref 3.5–4.7)
ALBUMIN/CREAT UR: 3.1 MG/G CREAT (ref 0–30)
ALBUMIN/GLOB SERPL: 1.5 {RATIO} (ref 1.2–2.2)
ALP SERPL-CCNC: 105 IU/L (ref 39–117)
ALT SERPL-CCNC: 19 IU/L (ref 0–32)
AST SERPL-CCNC: 24 IU/L (ref 0–40)
BASOPHILS # BLD AUTO: 0 X10E3/UL (ref 0–0.2)
BASOPHILS NFR BLD AUTO: 0 %
BILIRUB SERPL-MCNC: 0.4 MG/DL (ref 0–1.2)
BUN SERPL-MCNC: 33 MG/DL (ref 8–27)
BUN/CREAT SERPL: 17 (ref 12–28)
CALCIUM SERPL-MCNC: 9.7 MG/DL (ref 8.7–10.3)
CHLORIDE SERPL-SCNC: 102 MMOL/L (ref 96–106)
CHOLEST SERPL-MCNC: 146 MG/DL (ref 100–199)
CO2 SERPL-SCNC: 24 MMOL/L (ref 20–29)
CREAT SERPL-MCNC: 2 MG/DL (ref 0.57–1)
CREAT UR-MCNC: 236 MG/DL
EOSINOPHIL # BLD AUTO: 0.2 X10E3/UL (ref 0–0.4)
EOSINOPHIL NFR BLD AUTO: 3 %
ERYTHROCYTE [DISTWIDTH] IN BLOOD BY AUTOMATED COUNT: 17.8 % (ref 12.3–15.4)
GLOBULIN SER CALC-MCNC: 3.1 G/DL (ref 1.5–4.5)
GLUCOSE SERPL-MCNC: 140 MG/DL (ref 65–99)
HCT VFR BLD AUTO: 38.8 % (ref 34–46.6)
HDLC SERPL-MCNC: 51 MG/DL
HGB BLD-MCNC: 12.2 G/DL (ref 11.1–15.9)
IMM GRANULOCYTES # BLD: 0 X10E3/UL (ref 0–0.1)
IMM GRANULOCYTES NFR BLD: 0 %
INTERPRETATION, 910389: NORMAL
INTERPRETATION: NORMAL
INTERPRETATION: NORMAL
LDLC SERPL CALC-MCNC: 72 MG/DL (ref 0–99)
LYMPHOCYTES # BLD AUTO: 3.3 X10E3/UL (ref 0.7–3.1)
LYMPHOCYTES NFR BLD AUTO: 49 %
Lab: NORMAL
Lab: NORMAL
MCH RBC QN AUTO: 27.7 PG (ref 26.6–33)
MCHC RBC AUTO-ENTMCNC: 31.4 G/DL (ref 31.5–35.7)
MCV RBC AUTO: 88 FL (ref 79–97)
MICROALBUMIN UR-MCNC: 7.3 UG/ML
MONOCYTES # BLD AUTO: 0.4 X10E3/UL (ref 0.1–0.9)
MONOCYTES NFR BLD AUTO: 6 %
NEUTROPHILS # BLD AUTO: 2.8 X10E3/UL (ref 1.4–7)
NEUTROPHILS NFR BLD AUTO: 42 %
PDF IMAGE, 910387: NORMAL
PLATELET # BLD AUTO: 292 X10E3/UL (ref 150–379)
POTASSIUM SERPL-SCNC: 4.6 MMOL/L (ref 3.5–5.2)
PROT SERPL-MCNC: 7.6 G/DL (ref 6–8.5)
RBC # BLD AUTO: 4.4 X10E6/UL (ref 3.77–5.28)
SODIUM SERPL-SCNC: 141 MMOL/L (ref 134–144)
TRIGL SERPL-MCNC: 113 MG/DL (ref 0–149)
URATE SERPL-MCNC: 6.8 MG/DL (ref 2.5–7.1)
VLDLC SERPL CALC-MCNC: 23 MG/DL (ref 5–40)
WBC # BLD AUTO: 6.8 X10E3/UL (ref 3.4–10.8)

## 2019-02-13 RX ORDER — SYRINGE-NEEDLE,INSULIN,0.5 ML 30 G X1/2"
SYRINGE, EMPTY DISPOSABLE MISCELLANEOUS
Qty: 100 SYRINGE | Refills: 3 | Status: SHIPPED | OUTPATIENT
Start: 2019-02-13 | End: 2019-12-05 | Stop reason: SDUPTHER

## 2019-02-13 NOTE — TELEPHONE ENCOUNTER
----- Message from [address] sent at 2/13/2019  3:06 PM EST -----  Regarding: Dr. Agnes Sanches  Pt is requesting refill on needles for insulin at [facility] pharmacy on file. Best contact number is [phone].

## 2019-05-30 ENCOUNTER — OFFICE VISIT (OUTPATIENT)
Dept: FAMILY MEDICINE CLINIC | Age: 84
End: 2019-05-30

## 2019-05-30 ENCOUNTER — HOSPITAL ENCOUNTER (OUTPATIENT)
Dept: LAB | Age: 84
Discharge: HOME | End: 2019-05-30
Payer: MEDICARE

## 2019-05-30 VITALS
HEART RATE: 54 BPM | RESPIRATION RATE: 14 BRPM | WEIGHT: 183.4 LBS | BODY MASS INDEX: 29.47 KG/M2 | OXYGEN SATURATION: 95 % | DIASTOLIC BLOOD PRESSURE: 47 MMHG | HEIGHT: 66 IN | SYSTOLIC BLOOD PRESSURE: 131 MMHG | TEMPERATURE: 98.4 F

## 2019-05-30 DIAGNOSIS — E78.00 HYPERCHOLESTEROLEMIA: ICD-10-CM

## 2019-05-30 DIAGNOSIS — E11.8 CONTROLLED TYPE 2 DIABETES MELLITUS WITH COMPLICATION, WITHOUT LONG-TERM CURRENT USE OF INSULIN (HCC): ICD-10-CM

## 2019-05-30 DIAGNOSIS — I10 ESSENTIAL HYPERTENSION: Primary | ICD-10-CM

## 2019-05-30 DIAGNOSIS — M17.0 PRIMARY OSTEOARTHRITIS OF BOTH KNEES: ICD-10-CM

## 2019-05-30 LAB — HBA1C MFR BLD HPLC: 6.4 %

## 2019-05-30 PROCEDURE — 80061 LIPID PANEL: CPT

## 2019-05-30 PROCEDURE — 80053 COMPREHEN METABOLIC PANEL: CPT

## 2019-05-30 PROCEDURE — 85025 COMPLETE CBC W/AUTO DIFF WBC: CPT

## 2019-05-30 PROCEDURE — 36415 COLL VENOUS BLD VENIPUNCTURE: CPT

## 2019-05-30 PROCEDURE — 82043 UR ALBUMIN QUANTITATIVE: CPT

## 2019-05-30 RX ORDER — ACETAMINOPHEN AND CODEINE PHOSPHATE 300; 30 MG/1; MG/1
2 TABLET ORAL
Qty: 90 TAB | Refills: 3 | Status: SHIPPED | OUTPATIENT
Start: 2019-05-30 | End: 2019-06-29

## 2019-05-30 NOTE — PROGRESS NOTES
Chief Complaint   Patient presents with    Cholesterol Problem    Hypertension    Diabetes    Annual Wellness Visit       1. Have you been to the ER, urgent care clinic since your last visit? Hospitalized since your last visit? No    2. Have you seen or consulted any other health care providers outside of the [address] [place] [address] since your last visit? Include any pap smears or colon screening.  No     Health Maintenance Due   Topic Date Due    Shingrix Vaccine Age 49> (1 of 2) 06/04/1982    Pneumococcal 65+ years (2 of 2 - PCV13) 09/01/2012    EYE EXAM RETINAL OR DILATED  08/11/2016    MEDICARE YEARLY EXAM  08/30/2018    FOOT EXAM Q1  11/13/2018    HEMOGLOBIN A1C Q6M  05/29/2019

## 2019-05-30 NOTE — PROGRESS NOTES
HISTORY OF PRESENT ILLNESS  Leandro Schultz is a 80 y.o. female. HPI Pt. Comes in for blood pressure, cholesterol, and diabetes check. Has been having some pains in both knees. No complaints of chest pain, shortness of breath, TIAs, claudication or edema. ROS    Physical Exam   Constitutional: She appears well-developed and well-nourished. HENT:   Right Ear: External ear normal.   Left Ear: External ear normal.   Mouth/Throat: Oropharynx is clear and moist.   Neck: No thyromegaly present. Cardiovascular: Normal rate, regular rhythm, normal heart sounds and intact distal pulses. Pulmonary/Chest: Breath sounds normal. She has no wheezes. Abdominal: Soft. Bowel sounds are normal. She exhibits no distension and no mass. There is no tenderness. Musculoskeletal: She exhibits no edema. Knees- moderate crepitus bilaterally   Lymphadenopathy:     She has no cervical adenopathy. Nursing note and vitals reviewed. ASSESSMENT and PLAN  Orders Placed This Encounter    LIPID PANEL    METABOLIC PANEL, COMPREHENSIVE    CBC WITH AUTOMATED DIFF    MICROALBUMIN, UR, RAND W/ MICROALB/CREAT RATIO    AMB POC HEMOGLOBIN A1C    acetaminophen-codeine (TYLENOL #3) 300-30 mg per tablet     Diagnoses and all orders for this visit:    1. Essential hypertension  -     METABOLIC PANEL, COMPREHENSIVE  -     CBC WITH AUTOMATED DIFF    2. Primary osteoarthritis of both knees  -     acetaminophen-codeine (TYLENOL #3) 300-30 mg per tablet; Take 2 Tabs by mouth every six (6) hours as needed for Pain for up to 30 days. Max Daily Amount: 8 Tabs. TAKE 1 TO 2 TABLETS BY MOUTH EVERY 6 HOURS AS NEEDED FOR PAIN    3. Controlled type 2 diabetes mellitus with complication, without long-term current use of insulin ([facility])  -     AMB POC HEMOGLOBIN A1C  -     MICROALBUMIN, UR, RAND W/ MICROALB/CREAT RATIO    4.  Hypercholesterolemia  -     LIPID PANEL      Follow-up and Dispositions    · Return in about 6 months (around 11/30/2019).

## 2019-05-31 LAB
ALBUMIN SERPL-MCNC: 4.5 G/DL (ref 3.5–4.7)
ALBUMIN/CREAT UR: 2.6 MG/G CREAT (ref 0–30)
ALBUMIN/GLOB SERPL: 1.4 {RATIO} (ref 1.2–2.2)
ALP SERPL-CCNC: 103 IU/L (ref 39–117)
ALT SERPL-CCNC: 11 IU/L (ref 0–32)
AST SERPL-CCNC: 19 IU/L (ref 0–40)
BASOPHILS # BLD AUTO: 0 X10E3/UL (ref 0–0.2)
BASOPHILS NFR BLD AUTO: 0 %
BILIRUB SERPL-MCNC: 0.6 MG/DL (ref 0–1.2)
BUN SERPL-MCNC: 34 MG/DL (ref 8–27)
BUN/CREAT SERPL: 22 (ref 12–28)
CALCIUM SERPL-MCNC: 10.2 MG/DL (ref 8.7–10.3)
CHLORIDE SERPL-SCNC: 103 MMOL/L (ref 96–106)
CHOLEST SERPL-MCNC: 140 MG/DL (ref 100–199)
CO2 SERPL-SCNC: 25 MMOL/L (ref 20–29)
CREAT SERPL-MCNC: 1.56 MG/DL (ref 0.57–1)
CREAT UR-MCNC: 206.5 MG/DL
EOSINOPHIL # BLD AUTO: 0.2 X10E3/UL (ref 0–0.4)
EOSINOPHIL NFR BLD AUTO: 2 %
ERYTHROCYTE [DISTWIDTH] IN BLOOD BY AUTOMATED COUNT: 16.5 % (ref 12.3–15.4)
GLOBULIN SER CALC-MCNC: 3.3 G/DL (ref 1.5–4.5)
GLUCOSE SERPL-MCNC: 85 MG/DL (ref 65–99)
HCT VFR BLD AUTO: 37.9 % (ref 34–46.6)
HDLC SERPL-MCNC: 48 MG/DL
HGB BLD-MCNC: 12.3 G/DL (ref 11.1–15.9)
IMM GRANULOCYTES # BLD AUTO: 0 X10E3/UL (ref 0–0.1)
IMM GRANULOCYTES NFR BLD AUTO: 0 %
INTERPRETATION, 910389: NORMAL
INTERPRETATION: NORMAL
LDLC SERPL CALC-MCNC: 71 MG/DL (ref 0–99)
LYMPHOCYTES # BLD AUTO: 3.1 X10E3/UL (ref 0.7–3.1)
LYMPHOCYTES NFR BLD AUTO: 51 %
Lab: NORMAL
MCH RBC QN AUTO: 29.3 PG (ref 26.6–33)
MCHC RBC AUTO-ENTMCNC: 32.5 G/DL (ref 31.5–35.7)
MCV RBC AUTO: 90 FL (ref 79–97)
MICROALBUMIN UR-MCNC: 5.3 UG/ML
MONOCYTES # BLD AUTO: 0.4 X10E3/UL (ref 0.1–0.9)
MONOCYTES NFR BLD AUTO: 7 %
NEUTROPHILS # BLD AUTO: 2.5 X10E3/UL (ref 1.4–7)
NEUTROPHILS NFR BLD AUTO: 40 %
PDF IMAGE, 910387: NORMAL
PLATELET # BLD AUTO: 283 X10E3/UL (ref 150–450)
POTASSIUM SERPL-SCNC: 4.3 MMOL/L (ref 3.5–5.2)
PROT SERPL-MCNC: 7.8 G/DL (ref 6–8.5)
RBC # BLD AUTO: 4.2 X10E6/UL (ref 3.77–5.28)
SODIUM SERPL-SCNC: 143 MMOL/L (ref 134–144)
TRIGL SERPL-MCNC: 104 MG/DL (ref 0–149)
VLDLC SERPL CALC-MCNC: 21 MG/DL (ref 5–40)
WBC # BLD AUTO: 6.1 X10E3/UL (ref 3.4–10.8)

## 2019-06-07 ENCOUNTER — OFFICE VISIT (OUTPATIENT)
Dept: CARDIOLOGY CLINIC | Age: 84
End: 2019-06-07

## 2019-06-07 VITALS
BODY MASS INDEX: 29.62 KG/M2 | WEIGHT: 184.3 LBS | RESPIRATION RATE: 18 BRPM | SYSTOLIC BLOOD PRESSURE: 102 MMHG | OXYGEN SATURATION: 96 % | DIASTOLIC BLOOD PRESSURE: 66 MMHG | HEIGHT: 66 IN | HEART RATE: 55 BPM

## 2019-06-07 DIAGNOSIS — E78.2 MIXED HYPERLIPIDEMIA: ICD-10-CM

## 2019-06-07 DIAGNOSIS — N18.30 CKD (CHRONIC KIDNEY DISEASE), STAGE III (HCC): ICD-10-CM

## 2019-06-07 DIAGNOSIS — I25.10 ASHD (ARTERIOSCLEROTIC HEART DISEASE): Primary | ICD-10-CM

## 2019-06-07 DIAGNOSIS — Z53.1 REFUSAL OF BLOOD TRANSFUSIONS AS PATIENT IS JEHOVAH'S WITNESS: ICD-10-CM

## 2019-06-07 DIAGNOSIS — E11.21 TYPE 2 DIABETES WITH NEPHROPATHY (HCC): ICD-10-CM

## 2019-06-07 DIAGNOSIS — I10 ESSENTIAL HYPERTENSION: ICD-10-CM

## 2019-06-07 DIAGNOSIS — R01.1 CARDIAC MURMUR: ICD-10-CM

## 2019-06-07 DIAGNOSIS — Z95.1 S/P CABG X 4: ICD-10-CM

## 2019-06-07 NOTE — PROGRESS NOTES
1. Have you been to the ER, urgent care clinic since your last visit? Hospitalized since your last visit? No.    2. Have you seen or consulted any other health care providers outside of the [address] [place] [address] since your last visit? Include any pap smears or colon screening. Seen by [facility] for heart bypass in 2005/2006.         Chief Complaint   Patient presents with    New Patient     had heart bypass in 2005/2006 and has not seen cardiology since then-pt denies any cardiac symptoms

## 2019-06-07 NOTE — PROGRESS NOTES
[address] [place] [address], [address] [facility]  [phone]     Subjective:      Manuel Khan is a 80 y.o. female with pmhx ASHD CABG x 4 in 2005 at [facility], HTN HLD IDDM CKD III is here to establish care. She denies any exertional cp or sob, however, she doesn't really exert herself d/t limiting knee pain. She remains active in her Judaism, serves as a . Has occasional ankle swelling which improves with compression socks. The patient denies chest pain/ shortness of breath, orthopnea, PND, palpitations, syncope, or presyncope. Patient Active Problem List    Diagnosis Date Noted    Type 2 diabetes with nephropathy ([facility] 03/15/2018    Refusal of blood transfusions as patient is Restorationism 02/26/2013    Diabetes mellitus ([facility] 05/01/2012    Osteoarthritis 05/01/2012    Coronary artery disease 05/01/2012    Hypertension     Hypercholesterolemia       Lelia Farrell MD  Past Medical History:   Diagnosis Date    Diabetes ([facility]     Hypercholesterolemia     Hypertension     Other ill-defined conditions(799.89)     gout    Peripheral vascular disease ([facility]     Refusal of blood transfusions as patient is Restorationism 2/26/2013      Past Surgical History:   Procedure Laterality Date    CABG, ARTERIAL, FOUR+  2005- Dr. Silvia Cronin  2014- demi    L popliteal artery    HX CHOLECYSTECTOMY      HX HYSTERECTOMY       Allergies   Allergen Reactions    Contrast Agent [Iodine] Other (comments)     Affects kidneys    Morphine Other (comments)     Pt does not know the reaction      No family history on file.    Social History     Socioeconomic History    Marital status:      Spouse name: Not on file    Number of children: Not on file    Years of education: Not on file    Highest education level: Not on file   Occupational History    Not on file   Social Needs    Financial resource strain: Not on file    Food insecurity:     Worry: Not on file     Inability: Not on file    Transportation needs:     Medical: Not on file     Non-medical: Not on file   Tobacco Use    Smoking status: Never Smoker    Smokeless tobacco: Never Used   Substance and Sexual Activity    Alcohol use: No    Drug use: No    Sexual activity: Not on file   Lifestyle    Physical activity:     Days per week: Not on file     Minutes per session: Not on file    Stress: Not on file   Relationships    Social connections:     Talks on phone: Not on file     Gets together: Not on file     Attends [employer] service: Not on file     Active member of club or organization: Not on file     Attends meetings of clubs or organizations: Not on file     Relationship status: Not on file    Intimate partner violence:     Fear of current or ex partner: Not on file     Emotionally abused: Not on file     Physically abused: Not on file     Forced sexual activity: Not on file   Other Topics Concern    Not on file   Social History Narrative    , 2 children, son lives with her at times      Current Outpatient Medications   Medication Sig    acetaminophen-codeine (TYLENOL #3) 300-30 mg per tablet Take 2 Tabs by mouth every six (6) hours as needed for Pain for up to 30 days. Max Daily Amount: 8 Tabs. TAKE 1 TO 2 TABLETS BY MOUTH EVERY 6 HOURS AS NEEDED FOR PAIN    Insulin Syringe-Needle U-100 (BD INSULIN SYRINGE ULTRA-FINE) 0.5 mL 30 gauge x 1/2\" syrg USE AS DIRECTED WITH INSULIN    chlorthalidone (HYGROTEN) 25 mg tablet Take 1 Tab by mouth daily. Take 1/2 tab daily for blood pressure (Patient taking differently: Take 25 mg by mouth daily.)    amLODIPine (NORVASC) 5 mg tablet Take 1 Tab by mouth daily. TAKE 1 TABLET BY MOUTH EVERY DAY    atorvastatin (LIPITOR) 20 mg tablet Take 1 Tab by mouth daily.  For cholesterol    insulin NPH (NOVOLIN N NPH U-100 INSULIN) 100 unit/mL injection Inject 24 units in am and 12 units in pm    allopurinol (ZYLOPRIM) 100 mg tablet Take 2 Tabs by mouth daily. To prevent gout (Patient taking differently: Take 100 mg by mouth daily. To prevent gout)    losartan (COZAAR) 25 mg tablet 1 tab daily    aspirin 81 mg tablet Take 81 mg by mouth daily. No current facility-administered medications for this visit. Review of Symptoms:  11 systems reviewed, negative other than as stated in the HPI    Physical ExamPhysical Exam:    Vitals:    06/07/19 1024 06/07/19 1053   BP: 106/66 102/66   Pulse: (!) 55    Resp: 18    SpO2: 96%    Weight: 184 lb 4.8 oz (83.6 kg)    Height: 5' 6\" (1.676 m)      Body mass index is 29.75 kg/m². General PE   Gen:  NAD  Mental Status - Alert. General Appearance - Not in acute distress. Chest and Lung Exam   Inspection: Accessory muscles - No use of accessory muscles in breathing. Auscultation:   Breath sounds: - Normal.   Cardiovascular   Inspection: Jugular vein - Bilateral - Inspection Normal.   Palpation/Percussion:   Apical Impulse: - Normal.   Auscultation: Rhythm - Regular. Heart Sounds - S1 WNL and S2 WNL. No S3 or S4. Murmurs & Other Heart Sounds: Auscultation of the heart reveals - No Murmurs. Peripheral Vascular   Upper Extremity: Inspection - Bilateral - No Cyanotic nailbeds or Digital clubbing. Lower Extremity:   Palpation: Edema - Bilateral - No edema. Abdomen:   Soft, non-tender, bowel sounds are active.   Neuro: A&O times 3, CN and motor grossly WNL    Labs:   Lab Results   Component Value Date/Time    Cholesterol, total 140 05/30/2019 10:43 AM    Cholesterol, total 146 11/29/2018 11:37 AM    Cholesterol, total 134 05/24/2018 11:19 AM    Cholesterol, total 145 11/13/2017 12:36 PM    Cholesterol, total 140 07/06/2017 10:25 AM    HDL Cholesterol 48 05/30/2019 10:43 AM    HDL Cholesterol 51 11/29/2018 11:37 AM    HDL Cholesterol 48 05/24/2018 11:19 AM    HDL Cholesterol 49 11/13/2017 12:36 PM    HDL Cholesterol 50 07/06/2017 10:25 AM    LDL, calculated 71 05/30/2019 10:43 AM    LDL, calculated 72 11/29/2018 11:37 AM    LDL, calculated 63 05/24/2018 11:19 AM    LDL, calculated 76 11/13/2017 12:36 PM    LDL, calculated 69 07/06/2017 10:25 AM    Triglyceride 104 05/30/2019 10:43 AM    Triglyceride 113 11/29/2018 11:37 AM    Triglyceride 113 05/24/2018 11:19 AM    Triglyceride 100 11/13/2017 12:36 PM    Triglyceride 106 07/06/2017 10:25 AM     No results found for: CPK, CPKX, CPX  Lab Results   Component Value Date/Time    Sodium 143 05/30/2019 10:43 AM    Potassium 4.3 05/30/2019 10:43 AM    Chloride 103 05/30/2019 10:43 AM    CO2 25 05/30/2019 10:43 AM    Glucose 85 05/30/2019 10:43 AM    BUN 34 (H) 05/30/2019 10:43 AM    Creatinine 1.56 (H) 05/30/2019 10:43 AM    BUN/Creatinine ratio 22 05/30/2019 10:43 AM    GFR est AA 34 (L) 05/30/2019 10:43 AM    GFR est non-AA 30 (L) 05/30/2019 10:43 AM    Calcium 10.2 05/30/2019 10:43 AM    Bilirubin, total 0.6 05/30/2019 10:43 AM    AST (SGOT) 19 05/30/2019 10:43 AM    Alk. phosphatase 103 05/30/2019 10:43 AM    Protein, total 7.8 05/30/2019 10:43 AM    Albumin 4.5 05/30/2019 10:43 AM    A-G Ratio 1.4 05/30/2019 10:43 AM    ALT (SGPT) 11 05/30/2019 10:43 AM       EKG:  SB 1 AVB     Assessment:     Assessment:      1. ASHD (arteriosclerotic heart disease)    2. Mixed hyperlipidemia    3. Essential hypertension    4. Type 2 diabetes with nephropathy ([address]    5. S/P CABG x 4    6. Cardiac murmur    7. CKD (chronic kidney disease), stage III ([address]    8. Refusal of blood transfusions as patient is Evangelical        Orders Placed This Encounter    AMB POC EKG ROUTINE W/ 12 LEADS, INTER & REP     Order Specific Question:   Reason for Exam:     Answer:   routine        Plan: This is a 80 y.o. female with pmhx ASHD CABG x 4 in 2005 at [facility], HTN HLD IDDM CKD III is here to establish care. She denies any exertional cp or sob, however, she doesn't really exert herself d/t limiting knee pain. She remains active in her Buddhism, serves as a .  Has occasional ankle swelling which improves with compression socks. ASHD, Hx CABG x 4 in 2005  Continue ASA, statin  Will obtain roxana as no work up has even been done since surgery    Cardiac murmur  Obtain echo    HTN  Controlled with current therapy    HLD  5/19 LDL 71. On statin. Lipids and labs followed by PCP. DM  On Insulin therapy    CKD III  Followed by Dr Rhonda Armas current care and f/u when testing complete      Ray Ruiz NP       [facility]    6/9/2019         Patient seen, examined by me personally. Plan discussed as detailed. Agree with note as outlined by  NP. I confirm findings in history and physical exam. No additional findings noted. Agree with plan as outlined above.      Smitha Christensen MD

## 2019-07-18 DIAGNOSIS — I10 ESSENTIAL HYPERTENSION: ICD-10-CM

## 2019-07-18 RX ORDER — ALLOPURINOL 100 MG/1
200 TABLET ORAL DAILY
Qty: 180 TAB | Refills: 3 | Status: SHIPPED | OUTPATIENT
Start: 2019-07-18 | End: 2021-06-03 | Stop reason: SDUPTHER

## 2019-07-18 RX ORDER — CHLORTHALIDONE 25 MG/1
25 TABLET ORAL DAILY
Qty: 90 TAB | Refills: 3 | Status: SHIPPED | OUTPATIENT
Start: 2019-07-18 | End: 2019-07-23 | Stop reason: SDUPTHER

## 2019-07-18 RX ORDER — ATORVASTATIN CALCIUM 20 MG/1
20 TABLET, FILM COATED ORAL DAILY
Qty: 90 TAB | Refills: 3 | Status: SHIPPED | OUTPATIENT
Start: 2019-07-18 | End: 2020-07-13

## 2019-07-18 RX ORDER — AMLODIPINE BESYLATE 5 MG/1
5 TABLET ORAL DAILY
Qty: 90 TAB | Refills: 3 | Status: SHIPPED | OUTPATIENT
Start: 2019-07-18 | End: 2020-07-13

## 2019-07-18 RX ORDER — LOSARTAN POTASSIUM 25 MG/1
TABLET ORAL
Qty: 90 TAB | Refills: 3 | Status: SHIPPED | OUTPATIENT
Start: 2019-07-18 | End: 2020-07-13

## 2019-07-23 DIAGNOSIS — I10 ESSENTIAL HYPERTENSION: ICD-10-CM

## 2019-07-23 RX ORDER — CHLORTHALIDONE 25 MG/1
25 TABLET ORAL DAILY
Qty: 90 TAB | Refills: 3 | Status: SHIPPED | OUTPATIENT
Start: 2019-07-23 | End: 2019-07-26 | Stop reason: SDUPTHER

## 2019-07-23 NOTE — TELEPHONE ENCOUNTER
Patient 's daughter states that [employer] sent over a RX it was suppose to have on it 90 90 but instead he put down 90 77 chlorthalidone (HYGROTEN) 25 mg tablet she would like for him to correct it and give her a call @ [phone]

## 2019-07-23 NOTE — TELEPHONE ENCOUNTER
Request for chlorthalidone 25mg every day. Last office visit 5/30/19, next office visit 12/5/19.  Refill pending for provider approval.

## 2019-07-26 DIAGNOSIS — I10 ESSENTIAL HYPERTENSION: ICD-10-CM

## 2019-07-26 RX ORDER — CHLORTHALIDONE 25 MG/1
25 TABLET ORAL DAILY
Qty: 90 TAB | Refills: 3 | Status: SHIPPED | OUTPATIENT
Start: 2019-07-26 | End: 2020-07-13

## 2019-12-05 ENCOUNTER — OFFICE VISIT (OUTPATIENT)
Dept: FAMILY MEDICINE CLINIC | Age: 84
End: 2019-12-05

## 2019-12-05 ENCOUNTER — HOSPITAL ENCOUNTER (OUTPATIENT)
Dept: LAB | Age: 84
Discharge: HOME | End: 2019-12-05
Payer: MEDICARE

## 2019-12-05 VITALS
WEIGHT: 187 LBS | HEIGHT: 66 IN | HEART RATE: 59 BPM | DIASTOLIC BLOOD PRESSURE: 55 MMHG | TEMPERATURE: 96.5 F | BODY MASS INDEX: 30.05 KG/M2 | OXYGEN SATURATION: 97 % | SYSTOLIC BLOOD PRESSURE: 120 MMHG

## 2019-12-05 DIAGNOSIS — Z79.4 CONTROLLED TYPE 2 DIABETES MELLITUS WITH STAGE 3 CHRONIC KIDNEY DISEASE, WITH LONG-TERM CURRENT USE OF INSULIN (HCC): ICD-10-CM

## 2019-12-05 DIAGNOSIS — N18.30 CONTROLLED TYPE 2 DIABETES MELLITUS WITH STAGE 3 CHRONIC KIDNEY DISEASE, WITH LONG-TERM CURRENT USE OF INSULIN (HCC): ICD-10-CM

## 2019-12-05 DIAGNOSIS — E11.22 CONTROLLED TYPE 2 DIABETES MELLITUS WITH STAGE 3 CHRONIC KIDNEY DISEASE, WITH LONG-TERM CURRENT USE OF INSULIN (HCC): ICD-10-CM

## 2019-12-05 DIAGNOSIS — Z00.00 ENCOUNTER FOR MEDICARE ANNUAL WELLNESS EXAM: Primary | ICD-10-CM

## 2019-12-05 DIAGNOSIS — I10 ESSENTIAL HYPERTENSION: ICD-10-CM

## 2019-12-05 DIAGNOSIS — E78.00 HYPERCHOLESTEROLEMIA: ICD-10-CM

## 2019-12-05 LAB — HBA1C MFR BLD HPLC: 6 %

## 2019-12-05 PROCEDURE — 80053 COMPREHEN METABOLIC PANEL: CPT

## 2019-12-05 PROCEDURE — 80061 LIPID PANEL: CPT

## 2019-12-05 RX ORDER — ACETAMINOPHEN AND CODEINE PHOSPHATE 300; 30 MG/1; MG/1
1 TABLET ORAL
COMMUNITY
End: 2020-06-03 | Stop reason: SDUPTHER

## 2019-12-05 RX ORDER — SYRINGE-NEEDLE,INSULIN,0.5 ML 30 G X1/2"
SYRINGE, EMPTY DISPOSABLE MISCELLANEOUS
Qty: 100 SYRINGE | Refills: 3 | Status: SHIPPED | OUTPATIENT
Start: 2019-12-05

## 2019-12-05 RX ORDER — AMOXICILLIN AND CLAVULANATE POTASSIUM 500; 125 MG/1; MG/1
1 TABLET, FILM COATED ORAL 2 TIMES DAILY
Qty: 14 TAB | Refills: 0 | Status: SHIPPED | OUTPATIENT
Start: 2019-12-05 | End: 2019-12-15

## 2019-12-05 NOTE — PROGRESS NOTES
HISTORY OF PRESENT ILLNESS  Manoj Valadez is a 80 y.o. female. HPI Pt. Comes in for blood pressure, cholesterol, and diabetes check. Has had some pain in L  Proximal mandibular area since Sunday. Took a pain pill- no relief. No complaints of chest pain, shortness of breath, TIAs, claudication or edema. Not checking blood sugars no hypoglycemic episodes. Sees ophthalmology (Dr. Nikko Suarez). Declines flu and prevnar shots. Review of Systems   HENT: Negative for hearing loss. Neurological:        No falls, no  Walkers. Walks with a cane. Independent in all adls. Psychiatric/Behavioral: Negative for depression. No alcohol       Physical Exam  Vitals signs and nursing note reviewed. Constitutional:       Appearance: She is well-developed. HENT:      Head:      Comments: Moderate swelling of submandibular lymph nodes. Teeth look ok     Right Ear: External ear normal.      Left Ear: External ear normal.      Mouth/Throat:      Pharynx: Posterior oropharyngeal erythema: augmentin. Neck:      Thyroid: No thyromegaly. Cardiovascular:      Rate and Rhythm: Normal rate and regular rhythm. Heart sounds: Normal heart sounds. Pulmonary:      Breath sounds: Normal breath sounds. No wheezing. Abdominal:      General: Bowel sounds are normal. There is no distension. Palpations: Abdomen is soft. There is no mass. Tenderness: There is no tenderness. Lymphadenopathy:      Cervical: No cervical adenopathy. Neurological:      Comments: Amt 4 4/4         ASSESSMENT and PLAN  Orders Placed This Encounter    LIPID PANEL    METABOLIC PANEL, COMPREHENSIVE    AMB POC COMPLETE CBC, AUTOMATED    AMB POC HEMOGLOBIN A1C    Insulin Syringe-Needle U-100 (BD INSULIN SYRINGE ULTRA-FINE) 0.5 mL 30 gauge x 1/2\" syrg    amoxicillin-clavulanate (AUGMENTIN) 500-125 mg per tablet     Diagnoses and all orders for this visit:    1. Encounter for Medicare annual wellness exam    2.  Essential hypertension  -     AMB POC COMPLETE CBC, AUTOMATED  -     METABOLIC PANEL, COMPREHENSIVE    3. Controlled type 2 diabetes mellitus with stage 3 chronic kidney disease, with long-term current use of insulin (HCC)  -     AMB POC HEMOGLOBIN A1C    4. Hypercholesterolemia  -     LIPID PANEL    Other orders  -     Insulin Syringe-Needle U-100 (BD INSULIN SYRINGE ULTRA-FINE) 0.5 mL 30 gauge x 1/2\" syrg; USE AS DIRECTED WITH INSULIN  -     amoxicillin-clavulanate (AUGMENTIN) 500-125 mg per tablet; Take 1 Tab by mouth two (2) times a day for 10 days. Follow-up and Dispositions    · Return in about 6 months (around 6/5/2020).

## 2019-12-05 NOTE — PROGRESS NOTES
Verified patient with two types of identifiers. Verified pharmacy with patient. Verified medications with patient medications bottles. 1. Have you been to the ER, urgent care clinic since your last visit? Hospitalized since your last visit? No    2. Have you seen or consulted any other health care providers outside of the [address] [place] [address] since your last visit? Include any pap smears or colon screening.  No

## 2019-12-06 LAB
ALBUMIN SERPL-MCNC: 4.3 G/DL (ref 3.5–4.7)
ALBUMIN/GLOB SERPL: 1.3 {RATIO} (ref 1.2–2.2)
ALP SERPL-CCNC: 107 IU/L (ref 39–117)
ALT SERPL-CCNC: 12 IU/L (ref 0–32)
AST SERPL-CCNC: 18 IU/L (ref 0–40)
BILIRUB SERPL-MCNC: 0.4 MG/DL (ref 0–1.2)
BUN SERPL-MCNC: 35 MG/DL (ref 8–27)
BUN/CREAT SERPL: 20 (ref 12–28)
CALCIUM SERPL-MCNC: 9.8 MG/DL (ref 8.7–10.3)
CHLORIDE SERPL-SCNC: 101 MMOL/L (ref 96–106)
CHOLEST SERPL-MCNC: 137 MG/DL (ref 100–199)
CO2 SERPL-SCNC: 24 MMOL/L (ref 20–29)
CREAT SERPL-MCNC: 1.76 MG/DL (ref 0.57–1)
GLOBULIN SER CALC-MCNC: 3.4 G/DL (ref 1.5–4.5)
GLUCOSE SERPL-MCNC: 95 MG/DL (ref 65–99)
HDLC SERPL-MCNC: 50 MG/DL
INTERPRETATION, 910389: NORMAL
INTERPRETATION: NORMAL
LDLC SERPL CALC-MCNC: 63 MG/DL (ref 0–99)
PDF IMAGE, 910387: NORMAL
POTASSIUM SERPL-SCNC: 3.7 MMOL/L (ref 3.5–5.2)
PROT SERPL-MCNC: 7.7 G/DL (ref 6–8.5)
SODIUM SERPL-SCNC: 141 MMOL/L (ref 134–144)
TRIGL SERPL-MCNC: 119 MG/DL (ref 0–149)
VLDLC SERPL CALC-MCNC: 24 MG/DL (ref 5–40)

## 2019-12-27 ENCOUNTER — OFFICE VISIT (OUTPATIENT)
Dept: FAMILY MEDICINE CLINIC | Age: 84
End: 2019-12-27

## 2019-12-27 VITALS
TEMPERATURE: 96 F | BODY MASS INDEX: 30.05 KG/M2 | RESPIRATION RATE: 18 BRPM | WEIGHT: 187 LBS | SYSTOLIC BLOOD PRESSURE: 125 MMHG | HEIGHT: 66 IN | HEART RATE: 61 BPM | OXYGEN SATURATION: 93 % | DIASTOLIC BLOOD PRESSURE: 49 MMHG

## 2019-12-27 DIAGNOSIS — M17.11 PRIMARY OSTEOARTHRITIS OF RIGHT KNEE: Primary | ICD-10-CM

## 2019-12-27 RX ORDER — METHYLPREDNISOLONE ACETATE 40 MG/ML
40 INJECTION, SUSPENSION INTRA-ARTICULAR; INTRALESIONAL; INTRAMUSCULAR; SOFT TISSUE ONCE
Qty: 1 ML | Refills: 0
Start: 2019-12-27 | End: 2019-12-27

## 2019-12-27 NOTE — PROGRESS NOTES
HISTORY OF PRESENT ILLNESS  Sabra Nunez is a 80 y.o. female. HPI Has been having R knee pain for the last week. No hx trauma. Has had trouble walking. Took tylenol and cream- hasnt helped. Has been helped by a cortisone shot in the past..     ROS    Physical Exam  Vitals signs and nursing note reviewed. Constitutional:       Appearance: She is well-developed. HENT:      Right Ear: External ear normal.      Left Ear: External ear normal.   Neck:      Thyroid: No thyromegaly. Cardiovascular:      Rate and Rhythm: Normal rate and regular rhythm. Heart sounds: Normal heart sounds. Pulmonary:      Breath sounds: Normal breath sounds. No wheezing. Abdominal:      General: Bowel sounds are normal. There is no distension. Palpations: Abdomen is soft. There is no mass. Tenderness: There is no tenderness. Musculoskeletal:      Comments: R knee - moderate crepitus   Lymphadenopathy:      Cervical: No cervical adenopathy. ASSESSMENT and PLAN  Orders Placed This Encounter    (DEPO-MEDROL 40 mg  -   quantity 1 for Reimbursement) METHYLPREDNISOLONE ACETATE 40 mg injection    86791 - DRAIN/INJECT LARGE JOINT/BURSA    methylPREDNISolone acetate (DEPO-MEDROL) 40 mg/mL injection     Diagnoses and all orders for this visit:    1. Primary osteoarthritis of right knee  -     methylPREDNISolone acetate (DEPO-MEDROL) 40 mg/mL injection; 1 mL by IntraMUSCular route once for 1 dose.   -     METHYLPREDNISOLONE ACETATE INJECTION 40 MG  -     [place] DRAIN/INJECT LARGE JOINT/BURSA

## 2019-12-27 NOTE — PROGRESS NOTES
Chief Complaint   Patient presents with    Knee Pain     Right x 3 weeks     1. Have you been to the ER, urgent care clinic since your last visit? Hospitalized since your last visit? No    2. Have you seen or consulted any other health care providers outside of the [address] [place] [address] since your last visit? Include any pap smears or colon screening.  No    [address]  OFFICE PROCEDURE PROGRESS NOTE        Chart reviewed for the following:   Nat WILKINSON, have reviewed the History, Physical and updated the Allergic reactions for [address] performed immediately prior to start of procedure:   Nat WILKINSON, have performed the following reviews on [address] prior to the start of the procedure:            * Patient was identified by name and date of birth   * Agreement on procedure being performed was verified  * Risks and Benefits explained to the patient  * Procedure site verified and marked as necessary  * Patient was positioned for comfort  * Consent was signed and verified     Time: 11:25      Date of procedure: 12/27/2019    Procedure performed by:  Vee Pascal MD    Provider assisted by:Nat Jordan    Patient assisted by: daughter    How tolerated by patient: tolerated the procedure well with no complications    Post Procedural Pain Scale: 2 - Hurts Little Bit    Comments: none        TIME ENDED:11:35am    PROCEDURE: Right Knee Steroid Injection    PRE-PAIN:10  POST-PAIN:2    SPECIMEN SENT TO LAB:None

## 2019-12-27 NOTE — PATIENT INSTRUCTIONS
Learning About Joint Injections  What are joint injections? Joint injections are shots into a joint, such as the knee or shoulder. They are used to put in medicines, such as pain relievers and steroid medicines. Steroids can be injected directly into a swollen and painful joint to reduce inflammation. A steroid shot can sometimes help with short-term pain relief when other treatments haven't worked. If steroid shots help, pain may improve for weeks or months. How are they done? First, the area over the joint will be cleaned. Your doctor may then use a tiny needle to numb the skin in the area where you will get the joint injection. If a tiny needle is used to numb the area, your doctor will use another needle to inject the medicine. Your doctor may use a pain reliever, a steroid, or both. You may feel some pressure or discomfort. The procedure takes 10 to 30 minutes. But the injection itself usually takes only a few minutes. Your doctor may put ice on the area before you go home. You will probably go home soon after your shot. What can you expect after a joint injection? You may have numbness around the joint for a few hours. If your shot included both a pain reliever and a steroid, then the pain will probably go away right away. But it might come back after a few hours. This might happen if the pain reliever wears off and the steroid hasn't started to work yet. Steroids don't always work. But when they do, the pain relief can last for several days to a few months or longer. Your doctor may tell you to use ice on the area. You can also use ice if the pain comes back. Put ice or a cold pack on your joint for 10 to 20 minutes at a time. Put a thin cloth between the ice and your skin. Follow your doctor's instructions carefully. Follow-up care is a key part of your treatment and safety. Be sure to make and go to all appointments, and call your doctor if you are having problems.  It's also a good idea to know your test results and keep a list of the medicines you take. Where can you learn more? Go to http://lynne-varsha.info/. Enter R920 in the search box to learn more about \"Learning About Joint Injections. \"  Current as of: June 26, 2019  Content Version: 12.2  © 2970-2858 [employer]. Care instructions adapted under license by [employer] (which disclaims liability or warranty for this information). If you have questions about a medical condition or this instruction, always ask your healthcare professional. [address] any warranty or liability for your use of this information.

## 2020-06-03 ENCOUNTER — OFFICE VISIT (OUTPATIENT)
Dept: FAMILY MEDICINE CLINIC | Age: 85
End: 2020-06-03

## 2020-06-03 VITALS
RESPIRATION RATE: 16 BRPM | SYSTOLIC BLOOD PRESSURE: 127 MMHG | OXYGEN SATURATION: 95 % | WEIGHT: 185 LBS | HEART RATE: 64 BPM | DIASTOLIC BLOOD PRESSURE: 53 MMHG | TEMPERATURE: 98.7 F | BODY MASS INDEX: 29.86 KG/M2

## 2020-06-03 DIAGNOSIS — G89.29 CHRONIC PAIN OF BOTH KNEES: ICD-10-CM

## 2020-06-03 DIAGNOSIS — E78.00 HYPERCHOLESTEROLEMIA: ICD-10-CM

## 2020-06-03 DIAGNOSIS — M25.562 CHRONIC PAIN OF BOTH KNEES: ICD-10-CM

## 2020-06-03 DIAGNOSIS — I10 ESSENTIAL HYPERTENSION: Primary | ICD-10-CM

## 2020-06-03 DIAGNOSIS — N18.30 CONTROLLED TYPE 2 DIABETES MELLITUS WITH STAGE 3 CHRONIC KIDNEY DISEASE, WITH LONG-TERM CURRENT USE OF INSULIN (HCC): ICD-10-CM

## 2020-06-03 DIAGNOSIS — Z79.4 CONTROLLED TYPE 2 DIABETES MELLITUS WITH STAGE 3 CHRONIC KIDNEY DISEASE, WITH LONG-TERM CURRENT USE OF INSULIN (HCC): ICD-10-CM

## 2020-06-03 DIAGNOSIS — M17.0 PRIMARY OSTEOARTHRITIS OF BOTH KNEES: ICD-10-CM

## 2020-06-03 DIAGNOSIS — E11.22 CONTROLLED TYPE 2 DIABETES MELLITUS WITH STAGE 3 CHRONIC KIDNEY DISEASE, WITH LONG-TERM CURRENT USE OF INSULIN (HCC): ICD-10-CM

## 2020-06-03 DIAGNOSIS — M25.561 CHRONIC PAIN OF BOTH KNEES: ICD-10-CM

## 2020-06-03 DIAGNOSIS — M1A.9XX0 CHRONIC GOUT WITHOUT TOPHUS, UNSPECIFIED CAUSE, UNSPECIFIED SITE: ICD-10-CM

## 2020-06-03 RX ORDER — ACETAMINOPHEN AND CODEINE PHOSPHATE 300; 30 MG/1; MG/1
1 TABLET ORAL
Qty: 60 TAB | Refills: 1 | Status: SHIPPED | OUTPATIENT
Start: 2020-06-03 | End: 2020-07-03

## 2020-06-03 RX ORDER — SENNOSIDES 25 MG/1
TABLET, FILM COATED ORAL
Qty: 15 G | Refills: 3 | Status: SHIPPED | OUTPATIENT
Start: 2020-06-03

## 2020-06-03 NOTE — PROGRESS NOTES
Chief Complaint   Patient presents with    Diabetes     F/U on diabetes.  Hypertension     F/U on BP.  Cholesterol Problem     F/U on cholesterol. 1. Have you been to the ER, urgent care clinic since your last visit? Hospitalized since your last visit? No    2. Have you seen or consulted any other health care providers outside of the [address] [place] [address] since your last visit? Include any pap smears or colon screening.  No

## 2020-06-03 NOTE — PROGRESS NOTES
HISTORY OF PRESENT ILLNESS  Sadiq Carlisle is a 80 y.o. female. HPI In for diabetes, cholesterol and blood pressure check. Has been doing reasonably well. No complaints of chest pain, shortness of breath, TIAs, claudication or edema. Daughter is Yosvany Layne. Son . Pt lives by herself. Gets some pain in knees, helped by tylenol with codeine. No gout since being on allopurinol. ROS    Physical Exam  Vitals signs and nursing note reviewed. Constitutional:       Appearance: She is well-developed. HENT:      Right Ear: External ear normal.      Left Ear: External ear normal.   Neck:      Thyroid: No thyromegaly. Cardiovascular:      Rate and Rhythm: Normal rate and regular rhythm. Heart sounds: Normal heart sounds. Pulmonary:      Breath sounds: Normal breath sounds. No wheezing. Abdominal:      General: Bowel sounds are normal. There is no distension. Palpations: Abdomen is soft. There is no mass. Tenderness: There is no abdominal tenderness. Musculoskeletal:      Comments: Knees- moderate crepitus bilaterally   Lymphadenopathy:      Cervical: No cervical adenopathy. ASSESSMENT and PLAN  Orders Placed This Encounter    CBC WITH AUTOMATED DIFF    METABOLIC PANEL, COMPREHENSIVE    LIPID PANEL    HEMOGLOBIN A1C WITH EAG    URIC ACID    glucose blood VI test strips (ASCENSIA AUTODISC VI, ONE TOUCH ULTRA TEST VI) strip    lidocaine 5 % topical cream    acetaminophen-codeine (Tylenol-Codeine #3) 300-30 mg per tablet     Diagnoses and all orders for this visit:    1. Essential hypertension  -     CBC WITH AUTOMATED DIFF  -     METABOLIC PANEL, COMPREHENSIVE    2. Hypercholesterolemia  -     LIPID PANEL    3. Controlled type 2 diabetes mellitus with stage 3 chronic kidney disease, with long-term current use of insulin (HCC)  -     HEMOGLOBIN A1C WITH EAG    4. Chronic pain of both knees  -     acetaminophen-codeine (Tylenol-Codeine #3) 300-30 mg per tablet;  Take 1 Tab by mouth every six (6) hours as needed for Pain for up to 30 days. Max Daily Amount: 4 Tabs. 5. Chronic gout without tophus, unspecified cause, unspecified site  -     URIC ACID    6. Primary osteoarthritis of both knees    Other orders  -     glucose blood VI test strips (ASCENSIA AUTODISC VI, ONE TOUCH ULTRA TEST VI) strip; TEST ONE TIME DAILY. -     lidocaine 5 % topical cream; Apply  to affected area two (2) times daily as needed for Pain. Follow-up and Dispositions    · Return in about 6 months (around 12/3/2020).

## 2020-06-09 ENCOUNTER — HOSPITAL ENCOUNTER (OUTPATIENT)
Dept: LAB | Age: 85
Discharge: HOME | End: 2020-06-09
Payer: MEDICARE

## 2020-06-09 PROCEDURE — 80053 COMPREHEN METABOLIC PANEL: CPT

## 2020-06-09 PROCEDURE — 84550 ASSAY OF BLOOD/URIC ACID: CPT

## 2020-06-09 PROCEDURE — 36415 COLL VENOUS BLD VENIPUNCTURE: CPT

## 2020-06-09 PROCEDURE — 83036 HEMOGLOBIN GLYCOSYLATED A1C: CPT

## 2020-06-09 PROCEDURE — 80061 LIPID PANEL: CPT

## 2020-06-09 PROCEDURE — 85025 COMPLETE CBC W/AUTO DIFF WBC: CPT

## 2020-06-10 LAB
ALBUMIN SERPL-MCNC: 4 G/DL (ref 3.6–4.6)
ALBUMIN/GLOB SERPL: 1.4 {RATIO} (ref 1.2–2.2)
ALP SERPL-CCNC: 95 IU/L (ref 39–117)
ALT SERPL-CCNC: 11 IU/L (ref 0–32)
AST SERPL-CCNC: 17 IU/L (ref 0–40)
BASOPHILS # BLD AUTO: 0 X10E3/UL (ref 0–0.2)
BASOPHILS NFR BLD AUTO: 1 %
BILIRUB SERPL-MCNC: 0.4 MG/DL (ref 0–1.2)
BUN SERPL-MCNC: 36 MG/DL (ref 8–27)
BUN/CREAT SERPL: 21 (ref 12–28)
CALCIUM SERPL-MCNC: 9.3 MG/DL (ref 8.7–10.3)
CHLORIDE SERPL-SCNC: 105 MMOL/L (ref 96–106)
CHOLEST SERPL-MCNC: 126 MG/DL (ref 100–199)
CO2 SERPL-SCNC: 23 MMOL/L (ref 20–29)
CREAT SERPL-MCNC: 1.69 MG/DL (ref 0.57–1)
EOSINOPHIL # BLD AUTO: 0.2 X10E3/UL (ref 0–0.4)
EOSINOPHIL NFR BLD AUTO: 3 %
ERYTHROCYTE [DISTWIDTH] IN BLOOD BY AUTOMATED COUNT: 14.9 % (ref 11.7–15.4)
EST. AVERAGE GLUCOSE BLD GHB EST-MCNC: 128 MG/DL
GLOBULIN SER CALC-MCNC: 2.9 G/DL (ref 1.5–4.5)
GLUCOSE SERPL-MCNC: 77 MG/DL (ref 65–99)
HBA1C MFR BLD: 6.1 % (ref 4.8–5.6)
HCT VFR BLD AUTO: 35.5 % (ref 34–46.6)
HDLC SERPL-MCNC: 49 MG/DL
HGB BLD-MCNC: 11.8 G/DL (ref 11.1–15.9)
IMM GRANULOCYTES # BLD AUTO: 0 X10E3/UL (ref 0–0.1)
IMM GRANULOCYTES NFR BLD AUTO: 0 %
INTERPRETATION, 910389: NORMAL
INTERPRETATION: NORMAL
LDLC SERPL CALC-MCNC: 64 MG/DL (ref 0–99)
LYMPHOCYTES # BLD AUTO: 2.8 X10E3/UL (ref 0.7–3.1)
LYMPHOCYTES NFR BLD AUTO: 46 %
Lab: NORMAL
MCH RBC QN AUTO: 28.9 PG (ref 26.6–33)
MCHC RBC AUTO-ENTMCNC: 33.2 G/DL (ref 31.5–35.7)
MCV RBC AUTO: 87 FL (ref 79–97)
MONOCYTES # BLD AUTO: 0.5 X10E3/UL (ref 0.1–0.9)
MONOCYTES NFR BLD AUTO: 8 %
NEUTROPHILS # BLD AUTO: 2.5 X10E3/UL (ref 1.4–7)
NEUTROPHILS NFR BLD AUTO: 42 %
PDF IMAGE, 910387: NORMAL
PLATELET # BLD AUTO: 256 X10E3/UL (ref 150–450)
POTASSIUM SERPL-SCNC: 3.8 MMOL/L (ref 3.5–5.2)
PROT SERPL-MCNC: 6.9 G/DL (ref 6–8.5)
RBC # BLD AUTO: 4.08 X10E6/UL (ref 3.77–5.28)
SODIUM SERPL-SCNC: 141 MMOL/L (ref 134–144)
TRIGL SERPL-MCNC: 66 MG/DL (ref 0–149)
URATE SERPL-MCNC: 7.2 MG/DL (ref 2.5–7.1)
VLDLC SERPL CALC-MCNC: 13 MG/DL (ref 5–40)
WBC # BLD AUTO: 5.9 X10E3/UL (ref 3.4–10.8)

## 2020-07-10 DIAGNOSIS — I10 ESSENTIAL HYPERTENSION: ICD-10-CM

## 2020-07-13 RX ORDER — AMLODIPINE BESYLATE 5 MG/1
TABLET ORAL
Qty: 90 TAB | Refills: 3 | Status: SHIPPED | OUTPATIENT
Start: 2020-07-13 | End: 2021-05-26

## 2020-07-13 RX ORDER — CHLORTHALIDONE 25 MG/1
TABLET ORAL
Qty: 90 TAB | Refills: 3 | Status: SHIPPED | OUTPATIENT
Start: 2020-07-13 | End: 2021-05-26

## 2020-07-13 RX ORDER — ATORVASTATIN CALCIUM 20 MG/1
TABLET, FILM COATED ORAL
Qty: 90 TAB | Refills: 3 | Status: SHIPPED | OUTPATIENT
Start: 2020-07-13 | End: 2021-05-26

## 2020-07-13 RX ORDER — LOSARTAN POTASSIUM 25 MG/1
TABLET ORAL
Qty: 90 TAB | Refills: 3 | Status: SHIPPED | OUTPATIENT
Start: 2020-07-13 | End: 2021-05-26

## 2020-09-01 ENCOUNTER — HOSPITAL ENCOUNTER (EMERGENCY)
Age: 85
Discharge: HOME | End: 2020-09-01
Attending: EMERGENCY MEDICINE
Payer: MEDICARE

## 2020-09-01 ENCOUNTER — TELEPHONE (OUTPATIENT)
Dept: FAMILY MEDICINE CLINIC | Age: 85
End: 2020-09-01

## 2020-09-01 VITALS
HEART RATE: 69 BPM | DIASTOLIC BLOOD PRESSURE: 55 MMHG | OXYGEN SATURATION: 96 % | SYSTOLIC BLOOD PRESSURE: 127 MMHG | WEIGHT: 185 LBS | BODY MASS INDEX: 34.93 KG/M2 | HEIGHT: 61 IN | RESPIRATION RATE: 16 BRPM | TEMPERATURE: 98.6 F

## 2020-09-01 DIAGNOSIS — M43.6 TORTICOLLIS, ACUTE: Primary | ICD-10-CM

## 2020-09-01 PROCEDURE — 74011250637 HC RX REV CODE- 250/637: Performed by: PHYSICIAN ASSISTANT

## 2020-09-01 PROCEDURE — 99283 EMERGENCY DEPT VISIT LOW MDM: CPT

## 2020-09-01 RX ORDER — METHOCARBAMOL 500 MG/1
500 TABLET, FILM COATED ORAL
Status: COMPLETED | OUTPATIENT
Start: 2020-09-01 | End: 2020-09-01

## 2020-09-01 RX ORDER — METHOCARBAMOL 500 MG/1
500 TABLET, FILM COATED ORAL
Qty: 12 TAB | Refills: 0 | Status: SHIPPED | OUTPATIENT
Start: 2020-09-01 | End: 2020-11-09

## 2020-09-01 RX ADMIN — METHOCARBAMOL 500 MG: 500 TABLET ORAL at 13:03

## 2020-09-01 NOTE — DISCHARGE INSTRUCTIONS
Patient Education        Torticollis: Care Instructions  Your Care Instructions  Torticollis is a severe tightness of the muscles on one side of the neck. The tight muscles can make the head turn to one side, lean to one side, or be pulled forward or backward. It is also called wryneck. Your doctor asked questions about your health and examined you. You may also have had X-rays or other tests. If your doctor thinks another medical problem is causing your tight neck muscles, you may need more tests. Torticollis usually gets better with home care. Your doctor may have you take medicine to relieve pain or relax your muscles. He or she may suggest exercise and physical therapy to help increase flexibility and relieve stress. Your doctor may also have you wear a special collar, called a cervical collar, for a day or two. The collar may help make your neck more comfortable. Follow-up care is a key part of your treatment and safety. Be sure to make and go to all appointments, and call your doctor if you are having problems. It's also a good idea to know your test results and keep a list of the medicines you take. How can you care for yourself at home? · Be safe with medicines. Read and follow all instructions on the label. ? If the doctor gave you a prescription medicine for pain, take it as prescribed. ? If you are not taking a prescription pain medicine, ask your doctor if you can take an over-the-counter medicine. · Try using a heating pad on a low or medium setting for 15 to 20 minutes every 2 or 3 hours. Try a warm shower in place of one session with the heating pad. · Try using an ice pack for 10 to 15 minutes every 2 to 3 hours. Put a thin cloth between the ice and your skin. · If your doctor recommends a cervical collar, wear it exactly as directed. When should you call for help?    Call your doctor now or seek immediate medical care if:  · You have new or worse numbness in your arms, buttocks, or legs.  · You have new or worse weakness in your arms or legs. · Your neck pain gets worse. · You lose bladder or bowel control. Watch closely for changes in your health, and be sure to contact your doctor if:  · You do not get better as expected. Where can you learn more? Go to http://www.gray.com/  Enter S085 in the search box to learn more about \"Torticollis: Care Instructions. \"  Current as of: March 2, 2020               Content Version: 12.5  © 2006-2020 [employer]. Care instructions adapted under license by [employer] (which disclaims liability or warranty for this information). If you have questions about a medical condition or this instruction, always ask your healthcare professional. [address] any warranty or liability for your use of this information.

## 2020-09-01 NOTE — TELEPHONE ENCOUNTER
Pt reports head and neck pain x 3 days     She has tried some pain pills but not helping     Pls advise       Best number to reach her is [phone]

## 2020-09-01 NOTE — ED PROVIDER NOTES
EMERGENCY DEPARTMENT HISTORY AND PHYSICAL EXAM    Date: 9/1/2020  Patient Name: Lexus Padilla    History of Presenting Illness     Chief Complaint   Patient presents with    Neck Pain         History Provided By: Patient    HPI: Lexus Padilla is a 80 y.o. female with a PMH of hypercholesteremia, PVD, diabetes, hypertension and gout who presents with neck pain that started on Sunday. Patient states she just woke up with the pain. Patient denies any injury or trauma. Pain is worsened with range of motion. Patient denies any paresthesias down the arm. Patient states she has tried Tylenol and lidocaine patches with no relief. There are no alleviating factors reported. Patient rates pain 10 out of 10. PCP: Jair Contreras MD    Current Outpatient Medications   Medication Sig Dispense Refill    methocarbamoL (ROBAXIN) 500 mg tablet Take 1 Tab by mouth every eight (8) hours as needed for Muscle Spasm(s). 12 Tab 0    insulin NPH (NovoLIN N NPH U-100 Insulin) 100 unit/mL injection INJECT SUBCUTANEOUSLY  24 UNITS IN THE MORNING  AND  12 UNITS IN THE EVENING 40 mL 5    atorvastatin (LIPITOR) 20 mg tablet TAKE 1 TABLET EVERY DAY FOR CHOLESTEROL 90 Tab 3    amLODIPine (NORVASC) 5 mg tablet TAKE 1 TABLET EVERY DAY 90 Tab 3    losartan (COZAAR) 25 mg tablet TAKE 1 TABLET EVERY DAY 90 Tab 3    chlorthalidone (HYGROTEN) 25 mg tablet TAKE 1 TABLET EVERY DAY 90 Tab 3    glucose blood VI test strips (ASCENSIA AUTODISC VI, ONE TOUCH ULTRA TEST VI) strip TEST ONE TIME DAILY. 100 Strip 3    allopurinol (ZYLOPRIM) 100 mg tablet Take 2 Tabs by mouth daily. To prevent gout 180 Tab 3    aspirin 81 mg tablet Take 81 mg by mouth daily.  lidocaine 5 % topical cream Apply  to affected area two (2) times daily as needed for Pain.  15 g 3    Insulin Syringe-Needle U-100 (BD INSULIN SYRINGE ULTRA-FINE) 0.5 mL 30 gauge x 1/2\" syrg USE AS DIRECTED WITH INSULIN 100 Syringe 3       Past History     Past Medical History:  Past Medical History:   Diagnosis Date    Diabetes ([facility] [address]     Hypercholesterolemia     Hypertension     Other ill-defined conditions(799.89)     gout    Peripheral vascular disease ([facility] [address]     Refusal of blood transfusions as patient is Taoism 2/26/2013       Past Surgical History:  Past Surgical History:   Procedure Laterality Date    CABG, ARTERIAL, FOUR+  2005- Dr. Dominique Rosario  2014- demi    L popliteal artery    HX CHOLECYSTECTOMY      HX HYSTERECTOMY         Family History:  History reviewed. No pertinent family history. Social History:  Social History     Tobacco Use    Smoking status: Never Smoker    Smokeless tobacco: Never Used   Substance Use Topics    Alcohol use: No    Drug use: No       Allergies: Allergies   Allergen Reactions    Contrast Agent [Iodine] Other (comments)     Affects kidneys    Morphine Other (comments)     Pt does not know the reaction         Review of Systems   Review of Systems   Constitutional: Negative for chills and fever. Musculoskeletal: Positive for neck pain. Allergic/Immunologic: Negative for immunocompromised state. Neurological: Negative for speech difficulty, weakness, numbness and headaches. Psychiatric/Behavioral: Negative for self-injury. All other systems reviewed and are negative. Physical Exam     Vitals:    09/01/20 1208 09/01/20 1233   BP: (!) 120/95 127/55   Pulse: 69    Resp: 16    Temp: 98.6 °F (37 °C)    SpO2: 96%    Weight: 83.9 kg (185 lb)    Height: 5' 1\" (1.549 m)      Physical Exam  Vitals signs and nursing note reviewed. Constitutional:       General: She is not in acute distress. Appearance: She is well-developed. HENT:      Head: Normocephalic and atraumatic. Eyes:      Conjunctiva/sclera: Conjunctivae normal.   Neck:      Musculoskeletal: Decreased range of motion (secondary to pain).  Pain with movement, torticollis and muscular tenderness (along cervical paraspinal mm) present. No spinous process tenderness. Cardiovascular:      Rate and Rhythm: Normal rate and regular rhythm. Heart sounds: Normal heart sounds. Pulmonary:      Effort: Pulmonary effort is normal. No respiratory distress. Breath sounds: Normal breath sounds. No wheezing or rales. Skin:     General: Skin is warm and dry. Neurological:      Mental Status: She is alert and oriented to person, place, and time. Psychiatric:         Behavior: Behavior normal.         Thought Content: Thought content normal.         Judgment: Judgment normal.           Diagnostic Study Results     Labs -   No results found for this or any previous visit (from the past 12 hour(s)). Radiologic Studies -   No orders to display     CT Results  (Last 48 hours)    None        CXR Results  (Last 48 hours)    None            Medical Decision Making   I am the first provider for this patient. I reviewed the vital signs, available nursing notes, past medical history, past surgical history, family history and social history. Vital Signs-Reviewed the patient's vital signs. Records Reviewed: Old Medical Records    Disposition:  Discharged    DISCHARGE NOTE:   1:11 PM      Care plan outlined and precautions discussed. Patient has no new complaints, changes, or physical findings. All medications were reviewed with the patient; will d/c home. All of pt's questions and concerns were addressed. Patient was instructed and agrees to follow up with PCP prn, as well as to return to the ED upon further deterioration. Patient is ready to go home.     Follow-up Information     Follow up With Specialties Details Why Contact Info    Ladi Evans MD [facility] Medicine Schedule an appointment as soon as possible for a visit in 2 days As needed [address] [place] [id number] [phone]      [facility] EMERGENCY DEPT Emergency Medicine  If symptoms worsen [facility]  [phone]          Discharge Medication List as of 9/1/2020  1:11 PM      START taking these medications    Details   methocarbamoL (ROBAXIN) 500 mg tablet Take 1 Tab by mouth every eight (8) hours as needed for Muscle Spasm(s). , Normal, Disp-12 Tab,R-0         CONTINUE these medications which have NOT CHANGED    Details   insulin NPH (NovoLIN N NPH U-100 Insulin) 100 unit/mL injection INJECT SUBCUTANEOUSLY  24 UNITS IN THE MORNING  AND  12 UNITS IN THE EVENING, Normal, Disp-40 mL,R-5      atorvastatin (LIPITOR) 20 mg tablet TAKE 1 TABLET EVERY DAY FOR CHOLESTEROL, Normal, Disp-90 Tab,R-3      amLODIPine (NORVASC) 5 mg tablet TAKE 1 TABLET EVERY DAY, Normal, Disp-90 Tab,R-3      losartan (COZAAR) 25 mg tablet TAKE 1 TABLET EVERY DAY, Normal, Disp-90 Tab,R-3      chlorthalidone (HYGROTEN) 25 mg tablet TAKE 1 TABLET EVERY DAY, Normal, Disp-90 Tab,R-3      glucose blood VI test strips (ASCENSIA AUTODISC VI, ONE TOUCH ULTRA TEST VI) strip TEST ONE TIME DAILY., Normal, Disp-100 Strip, R-3      allopurinol (ZYLOPRIM) 100 mg tablet Take 2 Tabs by mouth daily. To prevent gout, Normal, Disp-180 Tab, R-3      aspirin 81 mg tablet Take 81 mg by mouth daily. , Historical Med      lidocaine 5 % topical cream Apply  to affected area two (2) times daily as needed for Pain., Print, Disp-15 g, R-3      Insulin Syringe-Needle U-100 (BD INSULIN SYRINGE ULTRA-FINE) 0.5 mL 30 gauge x 1/2\" syrg USE AS DIRECTED WITH INSULIN, Normal, Disp-100 Syringe, R-3             Provider Notes (Medical Decision Making):   Patient presents with neck pain x2 days without any injury. DDX: Torticollis, cervical strain, sprain, muscle spasm. Without any injury or trauma do not feel any imaging is warranted at this time. Patient unable to take NSAIDs secondary to kidney disease. Patient has also already tried topical lidocaine patches so we will just give muscle relaxers and patient advised to continue Tylenol as previously prescribed.       Procedures:  Procedures    Please note that this dictation was completed with Dragon, computer voice recognition software. Quite often unanticipated grammatical, syntax, homophones, and other interpretive errors are inadvertently transcribed by the computer software. Please disregard these errors. Additionally, please excuse any errors that have escaped final proofreading. Diagnosis     Clinical Impression:   1.  Torticollis, acute

## 2020-09-01 NOTE — ED NOTES
Pt presents ambulatory to ED complaining of right sided neck pain beginning Sunday. Pt reports pain worsens with movement. Pt denies numbness, tingling in arms/fingers. Pt denies injury/trauma. Pt reports taking Tylenol yesterday with no relief. Pt is alert and oriented x 4, RR even and unlabored, skin is warm and dry. Assesment completed and pt updated on plan of care. Emergency Department Nursing Plan of Care       The Nursing Plan of Care is developed from the Nursing assessment and Emergency Department Attending provider initial evaluation. The plan of care may be reviewed in the ED Provider note.     The Plan of Care was developed with the following considerations:   Patient / Family readiness to learn indicated by:verbalized understanding  Persons(s) to be included in education: patient  Barriers to Learning/Limitations:Anjana Cummings [address]    9/1/2020   12:34 PM

## 2020-09-01 NOTE — ED TRIAGE NOTES
Reports posterior neck pain radiating up into head starting this morning. Pt reports that it hurts to turn head.

## 2020-09-01 NOTE — ED NOTES
Patient  given copy of dc instructions and 1 e script(s). Patient  verbalized understanding of instructions and script (s). Patient given a current medication reconciliation form and verbalized understanding of their medications. Patient  verbalized understanding of the importance of discussing medications with  his or her physician or clinic they will be following up with. Patient alert and oriented and in no acute distress. Patient discharged home ambulatory.

## 2020-09-02 ENCOUNTER — PATIENT OUTREACH (OUTPATIENT)
Dept: CASE MANAGEMENT | Age: 85
End: 2020-09-02

## 2020-09-02 NOTE — ACP (ADVANCE CARE PLANNING)
Does patient have an Advance Directive: not on file; education provided . Patient is not interested in additional information or completing an Advance Medical Directive at this time.

## 2020-09-02 NOTE — PROGRESS NOTES
Patient contacted regarding recent discharge and COVID-19 risk. Discussed COVID-19 related testing which was not done at this time. Test results were not done. Patient informed of results, if available? N/A      Care Transition Nurse/ Ambulatory Care Manager/ LPN Care Coordinator contacted the patient by telephone to perform post discharge assessment. Verified name and  with patient as identifiers. Patient has following risk factors of: diabetes. CTN/ACM/LPN reviewed discharge instructions, medical action plan and red flags related to discharge diagnosis. Reviewed and educated them on any new and changed medications related to discharge diagnosis; Rx - robaxin. Advised obtaining a 90-day supply of all daily and as-needed medications. Advance Care Planning:   Does patient have an Advance Directive: not on file; education provided . Patient is not interested in additional information or completing an Advance Medical Directive at this time. Patient declines education regarding infection prevention, and signs and symptoms of COVID-19 and when to seek medical attention     Patient/family/caregiver given information for [employer] and agrees to enroll no  Patient's preferred e-mail:  N/A  Patient's preferred phone number: N/A  Based on Loop alert triggers, patient will be contacted by nurse care manager for worsening symptoms. Pt was advised to f/u with PCP (Dr. Contreras Freddy Provider) post-discharge. Patient declines ACM assistance today with scheduling of PCP follow-up. Plan for follow-up call in 7-14 days based on severity of symptoms and risk factors.

## 2020-09-17 ENCOUNTER — PATIENT OUTREACH (OUTPATIENT)
Dept: CASE MANAGEMENT | Age: 85
End: 2020-09-17

## 2020-09-17 NOTE — PROGRESS NOTES
Patient resolved from Transition of Care episode on 9/17/2020. Discussed COVID-19 related testing which was not done at this time. Test results were not done. Patient informed of results, if available? N/A        Patient/family has been provided the following resources and education related to COVID-19:                         Signs, symptoms and red flags related to COVID-19            [facility] exposure and quarantine guidelines            [employer] exposure contact - [phone]            Contact for their local Department of Health                 Patient currently reports that the following symptoms have improved:  no new symptoms. No further outreach scheduled with this CTN/ACM/LPN/HC/ MA. Episode of Care resolved. Patient has this CTN/ACM/LPN/HC/MA contact information if future needs arise.

## 2020-11-04 ENCOUNTER — OFFICE VISIT (OUTPATIENT)
Dept: FAMILY MEDICINE CLINIC | Age: 85
End: 2020-11-04
Payer: MEDICARE

## 2020-11-04 ENCOUNTER — TELEPHONE (OUTPATIENT)
Dept: FAMILY MEDICINE CLINIC | Age: 85
End: 2020-11-04

## 2020-11-04 VITALS
DIASTOLIC BLOOD PRESSURE: 53 MMHG | HEART RATE: 68 BPM | SYSTOLIC BLOOD PRESSURE: 107 MMHG | OXYGEN SATURATION: 92 % | HEIGHT: 61 IN | TEMPERATURE: 97.3 F | WEIGHT: 184.4 LBS | BODY MASS INDEX: 34.81 KG/M2 | RESPIRATION RATE: 18 BRPM

## 2020-11-04 DIAGNOSIS — M54.31 SCIATICA OF RIGHT SIDE: Primary | ICD-10-CM

## 2020-11-04 PROCEDURE — G8536 NO DOC ELDER MAL SCRN: HCPCS | Performed by: FAMILY MEDICINE

## 2020-11-04 PROCEDURE — G8427 DOCREV CUR MEDS BY ELIG CLIN: HCPCS | Performed by: FAMILY MEDICINE

## 2020-11-04 PROCEDURE — 1090F PRES/ABSN URINE INCON ASSESS: CPT | Performed by: FAMILY MEDICINE

## 2020-11-04 PROCEDURE — G0463 HOSPITAL OUTPT CLINIC VISIT: HCPCS | Performed by: FAMILY MEDICINE

## 2020-11-04 PROCEDURE — 99213 OFFICE O/P EST LOW 20 MIN: CPT | Performed by: FAMILY MEDICINE

## 2020-11-04 PROCEDURE — 1101F PT FALLS ASSESS-DOCD LE1/YR: CPT | Performed by: FAMILY MEDICINE

## 2020-11-04 PROCEDURE — G8510 SCR DEP NEG, NO PLAN REQD: HCPCS | Performed by: FAMILY MEDICINE

## 2020-11-04 PROCEDURE — G8417 CALC BMI ABV UP PARAM F/U: HCPCS | Performed by: FAMILY MEDICINE

## 2020-11-04 RX ORDER — METHOCARBAMOL 500 MG/1
TABLET, FILM COATED ORAL
Qty: 40 TAB | Refills: 1 | Status: SHIPPED | OUTPATIENT
Start: 2020-11-04 | End: 2020-11-09

## 2020-11-04 RX ORDER — HYDROCODONE BITARTRATE AND ACETAMINOPHEN 7.5; 325 MG/1; MG/1
1 TABLET ORAL
Qty: 90 TAB | Refills: 0 | Status: SHIPPED | OUTPATIENT
Start: 2020-11-04 | End: 2020-11-07

## 2020-11-04 NOTE — PROGRESS NOTES
HISTORY OF PRESENT ILLNESS  Mathieu Mazariegos is a 80 y.o. female. HPI Brought in by granddaughter Desmond Pierre. Has been having pain from R gluteal area to R foot, for the last 2 weeks. tyl #3 x 2- minimal relief. NO prior hx similar problem. No bowel or bladder problems. ROS    Physical Exam  Vitals signs and nursing note reviewed. Constitutional:       Appearance: She is well-developed. HENT:      Right Ear: External ear normal.      Left Ear: External ear normal.   Neck:      Thyroid: No thyromegaly. Cardiovascular:      Rate and Rhythm: Normal rate and regular rhythm. Heart sounds: Normal heart sounds. Pulmonary:      Breath sounds: Normal breath sounds. No wheezing. Abdominal:      General: Bowel sounds are normal. There is no distension. Palpations: Abdomen is soft. There is no mass. Tenderness: There is no abdominal tenderness. Musculoskeletal:      Comments: Back- tenderness paravertebral lumbar muscles. SLR positive on R   Lymphadenopathy:      Cervical: No cervical adenopathy. ASSESSMENT and PLAN  Orders Placed This Encounter    XR SPINE LUMB 2 OR 3 V    HYDROcodone-acetaminophen (NORCO) 7.5-325 mg per tablet    methocarbamoL (ROBAXIN) 500 mg tablet     Diagnoses and all orders for this visit:    1. Sciatica of right side  -     XR SPINE LUMB 2 OR 3 V; Future  -     HYDROcodone-acetaminophen (NORCO) 7.5-325 mg per tablet; Take 1 Tab by mouth every six (6) hours as needed for Pain for up to 3 days. Max Daily Amount: 4 Tabs. 1-2 tabs po every 8- 12 hours as needed for leg pain    Other orders  -     methocarbamoL (ROBAXIN) 500 mg tablet;  One tab po every 8 hours as needed for muscle relaxer

## 2020-11-04 NOTE — PROGRESS NOTES
Chief Complaint   Patient presents with    Back Pain     Pt having back pain that runs down R leg pain. 1. Have you been to the ER, urgent care clinic since your last visit? Hospitalized since your last visit? No    2. Have you seen or consulted any other health care providers outside of the [address] [place] [address] since your last visit? Include any pap smears or colon screening.  No

## 2020-11-09 ENCOUNTER — APPOINTMENT (OUTPATIENT)
Dept: VASCULAR SURGERY | Age: 85
End: 2020-11-09
Attending: NURSE PRACTITIONER
Payer: MEDICARE

## 2020-11-09 ENCOUNTER — HOSPITAL ENCOUNTER (EMERGENCY)
Age: 85
Discharge: HOME | End: 2020-11-09
Attending: EMERGENCY MEDICINE
Payer: MEDICARE

## 2020-11-09 VITALS
OXYGEN SATURATION: 100 % | TEMPERATURE: 98.5 F | HEIGHT: 60 IN | DIASTOLIC BLOOD PRESSURE: 58 MMHG | RESPIRATION RATE: 16 BRPM | WEIGHT: 184 LBS | HEART RATE: 63 BPM | BODY MASS INDEX: 36.12 KG/M2 | SYSTOLIC BLOOD PRESSURE: 119 MMHG

## 2020-11-09 DIAGNOSIS — M43.16 SPONDYLOLISTHESIS OF LUMBAR REGION: ICD-10-CM

## 2020-11-09 DIAGNOSIS — M54.31 SCIATICA OF RIGHT SIDE: Primary | ICD-10-CM

## 2020-11-09 PROCEDURE — 93971 EXTREMITY STUDY: CPT

## 2020-11-09 PROCEDURE — 99283 EMERGENCY DEPT VISIT LOW MDM: CPT

## 2020-11-09 RX ORDER — METHOCARBAMOL 500 MG/1
TABLET, FILM COATED ORAL
COMMUNITY
End: 2020-12-11 | Stop reason: ALTCHOICE

## 2020-11-09 RX ORDER — HYDROCODONE BITARTRATE AND ACETAMINOPHEN 7.5; 325 MG/1; MG/1
1 TABLET ORAL
COMMUNITY
End: 2020-12-11 | Stop reason: ALTCHOICE

## 2020-11-09 NOTE — ED PROVIDER NOTES
EMERGENCY DEPARTMENT HISTORY AND PHYSICAL EXAM    Date: 11/9/2020  Patient Name: Leigh Duncan    History of Presenting Illness     Chief Complaint   Patient presents with    Leg Pain         History Provided By: Patient    HPI: Leigh Duncan is a 80 y.o. female with a PMH of Hypertension, diabetes, PVD, hypercholesterolemia who presents with leg pain. Onset 1 week ago. Located to right leg. States it starts in her right hip and radiates to her foot. Pain is intermittent. States she has not been walking more than usual and denies any injury to her leg. Denies back pain, leg weakness, numbness or tingling. Patient states pain is worse behind her right knee. Patient at baseline ambulates with a cane. Denies recent surgeries, immobilization, cancer history, plane or car ride. Patient states she does have history of DVT in the past.    Of note, patient was seen by her PCP 5 days ago for symptoms. Was diagnosed with static and prescribed Norco and Robaxin. Patient states symptoms have not improved after medication use. Patient is present with her daughter. PCP: Latasha Nelson MD    Current Outpatient Medications   Medication Sig Dispense Refill    HYDROcodone-acetaminophen (Norco) 7.5-325 mg per tablet Take 1 Tab by mouth every six (6) hours as needed for Pain.  methocarbamoL (Robaxin) 500 mg tablet Take  by mouth three (3) times daily as needed for Muscle Spasm(s).       insulin NPH (NovoLIN N NPH U-100 Insulin) 100 unit/mL injection INJECT SUBCUTANEOUSLY  24 UNITS IN THE MORNING  AND  12 UNITS IN THE EVENING 40 mL 5    atorvastatin (LIPITOR) 20 mg tablet TAKE 1 TABLET EVERY DAY FOR CHOLESTEROL 90 Tab 3    amLODIPine (NORVASC) 5 mg tablet TAKE 1 TABLET EVERY DAY 90 Tab 3    losartan (COZAAR) 25 mg tablet TAKE 1 TABLET EVERY DAY 90 Tab 3    chlorthalidone (HYGROTEN) 25 mg tablet TAKE 1 TABLET EVERY DAY 90 Tab 3    glucose blood VI test strips (ASCENSIA AUTODISC VI, ONE TOUCH ULTRA TEST VI) strip TEST ONE TIME DAILY. 100 Strip 3    lidocaine 5 % topical cream Apply  to affected area two (2) times daily as needed for Pain. 15 g 3    Insulin Syringe-Needle U-100 (BD INSULIN SYRINGE ULTRA-FINE) 0.5 mL 30 gauge x 1/2\" syrg USE AS DIRECTED WITH INSULIN 100 Syringe 3    allopurinol (ZYLOPRIM) 100 mg tablet Take 2 Tabs by mouth daily. To prevent gout 180 Tab 3    aspirin 81 mg tablet Take 81 mg by mouth daily. Past History     Past Medical History:  Past Medical History:   Diagnosis Date    Diabetes ([address]     Hypercholesterolemia     Hypertension     Other ill-defined conditions(799.89)     gout    Peripheral vascular disease ([facility] [address]     Refusal of blood transfusions as patient is Hinduism 2/26/2013       Past Surgical History:  Past Surgical History:   Procedure Laterality Date    CABG, ARTERIAL, FOUR+  2005- Dr. Del Sr  2014- demi    L popliteal artery    HX CHOLECYSTECTOMY      HX HYSTERECTOMY         Family History:  History reviewed. No pertinent family history. Social History:  Social History     Tobacco Use    Smoking status: Never Smoker    Smokeless tobacco: Never Used   Substance Use Topics    Alcohol use: No    Drug use: No       Allergies: Allergies   Allergen Reactions    Contrast Agent [Iodine] Other (comments)     Affects kidneys    Morphine Other (comments)     Pt does not know the reaction         Review of Systems   Review of Systems   Constitutional: Negative for chills and fever. Respiratory: Negative for shortness of breath. Cardiovascular: Negative for chest pain and leg swelling. Genitourinary: Negative for dysuria, hematuria and urgency. Musculoskeletal: Positive for arthralgias. Negative for back pain, gait problem and joint swelling. Skin: Negative for color change, pallor and wound. Neurological: Negative for weakness and numbness. All other systems reviewed and are negative.       Physical Exam Vitals:    11/09/20 0943   BP: 124/85   Pulse: 68   Resp: 16   Temp: 98.5 °F (36.9 °C)   SpO2: 97%   Weight: 83.5 kg (184 lb)   Height: 5' (1.524 m)     Physical Exam  Vitals signs and nursing note reviewed. Constitutional:       General: She is not in acute distress. Appearance: She is well-developed. HENT:      Head: Normocephalic and atraumatic. Neck:      Musculoskeletal: Normal range of motion and neck supple. Cardiovascular:      Rate and Rhythm: Normal rate and regular rhythm. Heart sounds: Normal heart sounds. Pulmonary:      Effort: Pulmonary effort is normal.      Breath sounds: Normal breath sounds. Abdominal:      Palpations: Abdomen is soft. Musculoskeletal:      Right hip: Normal.      Right knee: Normal.      Lumbar back: She exhibits spasm. She exhibits normal range of motion, no tenderness and no deformity. Right upper leg: Normal.      Right lower leg: She exhibits tenderness. She exhibits no swelling and no deformity. Comments: NVI  Pedal pulses 2+ bilaterally  Neg SLR  Right calf tenderness with dorsiflexion of foot     Skin:     General: Skin is warm and dry. Neurological:      Mental Status: She is alert and oriented to person, place, and time. Diagnostic Study Results     Labs -   No results found for this or any previous visit (from the past 12 hour(s)). Radiologic Studies -   No orders to display     CT Results  (Last 48 hours)    None        CXR Results  (Last 48 hours)    None            Medical Decision Making   I am the first provider for this patient. I reviewed the vital signs, available nursing notes, past medical history, past surgical history, family history and social history. Vital Signs-Reviewed the patient's vital signs.     Records Reviewed: Nursing Notes, Old Medical Records and Previous Radiology Studies     80-year-old female with complaint of leg pain exhibiting unremarkable hip exam.  Palpable spasm noted in the right lumbar paraspinous region but otherwise no palpable spinal tenderness. Patient exhibits calf tenderness upon dorsiflexion which is concerning with patient history of DVT. Will obtain venous Doppler to rule out. ED Course as of Nov 09 1146   Mon Nov 09, 2020   1126 Informed by ultrasound tech that preliminary results for Doppler study is unremarkable for DVT. [NA]   1152 discussed results with patient as well as her daughter. Herminia Lowe is unremarkable DVT study but imaging studies reviewed and likely this is a sciatica due to spinal changes. Advised that she is limited in medication choices secondary to diabetes and kidney disease. Offered alternative muscle relaxant however patient and her daughter would prefer to follow-up with Dr. Juanjose Null regarding medication management will keep the same medicines. Recommend follow-up Ortho to obtain spinal injections for pain relief. ED Course User Index  [NA] Veronica Bajwa NP          Disposition:  Discharge   DISCHARGE NOTE:   11:51 AM        Care plan outlined and precautions discussed. Patient has no new complaints, changes, or physical findings. All of pt's questions and concerns were addressed. Patient was instructed and agrees to follow up with ortho and PCP, as well as to return to the ED upon further deterioration. Patient is ready to go home. Follow-up Information     Follow up With Specialties Details Why Contact Info    Jyothi Sánchez MD Family Medicine In 1 week If symptoms worsen [address]  [phone]      Erik Lubin MD Orthopedic Surgery Schedule an appointment as soon as possible for a visit  [address]  [phone]            Current Discharge Medication List      CONTINUE these medications which have NOT CHANGED    Details   HYDROcodone-acetaminophen (Norco) 7.5-325 mg per tablet Take 1 Tab by mouth every six (6) hours as needed for Pain.       methocarbamoL (Robaxin) 500 mg tablet Take  by mouth three (3) times daily as needed for Muscle Spasm(s). insulin NPH (NovoLIN N NPH U-100 Insulin) 100 unit/mL injection INJECT SUBCUTANEOUSLY  24 UNITS IN THE MORNING  AND  12 UNITS IN THE EVENING  Qty: 40 mL, Refills: 5      atorvastatin (LIPITOR) 20 mg tablet TAKE 1 TABLET EVERY DAY FOR CHOLESTEROL  Qty: 90 Tab, Refills: 3      amLODIPine (NORVASC) 5 mg tablet TAKE 1 TABLET EVERY DAY  Qty: 90 Tab, Refills: 3      losartan (COZAAR) 25 mg tablet TAKE 1 TABLET EVERY DAY  Qty: 90 Tab, Refills: 3      chlorthalidone (HYGROTEN) 25 mg tablet TAKE 1 TABLET EVERY DAY  Qty: 90 Tab, Refills: 3    Associated Diagnoses: Essential hypertension      glucose blood VI test strips (ASCENSIA AUTODISC VI, ONE TOUCH ULTRA TEST VI) strip TEST ONE TIME DAILY. Qty: 100 Strip, Refills: 3      lidocaine 5 % topical cream Apply  to affected area two (2) times daily as needed for Pain. Qty: 15 g, Refills: 3      Insulin Syringe-Needle U-100 (BD INSULIN SYRINGE ULTRA-FINE) 0.5 mL 30 gauge x 1/2\" syrg USE AS DIRECTED WITH INSULIN  Qty: 100 Syringe, Refills: 3      allopurinol (ZYLOPRIM) 100 mg tablet Take 2 Tabs by mouth daily. To prevent gout  Qty: 180 Tab, Refills: 3      aspirin 81 mg tablet Take 81 mg by mouth daily. Provider Notes (Medical Decision Making):   DDX: Arthritis, spinal stenosis, Sciatica, DVT, PAD, PVD  Procedures:  Procedures    Please note that this dictation was completed with Dragon, computer voice recognition software. Quite often unanticipated grammatical, syntax, homophones, and other interpretive errors are inadvertently transcribed by the computer software. Please disregard these errors. Additionally, please excuse any errors that have escaped final proofreading. Diagnosis     Clinical Impression:   1. Sciatica of right side    2.  Spondylolisthesis of lumbar region

## 2020-11-09 NOTE — DISCHARGE INSTRUCTIONS
Patient Education        Spondylolysis and Spondylolisthesis: Exercises  Introduction  Here are some examples of exercises for you to try. The exercises may be suggested for a condition or for rehabilitation. Start each exercise slowly. Ease off the exercises if you start to have pain. You will be told when to start these exercises and which ones will work best for you. How to do the exercises  Single knee-to-chest   1. Lie on your back with your knees bent and your feet flat on the floor. You can put a small pillow under your head and neck if it is more comfortable. 2. Bring one knee to your chest, keeping the other foot flat on the floor. 3. Keep your lower back pressed to the floor. Hold for 15 to 30 seconds. 4. Relax, and lower the knee to the starting position. 5. Repeat with the other leg. Repeat 2 to 4 times with each leg. 6. To get more stretch, put your other leg flat on the floor while pulling your knee to your chest.    Double knee-to-chest   1. Lie on your back with your knees bent and your feet flat on the floor. You can put a small pillow under your head and neck if it is more comfortable. 2. Bring both knees to your chest.  3. Keep your lower back pressed to the floor. Hold for 15 to 30 seconds. 4. Relax, and lower your knees to the starting position. 5. Repeat 2 to 4 times. Alternate arm and leg (bird dog) exercise   Do this exercise slowly. Try to keep your body straight at all times. 1. Start on the floor, on your hands and knees. 2. Tighten your belly muscles by pulling your belly button in toward your spine. Be sure you continue to breathe normally and do not hold your breath. 3. Raise one arm off the floor, and hold it straight out in front of you. Be careful not to let your shoulder drop down, because that will twist your trunk. 4. Hold for about 6 seconds, then lower your arm and switch to your other arm. 5. Repeat 8 to 12 times on each arm.   6. When you can do this exercise with ease and no pain, repeat steps 1 through 5. But this time do it with one leg raised off the floor, holding your leg straight out behind you. Be careful not to let your hip drop down, because that will twist your trunk. 7. When holding your leg straight out becomes easier, try raising your opposite arm at the same time, and repeat steps 1 through 5. Bridging   1. Lie on your back with both knees bent. Your knees should be bent about 90 degrees. 2. Then push your feet into the floor, squeeze your buttocks, and lift your hips off the floor until your shoulders, hips, and knees are all in a straight line. 3. Hold for about 6 seconds as you continue to breathe normally, and then slowly lower your hips back down to the floor and rest for up to 10 seconds. 4. Repeat 8 to 12 times. Curl-ups   1. Lie on the floor on your back with your knees bent at a 90-degree angle. Your feet should be flat on the floor, about 12 inches from your buttocks. 2. Cross your arms over your chest. If this bothers your neck, try putting your hands behind your neck (not your head), with your elbows spread apart. 3. Slowly tighten your belly muscles and raise your shoulder blades off the floor. 4. Keep your head in line with your body, and do not press your chin to your chest.  5. Hold this position for 1 or 2 seconds, then slowly lower yourself back down to the floor. 6. Repeat 8 to 12 times. Plank   Do this exercise slowly. Try to keep your body straight at all times, and do not let one hip drop lower than the other. 1. Lie on your stomach, resting your upper body on your forearms. 2. Tighten your belly muscles by pulling your belly button in toward your spine. 3. Keeping your knees on the floor, press down with your forearms to lift your upper body off the floor. 4. Hold for about 6 seconds, then lower your body to the floor. Rest for up to 10 seconds. 5. Repeat 8 to 12 times.   6. Over time, work up to holding for 15 to 30 seconds each time. 7. If this exercise is easy to do with your knees on the floor, try doing this exercise with your knees and legs straight, supported by your toes on the floor. Follow-up care is a key part of your treatment and safety. Be sure to make and go to all appointments, and call your doctor if you are having problems. It's also a good idea to know your test results and keep a list of the medicines you take. Where can you learn more? Go to http://www.barr.com/  Enter M245 in the search box to learn more about \"Spondylolysis and Spondylolisthesis: Exercises. \"  Current as of: March 2, 2020               Content Version: 12.6  © 2006-2020 [employer]. Care instructions adapted under license by [employer] (which disclaims liability or warranty for this information). If you have questions about a medical condition or this instruction, always ask your healthcare professional. [place] [address] any warranty or liability for your use of this information. Patient Education        Sciatica: Care Instructions  Your Care Instructions     Sciatica (say \"one-HY-kx-kuh\") is an irritation of one of the sciatic nerves, which come from the spinal cord in the lower back. The sciatic nerves and their branches extend down through the buttock to the foot. Sciatica can develop when an injured disc in the back irritates or presses against a spinal nerve root. Its main symptom is pain, numbness, or weakness that is often worse in the leg or foot than in the back. Sciatica often will improve and go away with time. Early treatment usually includes medicines and exercises to relieve pain. Follow-up care is a key part of your treatment and safety. Be sure to make and go to all appointments, and call your doctor if you are having problems. It's also a good idea to know your test results and keep a list of the medicines you take.   How can you care for yourself at home? · Take pain medicines exactly as directed. ? If the doctor gave you a prescription medicine for pain, take it as prescribed. ? If you are not taking a prescription pain medicine, ask your doctor if you can take an over-the-counter medicine. · Use heat or ice to relieve pain. ? To apply heat, put a warm water bottle, heating pad set on low, or warm cloth on your back. Do not go to sleep with a heating pad on your skin. ? To use ice, put ice or a cold pack on the area for 10 to 20 minutes at a time. Put a thin cloth between the ice and your skin. · Avoid sitting if possible, unless it feels better than standing. · Alternate lying down with short walks. Increase your walking distance as you are able to without making your symptoms worse. · Do not do anything that makes your symptoms worse. When should you call for help? Call 911 anytime you think you may need emergency care. For example, call if:    · You are unable to move a leg at all. Call your doctor now or seek immediate medical care if:    · You have new or worse symptoms in your legs or buttocks. Symptoms may include:  ? Numbness or tingling. ? Weakness. ? Pain.     · You lose bladder or bowel control. Watch closely for changes in your health, and be sure to contact your doctor if:    · You are not getting better as expected. Where can you learn more? Go to http://www.gray.com/  Enter Z239 in the search box to learn more about \"Sciatica: Care Instructions. \"  Current as of: March 2, 2020               Content Version: 12.6  © 3066-1424 "[employer]. Care instructions adapted under license by [employer] (which disclaims liability or warranty for this information). If you have questions about a medical condition or this instruction, always ask your healthcare professional. [address] any warranty or liability for your use of this information.

## 2020-11-10 ENCOUNTER — PATIENT OUTREACH (OUTPATIENT)
Dept: CASE MANAGEMENT | Age: 85
End: 2020-11-10

## 2020-11-10 NOTE — PROGRESS NOTES
Patient contacted regarding recent discharge and COVID-19 risk. Discussed COVID-19 related testing which was not done at this time. Test results were not done. Patient informed of results, if available? no    Care Transition Nurse/ Ambulatory Care Manager/ LPN Care Coordinator contacted the patient by telephone to perform post discharge assessment. Verified name and  with patient as identifiers. Patient has following risk factors of: diabetes and chronic kidney disease. CTN/ACM/LPN reviewed discharge instructions, medical action plan and red flags related to discharge diagnosis. Reviewed and educated them on any new and changed medications related to discharge diagnosis. Advised obtaining a 90-day supply of all daily and as-needed medications. Advance Care Planning:   Does patient have an Advance Directive: yes; reviewed and current     Education provided regarding infection prevention, and signs and symptoms of COVID-19 and when to seek medical attention with patient who verbalized understanding. Discussed exposure protocols and quarantine from [address] you at higher risk for severe illness  and given an opportunity for questions and concerns. The patient agrees to contact the COVID-19 hotline [phone] or PCP office for questions related to their healthcare. CTN/ACM/LPN provided contact information for future reference. From CDC: Are you at higher risk for severe illness?  Wash your hands often.  Avoid close contact (6 feet, which is about two arm lengths) with people who are sick.  Put distance between yourself and other people if COVID-19 is spreading in your community.  Clean and disinfect frequently touched surfaces.  Avoid all cruise travel and non-essential air travel.  Call your healthcare professional if you have concerns about COVID-19 and your underlying condition or if you are sick.     For more information on steps you can take to protect yourself, see CDC's How to Protect Yourself      Patient/family/caregiver given information for [employer] and agrees to enroll no      Plan for follow-up call in 7-14 days based on severity of symptoms and risk factors. Patient reports still having leg pain. She lives alone but has still been able to safely ambulate with her walker despite her pain and attend to her ADL's. She is using PRN pain med rx'd by PCP but not finding much relief. Patients next PCP appt not until 12/11 . Recommended she schedule a sooner ER follow up appt and also an ortho appt for further management of pain. Patient reports her daughter is working today but will call to schedule her PCP / ortho followups when she is available to take her. No further questions    Ambulatory Care Management Note    Date/Time:  11/10/2020 9:50 AM    This patient was received as a referral from Daily Assignment   Ambulatory Care Manager outreached to patient today to offer care management services. Introduction to self and role of care manager provided. Patient accepted care management services at this time. Follow up call scheduled at this time. Patient has Ambulatory Care Manager's contact number for for any questions or concerns.

## 2020-12-02 ENCOUNTER — PATIENT OUTREACH (OUTPATIENT)
Dept: CASE MANAGEMENT | Age: 85
End: 2020-12-02

## 2020-12-02 NOTE — PROGRESS NOTES
ACM contacted patient for CM services. Patient was guarded and short with this writer initially. Once ACM explained CM and care coordination patient did agree to CM and calls back but reported she didn't feel like standing long today and talking on the phone. She would like this ACM to call back to talk further in the future. Patient did note that she saw the orthopedic doctor for her sciatic pain and was provided medication that helped her. She is not currently in pain. She does live alone and ambulates using a walker and is independent in ADL's and has not had any falls. She does give her own insulin but does not often check her BS as they have been normal- the last she checked BS was 80. Her last A1C was 6.1 . She notes she is seeing her PCP on 12/11 Dr Tr Sainz and plans to ask if she needs to continue on insulin. She did ask if the ACM would try to find her a dentist who accepts Medicare or has affordable dentures as she has had her set for over 10 years and needs a new set. ACM will look into resources for her. ACM will plan to gather further information and complete assessments with patient to determine any needs on the next call in 2 weeks or so. Patient is agreeable to this plan. 3:35 pm  Unfortunately Medicare does not cover Dentures at all and patient notes not having any secondary dental coverage. ACM contacted [facility] in [place]. The cost of a new set out of pocket starts at $379 for a full set . She would go in for a free visit/ consultation and then the amount for the dentures is due at the time of service. If patient is interested she would call [phone] to schedule.  Will provide this information on next call

## 2020-12-11 ENCOUNTER — OFFICE VISIT (OUTPATIENT)
Dept: FAMILY MEDICINE CLINIC | Age: 85
End: 2020-12-11
Payer: MEDICARE

## 2020-12-11 VITALS
SYSTOLIC BLOOD PRESSURE: 110 MMHG | HEIGHT: 60 IN | DIASTOLIC BLOOD PRESSURE: 60 MMHG | OXYGEN SATURATION: 95 % | BODY MASS INDEX: 36.32 KG/M2 | TEMPERATURE: 98.4 F | WEIGHT: 185 LBS | RESPIRATION RATE: 16 BRPM | HEART RATE: 60 BPM

## 2020-12-11 DIAGNOSIS — Z00.00 ENCOUNTER FOR MEDICARE ANNUAL WELLNESS EXAM: Primary | ICD-10-CM

## 2020-12-11 DIAGNOSIS — N18.30 CONTROLLED TYPE 2 DIABETES MELLITUS WITH STAGE 3 CHRONIC KIDNEY DISEASE, WITH LONG-TERM CURRENT USE OF INSULIN (HCC): ICD-10-CM

## 2020-12-11 DIAGNOSIS — E11.22 CONTROLLED TYPE 2 DIABETES MELLITUS WITH STAGE 3 CHRONIC KIDNEY DISEASE, WITH LONG-TERM CURRENT USE OF INSULIN (HCC): ICD-10-CM

## 2020-12-11 DIAGNOSIS — M10.00 IDIOPATHIC GOUT, UNSPECIFIED CHRONICITY, UNSPECIFIED SITE: ICD-10-CM

## 2020-12-11 DIAGNOSIS — E78.00 HYPERCHOLESTEROLEMIA: ICD-10-CM

## 2020-12-11 DIAGNOSIS — I10 ESSENTIAL HYPERTENSION: ICD-10-CM

## 2020-12-11 DIAGNOSIS — Z79.4 CONTROLLED TYPE 2 DIABETES MELLITUS WITH STAGE 3 CHRONIC KIDNEY DISEASE, WITH LONG-TERM CURRENT USE OF INSULIN (HCC): ICD-10-CM

## 2020-12-11 DIAGNOSIS — E66.01 SEVERE OBESITY (HCC): ICD-10-CM

## 2020-12-11 PROCEDURE — G8510 SCR DEP NEG, NO PLAN REQD: HCPCS | Performed by: FAMILY MEDICINE

## 2020-12-11 PROCEDURE — G0439 PPPS, SUBSEQ VISIT: HCPCS | Performed by: FAMILY MEDICINE

## 2020-12-11 PROCEDURE — 99213 OFFICE O/P EST LOW 20 MIN: CPT | Performed by: FAMILY MEDICINE

## 2020-12-11 PROCEDURE — G8417 CALC BMI ABV UP PARAM F/U: HCPCS | Performed by: FAMILY MEDICINE

## 2020-12-11 PROCEDURE — 1101F PT FALLS ASSESS-DOCD LE1/YR: CPT | Performed by: FAMILY MEDICINE

## 2020-12-11 PROCEDURE — G8536 NO DOC ELDER MAL SCRN: HCPCS | Performed by: FAMILY MEDICINE

## 2020-12-11 PROCEDURE — G8427 DOCREV CUR MEDS BY ELIG CLIN: HCPCS | Performed by: FAMILY MEDICINE

## 2020-12-11 PROCEDURE — 1090F PRES/ABSN URINE INCON ASSESS: CPT | Performed by: FAMILY MEDICINE

## 2020-12-11 PROCEDURE — G0463 HOSPITAL OUTPT CLINIC VISIT: HCPCS | Performed by: FAMILY MEDICINE

## 2020-12-11 RX ORDER — ACETAMINOPHEN AND CODEINE PHOSPHATE 300; 30 MG/1; MG/1
TABLET ORAL
COMMUNITY
End: 2021-03-18 | Stop reason: SDUPTHER

## 2020-12-11 NOTE — PROGRESS NOTES
Chief Complaint   Patient presents with    Annual Wellness Visit    Follow Up Chronic Condition     1. Have you been to the ER, urgent care clinic since your last visit? Hospitalized since your last visit? No    2. Have you seen or consulted any other health care providers outside of the [address] [place] [address] since your last visit? Include any pap smears or colon screening.  No

## 2020-12-11 NOTE — PROGRESS NOTES
HISTORY OF PRESENT ILLNESS  Checo Arzola is a 80 y.o. female. HPI Pt. Comes in for blood pressure, cholesterol, and diabetes check. Has been doing ok. Leg pain is better. Occasional mild abdominal pain. Some constipation at times, stomach pain improves after bm. No blood in stools. No melena. NO weight loss. Sees Dr. Sandeep Canas- orthopedics, dr. Debora Moelelr  (renal). Declines immunizations. Would want daughter Cris Elaine to be her mPOA. Has an advanced directive. Review of Systems   HENT: Negative for hearing loss. Neurological:        No falls occ uses a cane. Independent in all adls. Still does own finances and housework. Memory ok. Psychiatric/Behavioral: Negative for depression. No alcohol. Physical Exam  Vitals signs and nursing note reviewed. Constitutional:       Appearance: She is well-developed. HENT:      Right Ear: External ear normal.      Left Ear: External ear normal.   Neck:      Thyroid: No thyromegaly. Cardiovascular:      Rate and Rhythm: Normal rate and regular rhythm. Heart sounds: Normal heart sounds. Pulmonary:      Breath sounds: Normal breath sounds. No wheezing. Comments: BR- no masses  Abdominal:      General: Bowel sounds are normal. There is no distension. Palpations: Abdomen is soft. There is no mass. Tenderness: There is no abdominal tenderness. Lymphadenopathy:      Cervical: No cervical adenopathy. ASSESSMENT and PLAN  Orders Placed This Encounter    METABOLIC PANEL, COMPREHENSIVE    LIPID PANEL    CBC WITH AUTOMATED DIFF    HEMOGLOBIN A1C WITH EAG    URIC ACID     Diagnoses and all orders for this visit:    1. Encounter for Medicare annual wellness exam    2. Severe obesity ([address]    3. Essential hypertension  -     METABOLIC PANEL, COMPREHENSIVE; Future  -     CBC WITH AUTOMATED DIFF; Future    4. Hypercholesterolemia  -     LIPID PANEL; Future    5.  Controlled type 2 diabetes mellitus with stage 3 chronic kidney disease, with long-term current use of insulin ([facility]  -     HEMOGLOBIN A1C WITH EAG; Future    6. Idiopathic gout, unspecified chronicity, unspecified site  -     URIC ACID; Future      Follow-up and Dispositions    · Return in about 6 months (around 6/11/2021).

## 2020-12-17 ENCOUNTER — HOSPITAL ENCOUNTER (OUTPATIENT)
Dept: LAB | Age: 85
Discharge: HOME | End: 2020-12-17
Payer: MEDICARE

## 2020-12-17 PROCEDURE — 36415 COLL VENOUS BLD VENIPUNCTURE: CPT

## 2020-12-17 PROCEDURE — 80061 LIPID PANEL: CPT

## 2020-12-17 PROCEDURE — 84550 ASSAY OF BLOOD/URIC ACID: CPT

## 2020-12-17 PROCEDURE — 83036 HEMOGLOBIN GLYCOSYLATED A1C: CPT

## 2020-12-17 PROCEDURE — 80053 COMPREHEN METABOLIC PANEL: CPT

## 2020-12-17 PROCEDURE — 85025 COMPLETE CBC W/AUTO DIFF WBC: CPT

## 2020-12-18 LAB
ALBUMIN SERPL-MCNC: 4.1 G/DL (ref 3.6–4.6)
ALBUMIN/GLOB SERPL: 1.4 {RATIO} (ref 1.2–2.2)
ALP SERPL-CCNC: 98 IU/L (ref 39–117)
ALT SERPL-CCNC: 12 IU/L (ref 0–32)
AST SERPL-CCNC: 17 IU/L (ref 0–40)
BASOPHILS # BLD AUTO: 0 X10E3/UL (ref 0–0.2)
BASOPHILS NFR BLD AUTO: 1 %
BILIRUB SERPL-MCNC: 0.5 MG/DL (ref 0–1.2)
BUN SERPL-MCNC: 35 MG/DL (ref 8–27)
BUN/CREAT SERPL: 22 (ref 12–28)
CALCIUM SERPL-MCNC: 9.8 MG/DL (ref 8.7–10.3)
CHLORIDE SERPL-SCNC: 102 MMOL/L (ref 96–106)
CHOLEST SERPL-MCNC: 131 MG/DL (ref 100–199)
CO2 SERPL-SCNC: 24 MMOL/L (ref 20–29)
CREAT SERPL-MCNC: 1.59 MG/DL (ref 0.57–1)
EOSINOPHIL # BLD AUTO: 0.1 X10E3/UL (ref 0–0.4)
EOSINOPHIL NFR BLD AUTO: 2 %
ERYTHROCYTE [DISTWIDTH] IN BLOOD BY AUTOMATED COUNT: 15.4 % (ref 11.7–15.4)
EST. AVERAGE GLUCOSE BLD GHB EST-MCNC: 131 MG/DL
GLOBULIN SER CALC-MCNC: 2.9 G/DL (ref 1.5–4.5)
GLUCOSE SERPL-MCNC: 90 MG/DL (ref 65–99)
HBA1C MFR BLD: 6.2 % (ref 4.8–5.6)
HCT VFR BLD AUTO: 38 % (ref 34–46.6)
HDLC SERPL-MCNC: 48 MG/DL
HGB BLD-MCNC: 12.4 G/DL (ref 11.1–15.9)
IMM GRANULOCYTES # BLD AUTO: 0 X10E3/UL (ref 0–0.1)
IMM GRANULOCYTES NFR BLD AUTO: 0 %
INTERPRETATION, 910389: NORMAL
INTERPRETATION: NORMAL
LDLC SERPL CALC-MCNC: 66 MG/DL (ref 0–99)
LYMPHOCYTES # BLD AUTO: 3.2 X10E3/UL (ref 0.7–3.1)
LYMPHOCYTES NFR BLD AUTO: 52 %
Lab: NORMAL
MCH RBC QN AUTO: 28.7 PG (ref 26.6–33)
MCHC RBC AUTO-ENTMCNC: 32.6 G/DL (ref 31.5–35.7)
MCV RBC AUTO: 88 FL (ref 79–97)
MONOCYTES # BLD AUTO: 0.6 X10E3/UL (ref 0.1–0.9)
MONOCYTES NFR BLD AUTO: 9 %
NEUTROPHILS # BLD AUTO: 2.2 X10E3/UL (ref 1.4–7)
NEUTROPHILS NFR BLD AUTO: 36 %
PDF IMAGE, 910387: NORMAL
PLATELET # BLD AUTO: 281 X10E3/UL (ref 150–450)
POTASSIUM SERPL-SCNC: 3.8 MMOL/L (ref 3.5–5.2)
PROT SERPL-MCNC: 7 G/DL (ref 6–8.5)
RBC # BLD AUTO: 4.32 X10E6/UL (ref 3.77–5.28)
SODIUM SERPL-SCNC: 140 MMOL/L (ref 134–144)
TRIGL SERPL-MCNC: 88 MG/DL (ref 0–149)
URATE SERPL-MCNC: 6.1 MG/DL (ref 3.1–7.9)
VLDLC SERPL CALC-MCNC: 17 MG/DL (ref 5–40)
WBC # BLD AUTO: 6.1 X10E3/UL (ref 3.4–10.8)

## 2020-12-29 ENCOUNTER — PATIENT OUTREACH (OUTPATIENT)
Dept: CASE MANAGEMENT | Age: 85
End: 2020-12-29

## 2020-12-29 NOTE — PROGRESS NOTES
ACM contacted patient for CM follow up. Patient again very guarded and states she is busy and asks \"what do you want with me\". ACM offered to provide resource for affordable dentures as patient had requested on our last call and patient states \"oh thanks but that's alright I dont need anything  I am busy right now\" and disconnected the call. ACM will now close this CM episode as patient disengaged.

## 2021-01-04 RX ORDER — SYRGE-NDL,INS 0.5 ML HALF MARK 30 G X1/2"
SYRINGE, EMPTY DISPOSABLE MISCELLANEOUS
Qty: 90 SYRINGE | Refills: 5 | Status: SHIPPED | OUTPATIENT
Start: 2021-01-04

## 2021-03-17 ENCOUNTER — TELEPHONE (OUTPATIENT)
Dept: FAMILY MEDICINE CLINIC | Age: 86
End: 2021-03-17

## 2021-03-24 ENCOUNTER — IMMUNIZATION (OUTPATIENT)
Dept: INTERNAL MEDICINE CLINIC | Age: 86
End: 2021-03-24
Payer: MEDICARE

## 2021-03-24 DIAGNOSIS — Z23 ENCOUNTER FOR IMMUNIZATION: Primary | ICD-10-CM

## 2021-03-24 PROCEDURE — 0001A COVID-19, MRNA, LNP-S, PF, 30MCG/0.3ML DOSE(PFIZER): CPT | Performed by: FAMILY MEDICINE

## 2021-03-24 PROCEDURE — 91300 COVID-19, MRNA, LNP-S, PF, 30MCG/0.3ML DOSE(PFIZER): CPT | Performed by: FAMILY MEDICINE

## 2021-04-14 ENCOUNTER — IMMUNIZATION (OUTPATIENT)
Dept: INTERNAL MEDICINE CLINIC | Age: 86
End: 2021-04-14
Payer: MEDICARE

## 2021-04-14 DIAGNOSIS — Z23 ENCOUNTER FOR IMMUNIZATION: Primary | ICD-10-CM

## 2021-04-14 PROCEDURE — 0002A COVID-19, MRNA, LNP-S, PF, 30MCG/0.3ML DOSE(PFIZER): CPT | Performed by: FAMILY MEDICINE

## 2021-04-14 PROCEDURE — 91300 COVID-19, MRNA, LNP-S, PF, 30MCG/0.3ML DOSE(PFIZER): CPT | Performed by: FAMILY MEDICINE

## 2021-05-26 DIAGNOSIS — I10 ESSENTIAL HYPERTENSION: ICD-10-CM

## 2021-05-26 RX ORDER — AMLODIPINE BESYLATE 5 MG/1
TABLET ORAL
Qty: 90 TABLET | Refills: 3 | Status: SHIPPED | OUTPATIENT
Start: 2021-05-26 | End: 2022-05-30

## 2021-05-26 RX ORDER — ATORVASTATIN CALCIUM 20 MG/1
TABLET, FILM COATED ORAL
Qty: 90 TABLET | Refills: 3 | Status: SHIPPED | OUTPATIENT
Start: 2021-05-26 | End: 2022-05-30

## 2021-05-26 RX ORDER — LOSARTAN POTASSIUM 25 MG/1
TABLET ORAL
Qty: 90 TABLET | Refills: 3 | Status: SHIPPED | OUTPATIENT
Start: 2021-05-26 | End: 2022-05-30

## 2021-05-26 RX ORDER — CHLORTHALIDONE 25 MG/1
TABLET ORAL
Qty: 90 TABLET | Refills: 3 | Status: SHIPPED | OUTPATIENT
Start: 2021-05-26 | End: 2022-05-30

## 2021-06-03 ENCOUNTER — PATIENT MESSAGE (OUTPATIENT)
Dept: FAMILY MEDICINE CLINIC | Age: 86
End: 2021-06-03

## 2021-06-03 RX ORDER — ALLOPURINOL 100 MG/1
200 TABLET ORAL DAILY
Qty: 180 TABLET | Refills: 3 | Status: SHIPPED | OUTPATIENT
Start: 2021-06-03 | End: 2021-06-04 | Stop reason: SDUPTHER

## 2021-06-03 NOTE — TELEPHONE ENCOUNTER
Last OV: 12/11/20  Next Appt: 06/18/21  Last Refill: 7/2019    Requested Prescriptions     Pending Prescriptions Disp Refills    allopurinoL (ZYLOPRIM) 100 mg tablet 180 Tablet 3     Sig: Take 2 Tablets by mouth daily.  To prevent gout

## 2021-06-03 NOTE — TELEPHONE ENCOUNTER
From: Mahendra Chaudhry  To: Ezio Loya MD  Sent: 6/3/2021 9:59 AM EDT  Subject: Prescription Question    DR. MURO CAN YOU PLEASE SEND A REFILL FOR ME TO [facility] ON THE CORNER OF [facility] [address] FOR MY ALLOPURINOL TABS. 100MG.     Lashon Waters B.D. 6/4/1932   [facility]

## 2021-06-04 RX ORDER — ALLOPURINOL 100 MG/1
200 TABLET ORAL DAILY
Qty: 60 TABLET | Refills: 0 | Status: SHIPPED | OUTPATIENT
Start: 2021-06-04 | End: 2021-06-28

## 2021-06-18 ENCOUNTER — OFFICE VISIT (OUTPATIENT)
Dept: FAMILY MEDICINE CLINIC | Age: 86
End: 2021-06-18
Payer: MEDICARE

## 2021-06-18 VITALS
HEIGHT: 60 IN | DIASTOLIC BLOOD PRESSURE: 51 MMHG | OXYGEN SATURATION: 93 % | RESPIRATION RATE: 16 BRPM | TEMPERATURE: 97.5 F | BODY MASS INDEX: 36.12 KG/M2 | WEIGHT: 184 LBS | HEART RATE: 61 BPM | SYSTOLIC BLOOD PRESSURE: 118 MMHG

## 2021-06-18 DIAGNOSIS — I10 ESSENTIAL HYPERTENSION: Primary | ICD-10-CM

## 2021-06-18 DIAGNOSIS — Z79.4 CONTROLLED TYPE 2 DIABETES MELLITUS WITH STAGE 3 CHRONIC KIDNEY DISEASE, WITH LONG-TERM CURRENT USE OF INSULIN (HCC): ICD-10-CM

## 2021-06-18 DIAGNOSIS — M17.11 PRIMARY OSTEOARTHRITIS OF RIGHT KNEE: ICD-10-CM

## 2021-06-18 DIAGNOSIS — E78.00 HYPERCHOLESTEROLEMIA: ICD-10-CM

## 2021-06-18 DIAGNOSIS — N18.30 CONTROLLED TYPE 2 DIABETES MELLITUS WITH STAGE 3 CHRONIC KIDNEY DISEASE, WITH LONG-TERM CURRENT USE OF INSULIN (HCC): ICD-10-CM

## 2021-06-18 DIAGNOSIS — E11.22 CONTROLLED TYPE 2 DIABETES MELLITUS WITH STAGE 3 CHRONIC KIDNEY DISEASE, WITH LONG-TERM CURRENT USE OF INSULIN (HCC): ICD-10-CM

## 2021-06-18 LAB
ALBUMIN SERPL-MCNC: 3.9 G/DL (ref 3.5–5)
ALBUMIN/GLOB SERPL: 1 {RATIO} (ref 1.1–2.2)
ALP SERPL-CCNC: 123 U/L (ref 45–117)
ALT SERPL-CCNC: 24 U/L (ref 12–78)
ANION GAP SERPL CALC-SCNC: 7 MMOL/L (ref 5–15)
AST SERPL-CCNC: 21 U/L (ref 15–37)
BASOPHILS # BLD: 0 K/UL (ref 0–0.1)
BASOPHILS NFR BLD: 1 % (ref 0–1)
BILIRUB SERPL-MCNC: 0.6 MG/DL (ref 0.2–1)
BUN SERPL-MCNC: 33 MG/DL (ref 6–20)
BUN/CREAT SERPL: 20 (ref 12–20)
CALCIUM SERPL-MCNC: 9.8 MG/DL (ref 8.5–10.1)
CHLORIDE SERPL-SCNC: 104 MMOL/L (ref 97–108)
CHOLEST SERPL-MCNC: 132 MG/DL
CO2 SERPL-SCNC: 29 MMOL/L (ref 21–32)
CREAT SERPL-MCNC: 1.66 MG/DL (ref 0.55–1.02)
DIFFERENTIAL METHOD BLD: ABNORMAL
EOSINOPHIL # BLD: 0.2 K/UL (ref 0–0.4)
EOSINOPHIL NFR BLD: 4 % (ref 0–7)
ERYTHROCYTE [DISTWIDTH] IN BLOOD BY AUTOMATED COUNT: 16.3 % (ref 11.5–14.5)
GLOBULIN SER CALC-MCNC: 4.1 G/DL (ref 2–4)
GLUCOSE SERPL-MCNC: 83 MG/DL (ref 65–100)
HBA1C MFR BLD HPLC: 6 %
HCT VFR BLD AUTO: 39.8 % (ref 35–47)
HDLC SERPL-MCNC: 47 MG/DL
HDLC SERPL: 2.8 {RATIO} (ref 0–5)
HGB BLD-MCNC: 12.4 G/DL (ref 11.5–16)
IMM GRANULOCYTES # BLD AUTO: 0 K/UL (ref 0–0.04)
IMM GRANULOCYTES NFR BLD AUTO: 0 % (ref 0–0.5)
LDLC SERPL CALC-MCNC: 53.4 MG/DL (ref 0–100)
LYMPHOCYTES # BLD: 3.2 K/UL (ref 0.8–3.5)
LYMPHOCYTES NFR BLD: 51 % (ref 12–49)
MCH RBC QN AUTO: 29.1 PG (ref 26–34)
MCHC RBC AUTO-ENTMCNC: 31.2 G/DL (ref 30–36.5)
MCV RBC AUTO: 93.4 FL (ref 80–99)
MONOCYTES # BLD: 0.5 K/UL (ref 0–1)
MONOCYTES NFR BLD: 9 % (ref 5–13)
NEUTS SEG # BLD: 2.2 K/UL (ref 1.8–8)
NEUTS SEG NFR BLD: 35 % (ref 32–75)
NRBC # BLD: 0 K/UL (ref 0–0.01)
NRBC BLD-RTO: 0 PER 100 WBC
PLATELET # BLD AUTO: 272 K/UL (ref 150–400)
PMV BLD AUTO: 11.1 FL (ref 8.9–12.9)
POTASSIUM SERPL-SCNC: 4.3 MMOL/L (ref 3.5–5.1)
PROT SERPL-MCNC: 8 G/DL (ref 6.4–8.2)
RBC # BLD AUTO: 4.26 M/UL (ref 3.8–5.2)
SODIUM SERPL-SCNC: 140 MMOL/L (ref 136–145)
TRIGL SERPL-MCNC: 158 MG/DL (ref ?–150)
VLDLC SERPL CALC-MCNC: 31.6 MG/DL
WBC # BLD AUTO: 6.2 K/UL (ref 3.6–11)

## 2021-06-18 PROCEDURE — G8417 CALC BMI ABV UP PARAM F/U: HCPCS | Performed by: FAMILY MEDICINE

## 2021-06-18 PROCEDURE — G8536 NO DOC ELDER MAL SCRN: HCPCS | Performed by: FAMILY MEDICINE

## 2021-06-18 PROCEDURE — 1101F PT FALLS ASSESS-DOCD LE1/YR: CPT | Performed by: FAMILY MEDICINE

## 2021-06-18 PROCEDURE — G0463 HOSPITAL OUTPT CLINIC VISIT: HCPCS | Performed by: FAMILY MEDICINE

## 2021-06-18 PROCEDURE — G8510 SCR DEP NEG, NO PLAN REQD: HCPCS | Performed by: FAMILY MEDICINE

## 2021-06-18 PROCEDURE — 99213 OFFICE O/P EST LOW 20 MIN: CPT | Performed by: FAMILY MEDICINE

## 2021-06-18 PROCEDURE — 83036 HEMOGLOBIN GLYCOSYLATED A1C: CPT | Performed by: FAMILY MEDICINE

## 2021-06-18 PROCEDURE — 1090F PRES/ABSN URINE INCON ASSESS: CPT | Performed by: FAMILY MEDICINE

## 2021-06-18 PROCEDURE — 20610 DRAIN/INJ JOINT/BURSA W/O US: CPT | Performed by: FAMILY MEDICINE

## 2021-06-18 PROCEDURE — G8427 DOCREV CUR MEDS BY ELIG CLIN: HCPCS | Performed by: FAMILY MEDICINE

## 2021-06-18 RX ORDER — METHYLPREDNISOLONE ACETATE 40 MG/ML
40 INJECTION, SUSPENSION INTRA-ARTICULAR; INTRALESIONAL; INTRAMUSCULAR; SOFT TISSUE ONCE
Qty: 1 ML | Refills: 0
Start: 2021-06-18 | End: 2021-06-18

## 2021-06-18 RX ORDER — METHOCARBAMOL 500 MG/1
TABLET, FILM COATED ORAL
COMMUNITY

## 2021-06-18 RX ORDER — GABAPENTIN 300 MG/1
CAPSULE ORAL
COMMUNITY
Start: 2020-11-17 | End: 2021-06-18 | Stop reason: ALTCHOICE

## 2021-06-18 RX ORDER — HYDROCODONE BITARTRATE AND ACETAMINOPHEN 7.5; 325 MG/1; MG/1
TABLET ORAL
COMMUNITY

## 2021-06-18 NOTE — PROGRESS NOTES
Chief Complaint   Patient presents with    Hypertension    Cholesterol Problem    Diabetes    Follow-up     1. Have you been to the ER, urgent care clinic since your last visit? Hospitalized since your last visit? No    2. Have you seen or consulted any other health care providers outside of the [address] [place] [address] since your last visit? Include any pap smears or colon screening. No     Patient having right knee pain - chronic issue. [address]  OFFICE PROCEDURE PROGRESS NOTE        Chart reviewed for the following:   Angelo Ortez LPN, have reviewed the History, Physical and updated the Allergic reactions for [address] performed immediately prior to start of procedure:   Angelo Ortez LPN, have performed the following reviews on [address] prior to the start of the procedure:            * Patient was identified by name and date of birth   * Agreement on procedure being performed was verified  * Risks and Benefits explained to the patient  * Procedure site verified and marked as necessary  * Patient was positioned for comfort  * Consent was signed and verified     Time: 12:05 p.m. Date of procedure: 6/18/2021    Procedure performed by:  Barbara Brennan MD    Provider assisted by: Noah Howell LPN    Patient assisted by: self    How tolerated by patient: tolerated the procedure well with no complications    Post Procedural Pain Scale: 2 - Hurts Little Bit    Comments: none, Procedure for depo medrol 40 mg into right knee reviewed with patient by provider.

## 2021-06-18 NOTE — PROGRESS NOTES
HISTORY OF PRESENT ILLNESS  Wisam Gabriel is a 80 y.o. female. HPI Brought in by daughter Mis Hernandez. Has been having some cramps in legs, and also wants a shot in R knee. No complaints of chest pain, shortness of breath, TIAs, claudication or edema. Checks blood sugars, readings usually around 90. No hypoglycemic episodes. Some pain in neck. Takes tyl #3 prn pain. ROS    Physical Exam  Vitals and nursing note reviewed. Constitutional:       Appearance: She is well-developed. HENT:      Right Ear: External ear normal.      Left Ear: External ear normal.   Neck:      Thyroid: No thyromegaly. Cardiovascular:      Rate and Rhythm: Normal rate and regular rhythm. Heart sounds: Normal heart sounds. Pulmonary:      Breath sounds: Normal breath sounds. No wheezing. Abdominal:      General: Bowel sounds are normal. There is no distension. Palpations: Abdomen is soft. There is no mass. Tenderness: There is no abdominal tenderness. Musculoskeletal:      Comments: R knee- moderate crepitus and a small effusion   Lymphadenopathy:      Cervical: No cervical adenopathy.          ASSESSMENT and PLAN  Orders Placed This Encounter    CBC WITH AUTOMATED DIFF     Standing Status:   Future     Number of Occurrences:   1     Standing Expiration Date:   75/28/6480    METABOLIC PANEL, COMPREHENSIVE     Standing Status:   Future     Number of Occurrences:   1     Standing Expiration Date:   12/18/2022    LIPID PANEL     Standing Status:   Future     Number of Occurrences:   1     Standing Expiration Date:   12/18/2022    AMB POC HEMOGLOBIN A1C    (DEPO-MEDROL 40 mg  -   quantity 1 for Reimbursement) METHYLPREDNISOLONE ACETATE 40 mg injection     Submit quantity per 40 mg injection     Order Specific Question:   Dose     Answer:   Depo Medrol 40 mg     Order Specific Question:   Site     Answer:   RIGHT KNEE     Order Specific Question:   Expiration Date     Answer:   11/1/2021     Order Specific Question:   Lot#     Answer:   [id number]     Order Specific Question:        Answer:   teva     Order Specific Question:   Charge Quantity? Answer:   1     Order Specific Question:   Perfomed by/Witnessed by: Answer:   Dr. Misa Gallardo Samy     Order Specific Question:   NDC#     Answer:   7609-5564-18 [320514]    45261 - DRAIN/INJECT LARGE JOINT/BURSA    methylPREDNISolone acetate (DEPO-MedroL) 40 mg/mL injection     Si mL by IntraMUSCular route once for 1 dose. Dispense:  1 mL     Refill:  0     Orders Placed This Encounter    CBC WITH AUTOMATED DIFF    METABOLIC PANEL, COMPREHENSIVE    LIPID PANEL    AMB POC HEMOGLOBIN A1C    (DEPO-MEDROL 40 mg  -   quantity 1 for Reimbursement) METHYLPREDNISOLONE ACETATE 40 mg injection     - DRAIN/INJECT LARGE JOINT/BURSA    methylPREDNISolone acetate (DEPO-MedroL) 40 mg/mL injection     Diagnoses and all orders for this visit:    1. Essential hypertension  -     CBC WITH AUTOMATED DIFF; Future  -     METABOLIC PANEL, COMPREHENSIVE; Future    2. Hypercholesterolemia  -     LIPID PANEL; Future    3. Controlled type 2 diabetes mellitus with stage 3 chronic kidney disease, with long-term current use of insulin ([facility])  -     AMB POC HEMOGLOBIN A1C    4. Primary osteoarthritis of right knee  -     methylPREDNISolone acetate (DEPO-MedroL) 40 mg/mL injection; 1 mL by IntraMUSCular route once for 1 dose.   -     METHYLPREDNISOLONE ACETATE INJECTION 40 MG  -     [place] DRAIN/INJECT LARGE JOINT/BURSA

## 2021-06-28 RX ORDER — ALLOPURINOL 100 MG/1
200 TABLET ORAL DAILY
Qty: 60 TABLET | Refills: 0 | Status: SHIPPED | OUTPATIENT
Start: 2021-06-28 | End: 2021-08-16 | Stop reason: SDUPTHER

## 2021-09-08 ENCOUNTER — TELEPHONE (OUTPATIENT)
Dept: FAMILY MEDICINE CLINIC | Age: 86
End: 2021-09-08

## 2021-09-08 NOTE — TELEPHONE ENCOUNTER
Patient's daughter called regarding patient. Patient is having eye surgery on Sept 21st and needs a pre-op before she can have it. There is not an appt avail. Please call @[phone].

## 2021-09-13 NOTE — TELEPHONE ENCOUNTER
Verified patient with two type of identifiers. Pt's daughter states when she did not get a call back she spoke with the surgeons office and they were okay with pt not having pre op with Luis M Sanderson MD since she had a pre op at the hospital. Pt's daughter states needs nothing else at this time.

## 2021-12-02 ENCOUNTER — TELEPHONE (OUTPATIENT)
Dept: FAMILY MEDICINE CLINIC | Age: 86
End: 2021-12-02

## 2021-12-02 NOTE — TELEPHONE ENCOUNTER
Patient is calling, because she wants her covid booster. She has an appt Dec 20th, and she would like to get it at the same time. Please call @[phone].

## 2021-12-09 ENCOUNTER — CLINICAL SUPPORT (OUTPATIENT)
Dept: FAMILY MEDICINE CLINIC | Age: 86
End: 2021-12-09
Payer: MEDICARE

## 2021-12-09 DIAGNOSIS — Z23 ENCOUNTER FOR ADMINISTRATION OF COVID-19 VACCINE: Primary | ICD-10-CM

## 2021-12-09 PROCEDURE — 91300 COVID-19, MRNA, LNP-S, PF, 30MCG/0.3ML DOSE(PFIZER): CPT | Performed by: FAMILY MEDICINE

## 2021-12-09 NOTE — PROGRESS NOTES
Verbal Order with Readback given by Annie Mckeon MD for Pfizer COVID-19 Booster, 0.3 mL. Given in right deltoid without difficulty. Pt monitored for 15 minutes with no reaction.

## 2021-12-20 ENCOUNTER — OFFICE VISIT (OUTPATIENT)
Dept: FAMILY MEDICINE CLINIC | Age: 86
End: 2021-12-20
Payer: MEDICARE

## 2021-12-20 VITALS
OXYGEN SATURATION: 97 % | BODY MASS INDEX: 36.01 KG/M2 | HEIGHT: 60 IN | HEART RATE: 60 BPM | WEIGHT: 183.4 LBS | DIASTOLIC BLOOD PRESSURE: 55 MMHG | SYSTOLIC BLOOD PRESSURE: 115 MMHG

## 2021-12-20 DIAGNOSIS — M1A.00X0 IDIOPATHIC CHRONIC GOUT WITHOUT TOPHUS, UNSPECIFIED SITE: ICD-10-CM

## 2021-12-20 DIAGNOSIS — N18.30 CONTROLLED TYPE 2 DIABETES MELLITUS WITH STAGE 3 CHRONIC KIDNEY DISEASE, WITH LONG-TERM CURRENT USE OF INSULIN (HCC): ICD-10-CM

## 2021-12-20 DIAGNOSIS — Z00.00 ENCOUNTER FOR MEDICARE ANNUAL WELLNESS EXAM: Primary | ICD-10-CM

## 2021-12-20 DIAGNOSIS — Z79.4 CONTROLLED TYPE 2 DIABETES MELLITUS WITH STAGE 3 CHRONIC KIDNEY DISEASE, WITH LONG-TERM CURRENT USE OF INSULIN (HCC): ICD-10-CM

## 2021-12-20 DIAGNOSIS — I10 ESSENTIAL HYPERTENSION: ICD-10-CM

## 2021-12-20 DIAGNOSIS — E11.22 CONTROLLED TYPE 2 DIABETES MELLITUS WITH STAGE 3 CHRONIC KIDNEY DISEASE, WITH LONG-TERM CURRENT USE OF INSULIN (HCC): ICD-10-CM

## 2021-12-20 DIAGNOSIS — M17.11 PRIMARY OSTEOARTHRITIS OF RIGHT KNEE: ICD-10-CM

## 2021-12-20 DIAGNOSIS — E78.00 HYPERCHOLESTEROLEMIA: ICD-10-CM

## 2021-12-20 DIAGNOSIS — E66.01 SEVERE OBESITY (BMI 35.0-35.9 WITH COMORBIDITY) (HCC): ICD-10-CM

## 2021-12-20 LAB
ALBUMIN SERPL-MCNC: 3.9 G/DL (ref 3.5–5)
ALBUMIN/GLOB SERPL: 1 {RATIO} (ref 1.1–2.2)
ALP SERPL-CCNC: 105 U/L (ref 45–117)
ALT SERPL-CCNC: 23 U/L (ref 12–78)
ANION GAP SERPL CALC-SCNC: 4 MMOL/L (ref 5–15)
AST SERPL-CCNC: 20 U/L (ref 15–37)
BASOPHILS # BLD: 0 K/UL (ref 0–0.1)
BASOPHILS NFR BLD: 1 % (ref 0–1)
BILIRUB SERPL-MCNC: 0.6 MG/DL (ref 0.2–1)
BUN SERPL-MCNC: 31 MG/DL (ref 6–20)
BUN/CREAT SERPL: 16 (ref 12–20)
CALCIUM SERPL-MCNC: 10.2 MG/DL (ref 8.5–10.1)
CHLORIDE SERPL-SCNC: 107 MMOL/L (ref 97–108)
CHOLEST SERPL-MCNC: 129 MG/DL
CO2 SERPL-SCNC: 28 MMOL/L (ref 21–32)
CREAT SERPL-MCNC: 1.92 MG/DL (ref 0.55–1.02)
DIFFERENTIAL METHOD BLD: ABNORMAL
EOSINOPHIL # BLD: 0.2 K/UL (ref 0–0.4)
EOSINOPHIL NFR BLD: 3 % (ref 0–7)
ERYTHROCYTE [DISTWIDTH] IN BLOOD BY AUTOMATED COUNT: 16.8 % (ref 11.5–14.5)
EST. AVERAGE GLUCOSE BLD GHB EST-MCNC: 134 MG/DL
GLOBULIN SER CALC-MCNC: 4 G/DL (ref 2–4)
GLUCOSE SERPL-MCNC: 86 MG/DL (ref 65–100)
HBA1C MFR BLD: 6.3 % (ref 4–5.6)
HCT VFR BLD AUTO: 39.3 % (ref 35–47)
HDLC SERPL-MCNC: 48 MG/DL
HDLC SERPL: 2.7 {RATIO} (ref 0–5)
HGB BLD-MCNC: 12.1 G/DL (ref 11.5–16)
IMM GRANULOCYTES # BLD AUTO: 0 K/UL (ref 0–0.04)
IMM GRANULOCYTES NFR BLD AUTO: 0 % (ref 0–0.5)
LDLC SERPL CALC-MCNC: 59.8 MG/DL (ref 0–100)
LYMPHOCYTES # BLD: 3.4 K/UL (ref 0.8–3.5)
LYMPHOCYTES NFR BLD: 47 % (ref 12–49)
MCH RBC QN AUTO: 29 PG (ref 26–34)
MCHC RBC AUTO-ENTMCNC: 30.8 G/DL (ref 30–36.5)
MCV RBC AUTO: 94.2 FL (ref 80–99)
MONOCYTES # BLD: 0.6 K/UL (ref 0–1)
MONOCYTES NFR BLD: 8 % (ref 5–13)
NEUTS SEG # BLD: 3 K/UL (ref 1.8–8)
NEUTS SEG NFR BLD: 41 % (ref 32–75)
NRBC # BLD: 0 K/UL (ref 0–0.01)
NRBC BLD-RTO: 0 PER 100 WBC
PLATELET # BLD AUTO: 302 K/UL (ref 150–400)
PMV BLD AUTO: 10.7 FL (ref 8.9–12.9)
POTASSIUM SERPL-SCNC: 4.5 MMOL/L (ref 3.5–5.1)
PROT SERPL-MCNC: 7.9 G/DL (ref 6.4–8.2)
RBC # BLD AUTO: 4.17 M/UL (ref 3.8–5.2)
SODIUM SERPL-SCNC: 139 MMOL/L (ref 136–145)
TRIGL SERPL-MCNC: 106 MG/DL (ref ?–150)
URATE SERPL-MCNC: 5.8 MG/DL (ref 2.6–6)
VLDLC SERPL CALC-MCNC: 21.2 MG/DL
WBC # BLD AUTO: 7.3 K/UL (ref 3.6–11)

## 2021-12-20 PROCEDURE — G0463 HOSPITAL OUTPT CLINIC VISIT: HCPCS | Performed by: FAMILY MEDICINE

## 2021-12-20 PROCEDURE — G8510 SCR DEP NEG, NO PLAN REQD: HCPCS | Performed by: FAMILY MEDICINE

## 2021-12-20 PROCEDURE — G8427 DOCREV CUR MEDS BY ELIG CLIN: HCPCS | Performed by: FAMILY MEDICINE

## 2021-12-20 PROCEDURE — 99213 OFFICE O/P EST LOW 20 MIN: CPT | Performed by: FAMILY MEDICINE

## 2021-12-20 PROCEDURE — G8417 CALC BMI ABV UP PARAM F/U: HCPCS | Performed by: FAMILY MEDICINE

## 2021-12-20 PROCEDURE — 1090F PRES/ABSN URINE INCON ASSESS: CPT | Performed by: FAMILY MEDICINE

## 2021-12-20 PROCEDURE — 1101F PT FALLS ASSESS-DOCD LE1/YR: CPT | Performed by: FAMILY MEDICINE

## 2021-12-20 PROCEDURE — 20610 DRAIN/INJ JOINT/BURSA W/O US: CPT | Performed by: FAMILY MEDICINE

## 2021-12-20 PROCEDURE — G8536 NO DOC ELDER MAL SCRN: HCPCS | Performed by: FAMILY MEDICINE

## 2021-12-20 RX ORDER — GABAPENTIN 300 MG/1
CAPSULE ORAL
COMMUNITY

## 2021-12-20 RX ORDER — OFLOXACIN 3 MG/ML
SOLUTION/ DROPS OPHTHALMIC
COMMUNITY
Start: 2021-09-21 | End: 2022-06-20 | Stop reason: ALTCHOICE

## 2021-12-20 RX ORDER — AMMONIUM LACTATE 12 G/100G
CREAM TOPICAL
COMMUNITY
Start: 2021-11-09

## 2021-12-20 RX ORDER — TRIAMCINOLONE ACETONIDE 40 MG/ML
40 INJECTION, SUSPENSION INTRA-ARTICULAR; INTRAMUSCULAR ONCE
Qty: 1 ML | Refills: 0
Start: 2021-12-20 | End: 2021-12-20

## 2021-12-20 RX ORDER — PREDNISOLONE ACETATE 10 MG/ML
SUSPENSION/ DROPS OPHTHALMIC
COMMUNITY
Start: 2021-10-29 | End: 2022-06-20 | Stop reason: ALTCHOICE

## 2021-12-20 NOTE — PROGRESS NOTES
HISTORY OF PRESENT ILLNESS  Ashly Velásquez is a 80 y.o. female. HPI Pt. Comes in for blood pressure, cholesterol, and diabetes check. Has been having pain in R knee- helped by cortisone shot in the past. No complaints of chest pain, shortness of breath, TIAs, claudication or edema. Some L shoulder pain. Needs medicare wellness exam. Sees Dr. Torsten Aguayo (nephrology), Dr. Margoth Villaseñor (vascular). Has had covid vaccine. Declines other vaccines. Would want daughter Nuno Cali to be her mPOA if she became incapacitated. Review of Systems   HENT: Negative for hearing loss. Neurological:        No falls, . Uses a cane. Independent in all ADLs. Memory is fairly good. Psychiatric/Behavioral: Negative for depression. No alcohol       Physical Exam  Vitals and nursing note reviewed. Constitutional:       Appearance: She is well-developed. HENT:      Right Ear: External ear normal.      Left Ear: External ear normal.   Neck:      Thyroid: No thyromegaly. Cardiovascular:      Rate and Rhythm: Normal rate and regular rhythm. Heart sounds: Normal heart sounds. Pulmonary:      Breath sounds: Normal breath sounds. No wheezing. Abdominal:      General: Bowel sounds are normal. There is no distension. Palpations: Abdomen is soft. There is no mass. Tenderness: There is no abdominal tenderness. Musculoskeletal:      Comments: R knee- moderate crepitus   Lymphadenopathy:      Cervical: No cervical adenopathy. ASSESSMENT and PLAN  Orders Placed This Encounter    CBC WITH AUTOMATED DIFF    METABOLIC PANEL, COMPREHENSIVE    LIPID PANEL    HEMOGLOBIN A1C WITH EAG    URIC ACID    TRIAMCINOLONE ACETONIDE INJ    20610 - DRAIN/INJECT LARGE JOINT/BURSA    triamcinolone acetonide (Kenalog) 40 mg/mL injection     Diagnoses and all orders for this visit:    1. Encounter for Medicare annual wellness exam    2. Essential hypertension  -     CBC WITH AUTOMATED DIFF;  Future  - METABOLIC PANEL, COMPREHENSIVE; Future    3. Hypercholesterolemia  -     LIPID PANEL; Future    4. Controlled type 2 diabetes mellitus with stage 3 chronic kidney disease, with long-term current use of insulin ([facility])  -     HEMOGLOBIN A1C WITH EAG; Future    5. Idiopathic chronic gout without tophus, unspecified site  -     URIC ACID; Future    6. Severe obesity (BMI 35.0-35.9 with comorbidity) ([facility]    7. Primary osteoarthritis of right knee  -     TRIAMCINOLONE ACETONIDE INJ  -     triamcinolone acetonide (Kenalog) 40 mg/mL injection; 1 mL by Intra artICUlar route once for 1 dose. -     [place] DRAIN/INJECT LARGE JOINT/BURSA      Follow-up and Dispositions    · Return in about 6 months (around 6/20/2022).

## 2021-12-20 NOTE — PROGRESS NOTES
Room 1     Identified pt with two pt identifiers(name and ). Reviewed record in preparation for visit and have obtained necessary documentation. All patient medications has been reviewed. Chief Complaint   Patient presents with    Knee Pain     pt want shot in knee    Hypertension    Cholesterol Problem    Diabetes       3 most recent PHQ Screens 2021   Little interest or pleasure in doing things Not at all   Feeling down, depressed, irritable, or hopeless Not at all   Total Score PHQ 2 0     Abuse Screening Questionnaire 2019   Do you ever feel afraid of your partner? N   Are you in a relationship with someone who physically or mentally threatens you? N   Is it safe for you to go home? Y       Health Maintenance Due   Topic    Shingrix Vaccine Age 50> (1 of 2)    Foot Exam Q1     Eye Exam Retinal or Dilated     MICROALBUMIN Q1     Flu Vaccine (1)    Medicare Yearly Exam          Health Maintenance Review: Patient reminded of \"due or due soon\" health maintenance. I have asked the patient to contact his/her primary care provider (PCP) for follow-up on his/her health maintenance. Vitals:    21 0816   BP: (!) 115/55   Pulse: 60   SpO2: 97%   Weight: 183 lb 6.4 oz (83.2 kg)   Height: 5' (1.524 m)   PainSc:   0 - No pain       Wt Readings from Last 3 Encounters:   21 183 lb 6.4 oz (83.2 kg)   21 184 lb (83.5 kg)   20 185 lb (83.9 kg)     Temp Readings from Last 3 Encounters:   21 97.5 °F (36.4 °C) (Oral)   20 98.4 °F (36.9 °C) (Skin)   20 98.5 °F (36.9 °C)     BP Readings from Last 3 Encounters:   21 (!) 115/55   21 (!) 118/51   20 110/60     Pulse Readings from Last 3 Encounters:   21 60   21 61   20 60       Coordination of Care Questionnaire:   1) Have you been to an emergency room, urgent care, or hospitalized since your last visit?   no       2.  Have seen or consulted any other health care provider since your last visit? NO    Patient is accompanied by self and daughter I have received verbal consent from Sadiq Carlisle to discuss any/all medical information while they are present in the room.

## 2022-02-05 ENCOUNTER — HOSPITAL ENCOUNTER (EMERGENCY)
Age: 87
Discharge: HOME | End: 2022-02-05
Attending: EMERGENCY MEDICINE
Payer: MEDICARE

## 2022-02-05 VITALS
HEIGHT: 60 IN | HEART RATE: 74 BPM | WEIGHT: 180 LBS | OXYGEN SATURATION: 98 % | SYSTOLIC BLOOD PRESSURE: 151 MMHG | TEMPERATURE: 98 F | BODY MASS INDEX: 35.34 KG/M2 | RESPIRATION RATE: 16 BRPM | DIASTOLIC BLOOD PRESSURE: 121 MMHG

## 2022-02-05 DIAGNOSIS — K64.9 HEMORRHOIDS, UNSPECIFIED HEMORRHOID TYPE: ICD-10-CM

## 2022-02-05 DIAGNOSIS — K62.5 BRBPR (BRIGHT RED BLOOD PER RECTUM): Primary | ICD-10-CM

## 2022-02-05 PROCEDURE — 99282 EMERGENCY DEPT VISIT SF MDM: CPT

## 2022-02-05 RX ORDER — HYDROCORTISONE 25 MG/G
CREAM TOPICAL 4 TIMES DAILY
Qty: 30 G | Refills: 0 | Status: SHIPPED | OUTPATIENT
Start: 2022-02-05

## 2022-02-05 NOTE — ED NOTES
Patient presents to ED with c/o anal pain and blood when wiping that began on Thursday. Hx of hemmrhoids. Patient is alert and oriented x 4 and in no acute distress at this time. Respirations are at a regular rate, depth, and pattern. Patient updated on plan of care and has no questions or concerns at this time. Call bell within reach. Will continue to monitor. Please reference nursing assessment. Emergency Department Nursing Plan of Care       The Nursing Plan of Care is developed from the Nursing assessment and Emergency Department Attending provider initial evaluation. The plan of care may be reviewed in the ED Provider note.     The Plan of Care was developed with the following considerations:   Patient / Family readiness to learn indicated by:verbalized understanding and successful return demonstration  Persons(s) to be included in education: patient  Barriers to Learning/Limitations:No    Signed     Shelia Ibanez RN    2/5/2022   9:45 AM

## 2022-02-05 NOTE — PROGRESS NOTES
SOLIS   Reviewed chart due to ACO  Patient   Work-up in progress     Per PCP note in December  ( Sees Dr. Mendoza Lopez (nephrology), Dr. Yvette Bullock (vascular). Has had covid vaccine. Declines other vaccines. Would want daughter Geraldine Montes De Oca to be her mPOA if she became incapacitated.)     If not admit will follow-up on Monday for follow-up  Last seen in PCP office in December- His next routine follow-up is in June- will need to jose off for earlier appointment due to post hospital follow-up needed.    [id number] ROUTINE CARE 8:20 AM   Mi Conteh MD  [address] [id number]   Gwen [address] MSW RN  [phone]

## 2022-02-05 NOTE — ED PROVIDER NOTES
EMERGENCY DEPARTMENT HISTORY AND PHYSICAL EXAM      Date: 2/5/2022  Patient Name: Joanna Nayak    History of Presenting Illness     Chief Complaint   Patient presents with    Anal Pain     History Provided By: Patient    HPI: Joanna Nayak, 80 y.o. female with PMHx of HTN, DM, presents BIB self to the ED with cc of rectal pain and bleeding x 2 days. Pt reports feeling \"a ball or a knot\" in her rectum on Thursday morning. Since then she has seen small amounts of red blood on wiping only. Admits to having constipation earlier in the week, improved with prune juice. Pt has had brown stool, denies black stool or blood mixed in with stool, abdominal pain, N/V, fevers, dizziness, CP, SOB. She is not anticoagulated. Pt states last colonoscopy was \"a long long time ago. \" Pt does not think she has a h/o hemorrhoids, but her daughter reportedly told her she does. There are no other complaints, changes, or physical findings at this time. PCP: Liyah Molina MD    No current facility-administered medications on file prior to encounter. Current Outpatient Medications on File Prior to Encounter   Medication Sig Dispense Refill    prednisoLONE acetate (PRED FORTE) 1 % ophthalmic suspension  (Patient not taking: Reported on 12/20/2021)      ofloxacin (FLOXIN) 0.3 % ophthalmic solution  (Patient not taking: Reported on 12/20/2021)      gabapentin (NEURONTIN) 300 mg capsule gabapentin 300 mg capsule   Take 1 capsule every day by oral route.  ammonium lactate (AMLACTIN) 12 % topical cream       allopurinoL (ZYLOPRIM) 100 mg tablet Take 2 Tablets by mouth daily.  To prevent gout 60 Tablet 5    insulin NPH (NovoLIN N NPH U-100 Insulin) 100 unit/mL injection INJECT SUBCUTANEOUSLY  24 UNITS IN THE MORNING  AND  12 UNITS IN THE EVENING 40 mL 5    HYDROcodone-acetaminophen (NORCO) 7.5-325 mg per tablet hydrocodone 7.5 mg-acetaminophen 325 mg tablet   TAKE 1 TAB EVERY 6 HOURS AS NEEDED PAIN/1 2 TABS EVERY 8 12 HOURS AS NEEDED LEG PAIN. MAX 4 TABS/DAY (Patient not taking: Reported on 6/18/2021)      methocarbamoL (ROBAXIN) 500 mg tablet methocarbamol 500 mg tablet   TAKE 1 TABLET BY MOUTH EVERY 8 HOURS AS NEEDED FOR MUSCLE RELAXER      chlorthalidone (HYGROTON) 25 mg tablet TAKE 1 TABLET EVERY DAY 90 Tablet 3    atorvastatin (LIPITOR) 20 mg tablet TAKE 1 TABLET EVERY DAY FOR CHOLESTEROL 90 Tablet 3    amLODIPine (NORVASC) 5 mg tablet TAKE 1 TABLET EVERY DAY 90 Tablet 3    losartan (COZAAR) 25 mg tablet TAKE 1 TABLET EVERY DAY 90 Tablet 3    Droplet Insulin Syr Half Unit 0.5 mL 30 gauge x 1/2\" syrg USE AS DIRECTED  WITH  INSULIN 90 Syringe 5    glucose blood VI test strips (ASCENSIA AUTODISC VI, ONE TOUCH ULTRA TEST VI) strip TEST ONE TIME DAILY. 100 Strip 3    lidocaine 5 % topical cream Apply  to affected area two (2) times daily as needed for Pain. 15 g 3    Insulin Syringe-Needle U-100 (BD INSULIN SYRINGE ULTRA-FINE) 0.5 mL 30 gauge x 1/2\" syrg USE AS DIRECTED WITH INSULIN 100 Syringe 3    aspirin 81 mg tablet Take 81 mg by mouth daily. Past History     Past Medical History:  Past Medical History:   Diagnosis Date    Diabetes ([facility] [address]     Hypercholesterolemia     Hypertension     Other ill-defined conditions(799.89)     gout    Peripheral vascular disease ([facility] [address]     Refusal of blood transfusions as patient is Yarsanism 2/26/2013       Past Surgical History:  Past Surgical History:   Procedure Laterality Date    HX ANGIOPLASTY  2014- demi    L popliteal artery    HX CHOLECYSTECTOMY      HX HYSTERECTOMY      CO CABG, ARTERIAL, FOUR+  2005- Dr. Joel Tobias       Family History:  No family history on file. Social History:  Social History     Tobacco Use    Smoking status: Never Smoker    Smokeless tobacco: Never Used   Substance Use Topics    Alcohol use: No    Drug use: No       Allergies:   Allergies   Allergen Reactions    Contrast Agent [Iodine] Other (comments)     Affects kidneys    Morphine Other (comments)     Pt does not know the reaction         Review of Systems   Review of Systems   Constitutional: Negative for diaphoresis and fatigue. Respiratory: Negative for shortness of breath. Cardiovascular: Negative for chest pain. Gastrointestinal: Positive for anal bleeding. Negative for abdominal pain, nausea and vomiting. Genitourinary: Negative for dysuria. Neurological: Negative for dizziness. All other systems reviewed and are negative. Physical Exam   Physical Exam  Vitals and nursing note reviewed. Exam conducted with a chaperone present. Constitutional:       General: She is not in acute distress. Appearance: Normal appearance. She is well-developed. She is not toxic-appearing. HENT:      Head: Normocephalic and atraumatic. Nose: Nose normal.      Mouth/Throat:      Mouth: Mucous membranes are moist.   Eyes:      General: Lids are normal.      Extraocular Movements: Extraocular movements intact. Conjunctiva/sclera: Conjunctivae normal.   Cardiovascular:      Rate and Rhythm: Normal rate and regular rhythm. Pulmonary:      Effort: Pulmonary effort is normal.      Breath sounds: Normal breath sounds. Abdominal:      General: There is no distension. Palpations: Abdomen is soft. Tenderness: There is no abdominal tenderness. There is no guarding. Genitourinary:     Rectum: No tenderness. Normal anal tone. Comments: Several small external hemorrhoids, non-thrombosed. There is a small amount of red blood present on the rectum. Stool on glove appears brown, without obvious blood. Musculoskeletal:         General: Normal range of motion. Cervical back: Normal range of motion and neck supple. Skin:     General: Skin is warm and dry. Neurological:      General: No focal deficit present. Mental Status: She is alert and oriented to person, place, and time.       Gait: Gait normal.   Psychiatric:         Mood and Affect: Mood normal.         Behavior: Behavior normal. Behavior is cooperative. Diagnostic Study Results     Labs -   No results found for this or any previous visit (from the past 12 hour(s)). Radiologic Studies -   No orders to display     CT Results  (Last 48 hours)    None        CXR Results  (Last 48 hours)    None            Medical Decision Making   I am the first provider for this patient. I reviewed the vital signs, available nursing notes, past medical history, past surgical history, family history and social history. Vital Signs-Reviewed the patient's vital signs. Patient Vitals for the past 12 hrs:   Temp Pulse Resp BP SpO2   02/05/22 0944 98 °F (36.7 °C) 74 16 (!) 151/121 98 %       Records Reviewed: Nursing Notes and Old Medical Records    Provider Notes (Medical Decision Making):   Pt is well appearing and hemodynamically stable. Sxs c/w low volume lower GI bleed likely from hemorrhoids visualized on exam, however discussed possibility of other sources of bleeding with the patient. Recommended prompt follow-up with PCP, GI for further evaluation. Can use Anusol topical cream for symptom relief. Recommended oral hydration and staying on top of constipation to prevent worsening hemorrhoid symptoms. ED return precautions given. Patient is in agreement this plan. ED Course:   Initial assessment performed. The patients presenting problems have been discussed, and they are in agreement with the care plan formulated and outlined with them. I have encouraged them to ask questions as they arise throughout their visit. Case discussed with attending, Dr. Reyes Pac Time: None    Disposition:  D/c    PLAN:  1. Current Discharge Medication List      START taking these medications    Details   hydrocortisone (Anusol-HC) 2.5 % rectal cream Insert  into rectum four (4) times daily. Qty: 30 g, Refills: 0  Start date: 2/5/2022           2.    Follow-up Information Follow up With Specialties Details Why [address] [facility] EMERGENCY DEPT Emergency Medicine  As needed, If symptoms worsen [address] Araizashanda Farr MD Family Medicine Call  For follow up [address]       [facility]  Call  For follow up [address]  [address] [facility] [id number]        Return to ED if worse     Diagnosis     Clinical Impression:   1. BRBPR (bright red blood per rectum)    2. Hemorrhoids, unspecified hemorrhoid type          Please note that this dictation was completed with [employer], the computer voice recognition software. Quite often unanticipated grammatical, syntax, homophones, and other interpretive errors are inadvertently transcribed by the computer software. Please disregards these errors. Please excuse any errors that have escaped final proofreading.

## 2022-02-18 ENCOUNTER — PATIENT OUTREACH (OUTPATIENT)
Dept: CASE MANAGEMENT | Age: 87
End: 2022-02-18

## 2022-02-21 NOTE — PROGRESS NOTES
Ambulatory Care Management Note    Date/Time:  2/21/2022 1:18 PM    This patient was received as a referral from Daily assignment. Ambulatory Care Manager outreached to patient today to offer care management services. Introduction to self and role of care manager provided. Patient accepted care management services at this time. Follow up call scheduled at this time. Patient has Ambulatory Care Manager's contact number for for any questions or concerns. Spoke to patient who says she is feeling much better. Still having stools daily and has not seen any blood. Taking prune juice and eating green vegetables to keep from getting constipated.

## 2022-02-28 ENCOUNTER — PATIENT OUTREACH (OUTPATIENT)
Dept: CASE MANAGEMENT | Age: 87
End: 2022-02-28

## 2022-02-28 NOTE — PROGRESS NOTES
Ambulatory Care Management Note    Date/Time:  2/28/2022 10:18 AM    This Ambulatory Care Manager (ACM) reviewed and updated the following screenings during this call; general assessment, disease specific assessment , self management assessment and SDOH assessments    Patient's challenges to self-management identified:   functional physical ability, lack of knowledge about disease, level of motivation, medical condition and PCP relationship      Medication Management:  good adherence and good understanding    Advance Care Planning:   Does patient have an Advance Directive:  yes; reviewed and current     [employer], Referrals, and Durable Medical Equipment:       Health Maintenance Due   Topic Date Due    Shingrix Vaccine Age 50> (1 of 2) Never done    Foot Exam Q1  11/13/2018    Eye Exam Retinal or Dilated  05/14/2021    MICROALBUMIN Q1  05/22/2021    Flu Vaccine (1) Never done     Health Maintenance Reviewed: yes    Patient was asked to consider health care goals that they would like to focus on with this ACM. ACM will follow up with patient to discuss goals and establish care plan in the next 7-14 days.        PCP/Specialist follow up:   Future Appointments   Date Time Provider Vielka Motta   6/20/2022  8:20 AM Lucille Cordova MD [facility] BS AMB

## 2022-03-07 ENCOUNTER — PATIENT OUTREACH (OUTPATIENT)
Dept: CASE MANAGEMENT | Age: 87
End: 2022-03-07

## 2022-03-07 NOTE — PROGRESS NOTES
Ambulatory Care Management Note      Date/Time:  3/9/2022 4:40 PM    Top Challenges reviewed with the provider   · Ed 2/5/22 for hemorrhoids, anal pain, BP was up to 151/121  · HA1C=6.3 on 12/20/22  · Creat=1.92 to follow up with renal  · C/o Rt. Knee pain, L foot swelling, complains of cramps at night at right ankle       Ambulatory  contacted patient for discussion and case management of self care. Summary of patients top problems:   1. HTN- history of HTN. Admits to taking medications as ordered. BP up to 151/121 on Ed admission. 2.  Chronic pain-Complain of rectal pain, and feft foot pain at night. Primary osteoarthritis of right knee  -     TRIAMCINOLONE ACETONIDE INJ-    NC DRAIN/INJECT LARGE JOINT/BURSA  PCP/Specialist follow up:   Future Appointments   Date Time Provider Vielka Motta   6/20/2022  8:20 AM Deonte Mercado MD [facility] BS AMB          Goals      Attends follow up appointments on schedule      03/09/22  Aleksandar Nielson Outreach completed   Reminded of all scheduled appointments   Will attend as scheduled   ACM will follow up in 12-14 days. pmk       Patient/Family verbalizes understanding of self-management of chronic pain      03/09/22   Ordered   Anusol supp for Hemorids and anal pain   Admits to feeling better   Taking all medications as ordered  · Pace yourself. Break up large jobs into smaller tasks. Save harder tasks for days when you have less pain, or go back and forth between hard tasks and easier ones. Take rest breaks. · Relax, and reduce stress. Relaxation techniques such as deep breathing or meditation can help. · Keep moving. Gentle, daily exercise can help reduce pain over the long run. Try low- or no-impact exercises such as walking, swimming, and stationary biking. Do stretches to stay flexible. · Try heat, cold packs, and massage. · Get enough sleep. Chronic pain can make you tired and drain your energy.  Talk with your doctor if you have trouble sleeping because of pain.  ACM will follow up in 12-14 days. pmk             Patient verbalized understanding of all information discussed. Patient has this Nurse Navigators contact information for any further questions, concerns, or needs.

## 2022-03-18 PROBLEM — E66.01 SEVERE OBESITY (HCC): Status: ACTIVE | Noted: 2020-12-11

## 2022-03-19 PROBLEM — N18.30 CHRONIC RENAL INSUFFICIENCY, STAGE III (MODERATE) (HCC): Status: ACTIVE | Noted: 2017-12-06

## 2022-03-19 PROBLEM — E11.21 TYPE 2 DIABETES WITH NEPHROPATHY (HCC): Status: ACTIVE | Noted: 2018-03-15

## 2022-03-23 ENCOUNTER — PATIENT OUTREACH (OUTPATIENT)
Dept: CASE MANAGEMENT | Age: 87
End: 2022-03-23

## 2022-03-24 NOTE — PROGRESS NOTES
03/23/22  Goals Addressed                 This Visit's Progress     Attends follow up appointments on schedule        03/09/22   Outreach completed   Reminded of all scheduled appointments   Will attend as scheduled   ACM will follow up in 12-14 days. Pmk    03/23/22   Attended follow up with [facility]   Will follow up with Ophthalmology on 4/21/22   Will follow up with PCP again on 6/20/22   ACM will follow up again in 12-15 days. pmk       Patient/Family verbalizes understanding of self-management of chronic pain   On track     03/23/22   Ordered   Anusol supp for Hemorrhoids and anal pain   Admits to feeling better   Taking all medications as ordered  · Pace yourself. Break up large jobs into smaller tasks. Save harder tasks for days when you have less pain, or go back and forth between hard tasks and easier ones. Take rest breaks. · Relax, and reduce stress. Relaxation techniques such as deep breathing or meditation can help. · Keep moving. Gentle, daily exercise can help reduce pain over the long run. Try low- or no-impact exercises such as walking, swimming, and stationary biking. Do stretches to stay flexible. · Try heat, cold packs, and massage. · Get enough sleep. Chronic pain can make you tired and drain your energy. Talk with your doctor if you have trouble sleeping because of pain.  ACM will follow up in 12-14 days. Pmk    03/23/22   Admits to feeling better, says doing better with Hemorrhoids and anal pain   Continue to take medication for pain   Will continue to maintain diet with high fiber to prevent constipation   Will continue to drink plenty of fluids   Has been more active with chores around home   ACM will follow up again in 12-14 days .  pmk

## 2022-04-11 ENCOUNTER — PATIENT OUTREACH (OUTPATIENT)
Dept: CASE MANAGEMENT | Age: 87
End: 2022-04-11

## 2022-04-26 ENCOUNTER — PATIENT OUTREACH (OUTPATIENT)
Dept: CASE MANAGEMENT | Age: 87
End: 2022-04-26

## 2022-04-27 NOTE — PROGRESS NOTES
04/26/22   Goals Addressed                 This Visit's Progress     Attends follow up appointments on schedule        03/09/22   Outreach completed   Reminded of all scheduled appointments   Will attend as scheduled   ACM will follow up in 12-14 days. Pmk    03/23/22   Attended follow [facility] with [facility]   Will follow up with Ophthalmology on 4/21/22   Will follow up with PCP again on 6/20/22   ACM will follow up again in 12-15 days. Pmk    4/12/22   Has followed up with scheduled appointments   Reviewed all up coming appointments   No ED visits in 60 days   ACM will follow again in 12-15 days. Pmk    04/26/22   Has followed up as scheduled, Had eye follow up on 4/21/22. Things look good   No ed visits in 60 days   Will Attend appointment on 6/20/22   Will follow up again in 12-14 days. pmk       Patient/Family verbalizes understanding of self-management of chronic pain        03/23/22   Ordered   Anusol supp for Hemorrhoids and anal pain   Admits to feeling better   Taking all medications as ordered  · Pace yourself. Break up large jobs into smaller tasks. Save harder tasks for days when you have less pain, or go back and forth between hard tasks and easier ones. Take rest breaks. · Relax, and reduce stress. Relaxation techniques such as deep breathing or meditation can help. · Keep moving. Gentle, daily exercise can help reduce pain over the long run. Try low- or no-impact exercises such as walking, swimming, and stationary biking. Do stretches to stay flexible. · Try heat, cold packs, and massage. · Get enough sleep. Chronic pain can make you tired and drain your energy. Talk with your doctor if you have trouble sleeping because of pain.  ACM will follow up in 12-14 days.  Pmk      03/23/22   Admits to feeling better, says doing better with Hemorrhoids and anal pain   Continue to take medication for pain   Will continue to maintain diet with high fiber to prevent constipation   Will continue to drink plenty of fluids   Has been more active with chores around home   ACM will follow up again in 12-14 days . Pmk    04/12/22   Outreach completed   Admits to decreased pain   Will continue to take all medications as ordered   Will maintain diet high in fiber to prevent constipation   Will participate in increased daily activities by doing more chores- doing small loads of laundry over next week.  ACM will follow again in 12-14 days. Pmk    04/26/22   Denies Rectal pain, no further bleeding noticed   Maintaining a diet high in fiber to prevent constipation   Will remain hydrated with fluids, denies swelling of extremities   Will continue short walks, Taking all medications as ordered   Educated on the Importance of Supportive resources in place to maintain patient in the community (ie. Home Health, DME equipment, refer to, medication assistant plan, [employer] etc.)   ACM contact information given. Will follow up in 10-14 days.  [address] [place] [address]

## 2022-05-10 ENCOUNTER — PATIENT OUTREACH (OUTPATIENT)
Dept: CASE MANAGEMENT | Age: 87
End: 2022-05-10

## 2022-05-11 NOTE — PROGRESS NOTES
05/10/22   Goals Addressed                 This Visit's Progress     Attends follow up appointments on schedule   On track     03/09/22  Cris Long Outreach completed   Reminded of all scheduled appointments   Will attend as scheduled   ACM will follow up in 12-14 days. Pmk    03/23/22   [facility] with [facility]   Will follow up with Ophthalmology on 4/21/22   Will follow up with PCP again on 6/20/22   ACM will follow up again in 12-15 days. Pmk    4/12/22   Has followed up with scheduled appointments   Reviewed all up coming appointments   No ED visits in 60 days   ACM will follow again in 12-15 days. Pmk    04/26/22   Has followed up as scheduled, Had eye follow up on 4/21/22. Things look good   No ed visits in 60 days   Will Attend appointment on 6/20/22   Will follow up again in 12-14 days. Pmk    05/10/22    Has continued follow ups as scheduled   Reviewed upcoming appointment   Reminded of follow up on 6/20/22   ACM will follow up in 12-14 days. pmk       Patient/Family verbalizes understanding of self-management of chronic pain   On track     03/23/22   Ordered   Anusol supp for Hemorrhoids and anal pain   Admits to feeling better   Taking all medications as ordered  · Pace yourself. Break up large jobs into smaller tasks. Save harder tasks for days when you have less pain, or go back and forth between hard tasks and easier ones. Take rest breaks. · Relax, and reduce stress. Relaxation techniques such as deep breathing or meditation can help. · Keep moving. Gentle, daily exercise can help reduce pain over the long run. Try low- or no-impact exercises such as walking, swimming, and stationary biking. Do stretches to stay flexible. · Try heat, cold packs, and massage. · Get enough sleep. Chronic pain can make you tired and drain your energy. Talk with your doctor if you have trouble sleeping because of pain.  ACM will follow up in 12-14 days.  Pmk      03/23/22   Admits to feeling better, says doing better with Hemorrhoids and anal pain   Continue to take medication for pain   Will continue to maintain diet with high fiber to prevent constipation   Will continue to drink plenty of fluids   Has been more active with chores around home   ACM will follow up again in 12-14 days . Pmk    04/12/22   Outreach completed   Admits to decreased pain   Will continue to take all medications as ordered   Will maintain diet high in fiber to prevent constipation   Will participate in increased daily activities by doing more chores- doing small loads of laundry over next week.  ACM will follow again in 12-14 days. Pmk    04/26/22   Denies Rectal pain, no further bleeding noticed   Maintaining a diet high in fiber to prevent constipation   Will remain hydrated with fluids, denies swelling of extremities   Will continue short walks, Taking all medications as ordered   Educated on the Importance of Supportive resources in place to maintain patient in the community (ie. Home Health, DME equipment, refer to, medication assistant plan, [employer] etc.)   ACM contact information given. Will follow up in 10-14 days. [address] [place] [address]    05/10/22    No Ed visits in 60 days.  Admits to feeling well   Will utilize supportive resources if needed for self care.     ACM will

## 2022-05-29 DIAGNOSIS — I10 ESSENTIAL HYPERTENSION: ICD-10-CM

## 2022-05-30 RX ORDER — LOSARTAN POTASSIUM 25 MG/1
TABLET ORAL
Qty: 90 TABLET | Refills: 3 | Status: SHIPPED | OUTPATIENT
Start: 2022-05-30

## 2022-05-30 RX ORDER — CHLORTHALIDONE 25 MG/1
TABLET ORAL
Qty: 90 TABLET | Refills: 3 | Status: SHIPPED | OUTPATIENT
Start: 2022-05-30

## 2022-05-30 RX ORDER — AMLODIPINE BESYLATE 5 MG/1
TABLET ORAL
Qty: 90 TABLET | Refills: 3 | Status: SHIPPED | OUTPATIENT
Start: 2022-05-30

## 2022-05-30 RX ORDER — ALLOPURINOL 100 MG/1
TABLET ORAL
Qty: 180 TABLET | Refills: 3 | Status: SHIPPED | OUTPATIENT
Start: 2022-05-30

## 2022-05-30 RX ORDER — ATORVASTATIN CALCIUM 20 MG/1
TABLET, FILM COATED ORAL
Qty: 90 TABLET | Refills: 3 | Status: SHIPPED | OUTPATIENT
Start: 2022-05-30

## 2022-06-09 ENCOUNTER — PATIENT OUTREACH (OUTPATIENT)
Dept: CASE MANAGEMENT | Age: 87
End: 2022-06-09

## 2022-06-09 NOTE — PROGRESS NOTES
Patient has graduated from the Complex Case Management  program on 6/9/22. Patient/family has the ability to self-manage at this time. Care management goals have been completed. No further Ambulatory Care Manager follow up scheduled. Goals Addressed                 This Visit's Progress     COMPLETED: Attends follow up appointments on schedule   On track     03/09/22  Oziel Linwood Outreach completed   Reminded of all scheduled appointments   Will attend as scheduled   ACM will follow up in 12-14 days. Pmk    03/23/22   [facility]   Will follow up with Ophthalmology on 4/21/22   Will follow up with PCP again on 6/20/22   ACM will follow up again in 12-15 days. Pmk    4/12/22   Has followed up with scheduled appointments   Reviewed all up coming appointments   No ED visits in 60 days   ACM will follow again in 12-15 days. Pmk    04/26/22   Has followed up as scheduled, Had eye follow up on 4/21/22. Things look good   No ed visits in 60 days   Will Attend appointment on 6/20/22   Will follow up again in 12-14 days. Pmk    05/10/22    Has continued follow ups as scheduled   Reviewed upcoming appointment   Reminded of follow up on 6/20/22   ACM will follow up in 12-14 days. pmk       COMPLETED: Patient/Family verbalizes understanding of self-management of chronic pain   On track     03/23/22   Ordered   Anusol supp for Hemorrhoids and anal pain   Admits to feeling better   Taking all medications as ordered  · Pace yourself. Break up large jobs into smaller tasks. Save harder tasks for days when you have less pain, or go back and forth between hard tasks and easier ones. Take rest breaks. · Relax, and reduce stress. Relaxation techniques such as deep breathing or meditation can help. · Keep moving. Gentle, daily exercise can help reduce pain over the long run. Try low- or no-impact exercises such as walking, swimming, and stationary biking. Do stretches to stay flexible.   · Try heat, cold packs, and massage. · Get enough sleep. Chronic pain can make you tired and drain your energy. Talk with your doctor if you have trouble sleeping because of pain.  ACM will follow up in 12-14 days. Pmk      03/23/22   Admits to feeling better, says doing better with Hemorrhoids and anal pain   Continue to take medication for pain   Will continue to maintain diet with high fiber to prevent constipation   Will continue to drink plenty of fluids   Has been more active with chores around home   ACM will follow up again in 12-14 days . Pmk    04/12/22   Outreach completed   Admits to decreased pain   Will continue to take all medications as ordered   Will maintain diet high in fiber to prevent constipation   Will participate in increased daily activities by doing more chores- doing small loads of laundry over next week.  ACM will follow again in 12-14 days. Pmk    04/26/22   Denies Rectal pain, no further bleeding noticed   Maintaining a diet high in fiber to prevent constipation   Will remain hydrated with fluids, denies swelling of extremities   Will continue short walks, Taking all medications as ordered   Educated on the Importance of Supportive resources in place to maintain patient in the community (ie. Home Health, DME equipment, refer to, medication assistant plan, [facility] etc.)   ACM contact information given. Will follow up in 10-14 days. [address] [place] [address]    05/10/22    No Ed visits in 60 days.  Admits to feeling well   Will utilize supportive resources if needed for self care.  ACM will             Patient has Ambulatory Care Manager's contact information for any further questions, concerns, or needs.   Patients upcoming visits:    Future Appointments   Date Time Provider Vielka Motta   6/20/2022  8:20 AM Telly Sharp MD [facility] BS AMB

## 2022-06-20 ENCOUNTER — OFFICE VISIT (OUTPATIENT)
Dept: FAMILY MEDICINE CLINIC | Age: 87
End: 2022-06-20
Payer: MEDICARE

## 2022-06-20 VITALS
HEIGHT: 60 IN | TEMPERATURE: 97.5 F | SYSTOLIC BLOOD PRESSURE: 129 MMHG | HEART RATE: 57 BPM | BODY MASS INDEX: 35.53 KG/M2 | OXYGEN SATURATION: 98 % | RESPIRATION RATE: 16 BRPM | DIASTOLIC BLOOD PRESSURE: 55 MMHG | WEIGHT: 181 LBS

## 2022-06-20 DIAGNOSIS — Z79.4 CONTROLLED TYPE 2 DIABETES MELLITUS WITH STAGE 3 CHRONIC KIDNEY DISEASE, WITH LONG-TERM CURRENT USE OF INSULIN (HCC): ICD-10-CM

## 2022-06-20 DIAGNOSIS — Z23 ENCOUNTER FOR ADMINISTRATION OF COVID-19 VACCINE: ICD-10-CM

## 2022-06-20 DIAGNOSIS — E78.00 HYPERCHOLESTEROLEMIA: ICD-10-CM

## 2022-06-20 DIAGNOSIS — G56.03 BILATERAL CARPAL TUNNEL SYNDROME: ICD-10-CM

## 2022-06-20 DIAGNOSIS — I10 ESSENTIAL HYPERTENSION: Primary | ICD-10-CM

## 2022-06-20 DIAGNOSIS — M79.10 MYALGIA: ICD-10-CM

## 2022-06-20 DIAGNOSIS — N18.30 CONTROLLED TYPE 2 DIABETES MELLITUS WITH STAGE 3 CHRONIC KIDNEY DISEASE, WITH LONG-TERM CURRENT USE OF INSULIN (HCC): ICD-10-CM

## 2022-06-20 DIAGNOSIS — E11.22 CONTROLLED TYPE 2 DIABETES MELLITUS WITH STAGE 3 CHRONIC KIDNEY DISEASE, WITH LONG-TERM CURRENT USE OF INSULIN (HCC): ICD-10-CM

## 2022-06-20 DIAGNOSIS — R52 PAIN: ICD-10-CM

## 2022-06-20 DIAGNOSIS — M48.062 SPINAL STENOSIS OF LUMBAR REGION WITH NEUROGENIC CLAUDICATION: ICD-10-CM

## 2022-06-20 DIAGNOSIS — E66.01 SEVERE OBESITY (BMI 35.0-39.9) WITH COMORBIDITY (HCC): ICD-10-CM

## 2022-06-20 DIAGNOSIS — N18.4 CKD (CHRONIC KIDNEY DISEASE) STAGE 4, GFR 15-29 ML/MIN (HCC): ICD-10-CM

## 2022-06-20 PROCEDURE — 1090F PRES/ABSN URINE INCON ASSESS: CPT | Performed by: FAMILY MEDICINE

## 2022-06-20 PROCEDURE — G8510 SCR DEP NEG, NO PLAN REQD: HCPCS | Performed by: FAMILY MEDICINE

## 2022-06-20 PROCEDURE — G8427 DOCREV CUR MEDS BY ELIG CLIN: HCPCS | Performed by: FAMILY MEDICINE

## 2022-06-20 PROCEDURE — G8417 CALC BMI ABV UP PARAM F/U: HCPCS | Performed by: FAMILY MEDICINE

## 2022-06-20 PROCEDURE — 91305 COVID-19, PFIZER GRAY TOP, DO NOT DILUTE, TRIS-SUCROSE, (AGE 12 YRS+), PF, 30MCG/0.3 ML: CPT | Performed by: FAMILY MEDICINE

## 2022-06-20 PROCEDURE — G8536 NO DOC ELDER MAL SCRN: HCPCS | Performed by: FAMILY MEDICINE

## 2022-06-20 PROCEDURE — 1123F ACP DISCUSS/DSCN MKR DOCD: CPT | Performed by: FAMILY MEDICINE

## 2022-06-20 PROCEDURE — 1101F PT FALLS ASSESS-DOCD LE1/YR: CPT | Performed by: FAMILY MEDICINE

## 2022-06-20 PROCEDURE — 99214 OFFICE O/P EST MOD 30 MIN: CPT | Performed by: FAMILY MEDICINE

## 2022-06-20 PROCEDURE — G0463 HOSPITAL OUTPT CLINIC VISIT: HCPCS | Performed by: FAMILY MEDICINE

## 2022-06-20 RX ORDER — ACETAMINOPHEN AND CODEINE PHOSPHATE 300; 30 MG/1; MG/1
1 TABLET ORAL
Qty: 90 TABLET | Refills: 2 | Status: SHIPPED | OUTPATIENT
Start: 2022-06-20 | End: 2022-07-20

## 2022-06-20 RX ORDER — ACETAMINOPHEN 500 MG
1000 TABLET ORAL
Qty: 60 TABLET | Refills: 2 | Status: SHIPPED | OUTPATIENT
Start: 2022-06-20

## 2022-06-20 NOTE — PROGRESS NOTES
Chief Complaint   Patient presents with    Diabetes    Cholesterol Problem    Hypertension    Follow-up       1. \"Have you been to the ER, urgent care clinic since your last visit? Hospitalized since your last visit? \" No    2. \"Have you seen or consulted any other health care providers outside of the [address] [place] [address] since your last visit? \" No     3. For patients aged 39-70: Has the patient had a colonoscopy / FIT/ Cologuard? N/A      If the patient is female:    4. For patients aged 41-77: Has the patient had a mammogram within the past 2 years? N/A      5. For patients aged 21-65: Has the patient had a pap smear? NA - based on age or sex    Health Maintenance Due   Topic Date Due    Shingrix Vaccine Age 49> (1 of 2) Never done    Pneumococcal 65+ years (2 - PCV) 09/01/2012    Foot Exam Q1  11/13/2018    Eye Exam Retinal or Dilated  05/14/2021    MICROALBUMIN Q1  05/22/2021     Verbal Order received from Dr. Damari Espitia  for Ariana [id number]  given IM  in left  arm.

## 2022-06-20 NOTE — PROGRESS NOTES
HISTORY OF PRESENT ILLNESS  Jake Campbell is a 80 y.o. female. HPI Pt. Comes in for blood pressure, cholesterol, and diabetes check. No complaints of chest pain, shortness of breath, TIAs, claudication or edema. Co bilateral leg pains at times, and also gets cramps in legs at times. Also gets tingling in both hands. Symptoms can wake her up from sleep at night. ROS    Physical Exam  Vitals and nursing note reviewed. Constitutional:       Appearance: She is well-developed. HENT:      Right Ear: External ear normal.      Left Ear: External ear normal.   Neck:      Thyroid: No thyromegaly. Cardiovascular:      Rate and Rhythm: Normal rate and regular rhythm. Heart sounds: Normal heart sounds. Pulmonary:      Breath sounds: Normal breath sounds. No wheezing. Abdominal:      General: Bowel sounds are normal. There is no distension. Palpations: Abdomen is soft. There is no mass. Tenderness: There is no abdominal tenderness. Musculoskeletal:      Comments: Hands- negative phalens and tinels signs. Sensation fingers intact . Thenar eminence atrophy bilaterally  Legs- positive SLR bilaterally. Hips- full rom. Knees- full rom   Lymphadenopathy:      Cervical: No cervical adenopathy. ASSESSMENT and PLAN  Orders Placed This Encounter    COVID-19, Pfizer [employer], DO NOT Dilute, Adelina-Sucrose, 12+ yrs, PF, 30mcg/0.3 mL dose    CBC WITH AUTOMATED DIFF    METABOLIC PANEL, COMPREHENSIVE    HEMOGLOBIN A1C WITH EAG    CK    acetaminophen (TYLENOL) 500 mg tablet    acetaminophen-codeine (TYLENOL #3) 300-30 mg per tablet     Diagnoses and all orders for this visit:    1. Essential hypertension  -     CBC WITH AUTOMATED DIFF; Future  -     METABOLIC PANEL, COMPREHENSIVE; Future    2. Encounter for administration of COVID-19 vaccine  -     COVID-19, PFIZER GRAY TOP, DO NOT DILUTE, ADELINA-SUCROSE, (AGE 12 YRS+), PF, 30MCG/0.3 ML    3.  Controlled type 2 diabetes mellitus with stage 3 chronic kidney disease, with long-term current use of insulin ([facility])  -     HEMOGLOBIN A1C WITH EAG; Future    4. Hypercholesterolemia    5. Severe obesity (BMI 35.0-39. 9) with comorbidity ([address]    6. CKD (chronic kidney disease) stage 4, GFR 15-29 ml/min ([facility])    7. Myalgia  -     CK; Future    8. Pain  -     acetaminophen-codeine (TYLENOL #3) 300-30 mg per tablet; Take 1 Tablet by mouth every six (6) hours as needed for Pain for up to 30 days. Max Daily Amount: 4 Tablets. 9. Bilateral carpal tunnel syndrome    10. Spinal stenosis of lumbar region with neurogenic claudication    Other orders  -     acetaminophen (TYLENOL) 500 mg tablet; Take 2 Tablets by mouth every eight (8) hours as needed for Pain. Follow-up and Dispositions    · Return in about 6 months (around 12/20/2022). Offered referal for carpal tunnel.  Pt wants to try carpal tunnel splints first. Says that symptoms arent bad enough to see hand surgery

## 2022-06-21 LAB
ALBUMIN SERPL-MCNC: 3.9 G/DL (ref 3.5–5)
ALBUMIN/GLOB SERPL: 1 {RATIO} (ref 1.1–2.2)
ALP SERPL-CCNC: 110 U/L (ref 45–117)
ALT SERPL-CCNC: 20 U/L (ref 12–78)
ANION GAP SERPL CALC-SCNC: 6 MMOL/L (ref 5–15)
AST SERPL-CCNC: 22 U/L (ref 15–37)
BASOPHILS # BLD: 0 K/UL (ref 0–0.1)
BASOPHILS NFR BLD: 0 % (ref 0–1)
BILIRUB SERPL-MCNC: 0.8 MG/DL (ref 0.2–1)
BUN SERPL-MCNC: 28 MG/DL (ref 6–20)
BUN/CREAT SERPL: 17 (ref 12–20)
CALCIUM SERPL-MCNC: 10.1 MG/DL (ref 8.5–10.1)
CHLORIDE SERPL-SCNC: 106 MMOL/L (ref 97–108)
CK SERPL-CCNC: 239 U/L (ref 26–192)
CO2 SERPL-SCNC: 28 MMOL/L (ref 21–32)
CREAT SERPL-MCNC: 1.69 MG/DL (ref 0.55–1.02)
DIFFERENTIAL METHOD BLD: ABNORMAL
EOSINOPHIL # BLD: 0.2 K/UL (ref 0–0.4)
EOSINOPHIL NFR BLD: 3 % (ref 0–7)
ERYTHROCYTE [DISTWIDTH] IN BLOOD BY AUTOMATED COUNT: 16.6 % (ref 11.5–14.5)
EST. AVERAGE GLUCOSE BLD GHB EST-MCNC: 134 MG/DL
GLOBULIN SER CALC-MCNC: 3.8 G/DL (ref 2–4)
GLUCOSE SERPL-MCNC: 88 MG/DL (ref 65–100)
HBA1C MFR BLD: 6.3 % (ref 4–5.6)
HCT VFR BLD AUTO: 40.2 % (ref 35–47)
HGB BLD-MCNC: 12.6 G/DL (ref 11.5–16)
IMM GRANULOCYTES # BLD AUTO: 0 K/UL (ref 0–0.04)
IMM GRANULOCYTES NFR BLD AUTO: 0 % (ref 0–0.5)
LYMPHOCYTES # BLD: 3 K/UL (ref 0.8–3.5)
LYMPHOCYTES NFR BLD: 44 % (ref 12–49)
MCH RBC QN AUTO: 30.1 PG (ref 26–34)
MCHC RBC AUTO-ENTMCNC: 31.3 G/DL (ref 30–36.5)
MCV RBC AUTO: 96.2 FL (ref 80–99)
MONOCYTES # BLD: 0.5 K/UL (ref 0–1)
MONOCYTES NFR BLD: 7 % (ref 5–13)
NEUTS SEG # BLD: 3.1 K/UL (ref 1.8–8)
NEUTS SEG NFR BLD: 46 % (ref 32–75)
NRBC # BLD: 0 K/UL (ref 0–0.01)
NRBC BLD-RTO: 0 PER 100 WBC
PLATELET # BLD AUTO: 279 K/UL (ref 150–400)
PMV BLD AUTO: 11.2 FL (ref 8.9–12.9)
POTASSIUM SERPL-SCNC: 4.3 MMOL/L (ref 3.5–5.1)
PROT SERPL-MCNC: 7.7 G/DL (ref 6.4–8.2)
RBC # BLD AUTO: 4.18 M/UL (ref 3.8–5.2)
SODIUM SERPL-SCNC: 140 MMOL/L (ref 136–145)
WBC # BLD AUTO: 6.8 K/UL (ref 3.6–11)

## 2022-07-13 ENCOUNTER — DOCUMENTATION ONLY (OUTPATIENT)
Dept: FAMILY MEDICINE CLINIC | Age: 87
End: 2022-07-13

## 2022-11-21 RX ORDER — ACETAMINOPHEN 500 MG
1000 TABLET ORAL
Qty: 60 TABLET | Refills: 2 | Status: SHIPPED | OUTPATIENT
Start: 2022-11-21

## 2022-12-06 ENCOUNTER — OFFICE VISIT (OUTPATIENT)
Dept: FAMILY MEDICINE CLINIC | Age: 87
End: 2022-12-06
Payer: MEDICARE

## 2022-12-06 VITALS
HEIGHT: 60 IN | BODY MASS INDEX: 35.73 KG/M2 | TEMPERATURE: 98 F | RESPIRATION RATE: 16 BRPM | OXYGEN SATURATION: 93 % | DIASTOLIC BLOOD PRESSURE: 67 MMHG | HEART RATE: 57 BPM | WEIGHT: 182 LBS | SYSTOLIC BLOOD PRESSURE: 117 MMHG

## 2022-12-06 DIAGNOSIS — Z23 NEEDS FLU SHOT: ICD-10-CM

## 2022-12-06 DIAGNOSIS — E78.00 HYPERCHOLESTEROLEMIA: ICD-10-CM

## 2022-12-06 DIAGNOSIS — I10 ESSENTIAL HYPERTENSION: ICD-10-CM

## 2022-12-06 DIAGNOSIS — Z00.00 ENCOUNTER FOR MEDICARE ANNUAL WELLNESS EXAM: Primary | ICD-10-CM

## 2022-12-06 DIAGNOSIS — E11.22 CONTROLLED TYPE 2 DIABETES MELLITUS WITH STAGE 3 CHRONIC KIDNEY DISEASE, WITH LONG-TERM CURRENT USE OF INSULIN (HCC): ICD-10-CM

## 2022-12-06 DIAGNOSIS — Z79.4 CONTROLLED TYPE 2 DIABETES MELLITUS WITH STAGE 3 CHRONIC KIDNEY DISEASE, WITH LONG-TERM CURRENT USE OF INSULIN (HCC): ICD-10-CM

## 2022-12-06 DIAGNOSIS — N18.30 CONTROLLED TYPE 2 DIABETES MELLITUS WITH STAGE 3 CHRONIC KIDNEY DISEASE, WITH LONG-TERM CURRENT USE OF INSULIN (HCC): ICD-10-CM

## 2022-12-06 RX ORDER — ACETAMINOPHEN AND CODEINE PHOSPHATE 300; 30 MG/1; MG/1
TABLET ORAL
COMMUNITY

## 2022-12-06 NOTE — PROGRESS NOTES
Chief Complaint   Patient presents with    Annual Wellness Visit       1. \"Have you been to the ER, urgent care clinic since your last visit? Hospitalized since your last visit? \" No    2. \"Have you seen or consulted any other health care providers outside of the [address] [place] [address] since your last visit? \" Yes Opthamology - [facility] and Nephrology - Dr. Eldon Lazo      3. For patients aged 39-70: Has the patient had a colonoscopy / FIT/ Cologuard? NA - based on age      If the patient is female:    4. For patients aged 41-77: Has the patient had a mammogram within the past 2 years? NA - based on age or sex      11. For patients aged 21-65: Has the patient had a pap smear? NA - based on age or sex    Health Maintenance Due   Topic Date Due    Shingrix Vaccine Age 49> (1 of 2) Never done    Foot Exam Q1  11/13/2018    Eye Exam Retinal or Dilated  05/14/2021    MICROALBUMIN Q1  05/22/2021    Flu Vaccine (1) Never done    COVID-19 Vaccine (5 - Booster for Pfizer series) 08/15/2022    Lipid Screen  12/20/2022    Medicare Yearly Exam  12/21/2022     Verbal Order received from Dr. Lincoln Pacheco  for fluad given IM  in left arm.

## 2022-12-06 NOTE — PROGRESS NOTES
HISTORY OF PRESENT ILLNESS  Mena Brooks is a 80 y.o. female. HPI Pt. Comes in for blood pressure, cholesterol, and diabetes check. No complaints of chest pain, shortness of breath, TIAs, claudication or edema. Has been having L shoulder pain for 3-4 months. TYl #3- mild relief. Pain is worse at night. Blood sugars have been good, 90s-100. NO hypoglycemic episodes. Needs medicare wellness exam. Sees Dr. Delphine Franklin), Dr. Jayden Durbin (renal), ophthalmology (Dr. Radha Jackson). Needs flu shot, declines covid booster. Would want daughter Jose Reyes to be her mPOA if she became incapacitated. Review of Systems   HENT:  Negative for hearing loss. Neurological:         No falls. Uses a cane at home. Independent in all adls. Memory fair. Psychiatric/Behavioral:  Negative for depression. No alcohol     Physical Exam  Vitals and nursing note reviewed. Constitutional:       Appearance: She is well-developed. HENT:      Right Ear: External ear normal.      Left Ear: External ear normal.   Neck:      Thyroid: No thyromegaly. Cardiovascular:      Rate and Rhythm: Normal rate and regular rhythm. Heart sounds: Normal heart sounds. Pulmonary:      Breath sounds: Normal breath sounds. No wheezing. Abdominal:      General: Bowel sounds are normal. There is no distension. Palpations: Abdomen is soft. There is no mass. Tenderness: There is no abdominal tenderness. Musculoskeletal:      Comments: L shoulder- marked decrease in rom   Lymphadenopathy:      Cervical: No cervical adenopathy. ASSESSMENT and PLAN  Orders Placed This Encounter    Influenza, FLUAD, (age 72 y+), IM, PF, 0.5 mL    CBC WITH AUTOMATED DIFF    METABOLIC PANEL, COMPREHENSIVE    LIPID PANEL    HEMOGLOBIN A1C WITH EAG    glucose blood VI test strips strip     Diagnoses and all orders for this visit:    1. Encounter for Medicare annual wellness exam    2.  Needs flu shot  -     INFLUENZA, FLUAD, (AGE 65 Y+), IM, PF, 0.5 ML    3. Essential hypertension  -     CBC WITH AUTOMATED DIFF; Future  -     METABOLIC PANEL, COMPREHENSIVE; Future    4. Controlled type 2 diabetes mellitus with stage 3 chronic kidney disease, with long-term current use of insulin ([facility])  -     HEMOGLOBIN A1C WITH EAG; Future    5. Hypercholesterolemia  -     LIPID PANEL; Future    Other orders  -     glucose blood VI test strips strip; Check blood sugar daily      Follow-up and Dispositions    Return in about 6 months (around 6/6/2023).

## 2022-12-06 NOTE — PATIENT INSTRUCTIONS
Vaccine Information Statement    Influenza (Flu) Vaccine (Inactivated or Recombinant): What You Need to Know    Many vaccine information statements are available in Sinhala and other languages. See www.immunize.org/vis. Hojas de información sobre vacunas están disponibles en español y en muchos otros idiomas. Visite www.immunize.org/vis. 1. Why get vaccinated? Influenza vaccine can prevent influenza (flu). Flu is a contagious disease that spreads around the [facility] every year, usually between October and May. Anyone can get the flu, but it is more dangerous for some people. Infants and young children, people 72 years and older, pregnant people, and people with certain health conditions or a weakened immune system are at greatest risk of flu complications. Pneumonia, bronchitis, sinus infections, and ear infections are examples of flu-related complications. If you have a medical condition, such as heart disease, cancer, or diabetes, flu can make it worse. Flu can cause fever and chills, sore throat, muscle aches, fatigue, cough, headache, and runny or stuffy nose. Some people may have vomiting and diarrhea, though this is more common in children than adults. In an average year, thousands of people in the [facility] die from flu, and many more are hospitalized. Flu vaccine prevents millions of illnesses and flu-related visits to the doctor each year. 2. Influenza vaccines     CDC recommends everyone 6 months and older get vaccinated every flu season. Children 6 months through 6years of age may need 2 doses during a single flu season. Everyone else needs only 1 dose each flu season. It takes about 2 weeks for protection to develop after vaccination. There are many flu viruses, and they are always changing. Each year a new flu vaccine is made to protect against the influenza viruses believed to be likely to cause disease in the upcoming flu season.  Even when the vaccine doesnt exactly match these viruses, it may still provide some protection. Influenza vaccine does not cause flu. Influenza vaccine may be given at the same time as other vaccines. 3. Talk with your health care provider    Tell your vaccination provider if the person getting the vaccine:  Has had an allergic reaction after a previous dose of influenza vaccine, or has any severe, life-threatening allergies   Has ever had Guillain-Barré Syndrome (also called GBS)    In some cases, your health care provider may decide to postpone influenza vaccination until a future visit. Influenza vaccine can be administered at any time during pregnancy. People who are or will be pregnant during influenza season should receive inactivated influenza vaccine. People with minor illnesses, such as a cold, may be vaccinated. People who are moderately or severely ill should usually wait until they recover before getting influenza vaccine. Your health care provider can give you more information. 4. Risks of a vaccine reaction    Soreness, redness, and swelling where the shot is given, fever, muscle aches, and headache can happen after influenza vaccination. There may be a very small increased risk of Guillain-Barré Syndrome (GBS) after inactivated influenza vaccine (the flu shot). Asael Rodriguez children who get the flu shot along with pneumococcal vaccine (PCV13) and/or DTaP vaccine at the same time might be slightly more likely to have a seizure caused by fever. Tell your health care provider if a child who is getting flu vaccine has ever had a seizure. People sometimes faint after medical procedures, including vaccination. Tell your provider if you feel dizzy or have vision changes or ringing in the ears. As with any medicine, there is a very remote chance of a vaccine causing a severe allergic reaction, other serious injury, or death. 5. What if there is a serious problem?     An allergic reaction could occur after the vaccinated person leaves the clinic. If you see signs of a severe allergic reaction (hives, swelling of the face and throat, difficulty breathing, a fast heartbeat, dizziness, or weakness), call 9-1-1 and get the person to the nearest hospital.    For other signs that concern you, call your health care provider. Adverse reactions should be reported to the Vaccine Adverse Event Reporting System (VAERS). Your health care provider will usually file this report, or you can do it yourself. Visit the VAERS website at www.vaers. [facility].gov or call 4-291.485.7370. VAERS is only for reporting reactions, and VAERS staff members do not give medical advice. 6. The National Vaccine Injury Compensation Program    The [facility] Vaccine Injury Compensation Program (VICP) is a federal program that was created to compensate people who may have been injured by certain vaccines. Claims regarding alleged injury or death due to vaccination have a time limit for filing, which may be as short as two years. Visit the VICP website at www.[facility].gov/vaccinecompensation or call 6-154.988.4100 to learn about the program and about filing a claim. 7. How can I learn more? Ask your health care provider. Call your local or state health department. Visit the website of the Food and Drug Administration (FDA) for vaccine package inserts and additional information at www.fda.gov/vaccines-blood-biologics/vaccines. Contact the Centers for Disease Control and Prevention (CDC): Call 5-771.194.1892 (1-800-CDC-INFO) or  Visit CDCs influenza website at www.cdc.gov/flu. Vaccine Information Statement   Inactivated Influenza Vaccine   8/6/2021  42 YOEL Lockett Minus 286CB-90   Department of Health and Human Services  Centers for Disease Control and Prevention    Office Use Only

## 2022-12-07 LAB
ALBUMIN SERPL-MCNC: 3.9 G/DL (ref 3.5–5)
ALBUMIN/GLOB SERPL: 1 {RATIO} (ref 1.1–2.2)
ALP SERPL-CCNC: 111 U/L (ref 45–117)
ALT SERPL-CCNC: 25 U/L (ref 12–78)
ANION GAP SERPL CALC-SCNC: 5 MMOL/L (ref 5–15)
AST SERPL-CCNC: 25 U/L (ref 15–37)
BASOPHILS # BLD: 0 K/UL (ref 0–0.1)
BASOPHILS NFR BLD: 1 % (ref 0–1)
BILIRUB SERPL-MCNC: 0.6 MG/DL (ref 0.2–1)
BUN SERPL-MCNC: 27 MG/DL (ref 6–20)
BUN/CREAT SERPL: 16 (ref 12–20)
CALCIUM SERPL-MCNC: 9.7 MG/DL (ref 8.5–10.1)
CHLORIDE SERPL-SCNC: 108 MMOL/L (ref 97–108)
CHOLEST SERPL-MCNC: 130 MG/DL
CO2 SERPL-SCNC: 30 MMOL/L (ref 21–32)
CREAT SERPL-MCNC: 1.7 MG/DL (ref 0.55–1.02)
DIFFERENTIAL METHOD BLD: ABNORMAL
EOSINOPHIL # BLD: 0.2 K/UL (ref 0–0.4)
EOSINOPHIL NFR BLD: 3 % (ref 0–7)
ERYTHROCYTE [DISTWIDTH] IN BLOOD BY AUTOMATED COUNT: 16.1 % (ref 11.5–14.5)
EST. AVERAGE GLUCOSE BLD GHB EST-MCNC: 131 MG/DL
GLOBULIN SER CALC-MCNC: 3.9 G/DL (ref 2–4)
GLUCOSE SERPL-MCNC: 104 MG/DL (ref 65–100)
HBA1C MFR BLD: 6.2 % (ref 4–5.6)
HCT VFR BLD AUTO: 40 % (ref 35–47)
HDLC SERPL-MCNC: 51 MG/DL
HDLC SERPL: 2.5 {RATIO} (ref 0–5)
HGB BLD-MCNC: 12.3 G/DL (ref 11.5–16)
IMM GRANULOCYTES # BLD AUTO: 0 K/UL (ref 0–0.04)
IMM GRANULOCYTES NFR BLD AUTO: 0 % (ref 0–0.5)
LDLC SERPL CALC-MCNC: 60.4 MG/DL (ref 0–100)
LYMPHOCYTES # BLD: 2.9 K/UL (ref 0.8–3.5)
LYMPHOCYTES NFR BLD: 45 % (ref 12–49)
MCH RBC QN AUTO: 29.5 PG (ref 26–34)
MCHC RBC AUTO-ENTMCNC: 30.8 G/DL (ref 30–36.5)
MCV RBC AUTO: 95.9 FL (ref 80–99)
MONOCYTES # BLD: 0.6 K/UL (ref 0–1)
MONOCYTES NFR BLD: 9 % (ref 5–13)
NEUTS SEG # BLD: 2.7 K/UL (ref 1.8–8)
NEUTS SEG NFR BLD: 42 % (ref 32–75)
NRBC # BLD: 0 K/UL (ref 0–0.01)
NRBC BLD-RTO: 0 PER 100 WBC
PLATELET # BLD AUTO: 265 K/UL (ref 150–400)
PMV BLD AUTO: 11 FL (ref 8.9–12.9)
POTASSIUM SERPL-SCNC: 4.4 MMOL/L (ref 3.5–5.1)
PROT SERPL-MCNC: 7.8 G/DL (ref 6.4–8.2)
RBC # BLD AUTO: 4.17 M/UL (ref 3.8–5.2)
SODIUM SERPL-SCNC: 143 MMOL/L (ref 136–145)
TRIGL SERPL-MCNC: 93 MG/DL (ref ?–150)
VLDLC SERPL CALC-MCNC: 18.6 MG/DL
WBC # BLD AUTO: 6.4 K/UL (ref 3.6–11)

## 2023-02-08 ENCOUNTER — OFFICE VISIT (OUTPATIENT)
Dept: FAMILY MEDICINE CLINIC | Age: 88
End: 2023-02-08
Payer: MEDICARE

## 2023-02-08 VITALS
HEART RATE: 60 BPM | SYSTOLIC BLOOD PRESSURE: 108 MMHG | HEIGHT: 60 IN | BODY MASS INDEX: 34.87 KG/M2 | WEIGHT: 177.6 LBS | TEMPERATURE: 98.7 F | OXYGEN SATURATION: 96 % | RESPIRATION RATE: 16 BRPM | DIASTOLIC BLOOD PRESSURE: 49 MMHG

## 2023-02-08 DIAGNOSIS — M25.512 CHRONIC LEFT SHOULDER PAIN: Primary | ICD-10-CM

## 2023-02-08 DIAGNOSIS — Z79.4 CONTROLLED TYPE 2 DIABETES MELLITUS WITH STAGE 3 CHRONIC KIDNEY DISEASE, WITH LONG-TERM CURRENT USE OF INSULIN (HCC): ICD-10-CM

## 2023-02-08 DIAGNOSIS — R52 PAIN, UNSPECIFIED: ICD-10-CM

## 2023-02-08 DIAGNOSIS — N18.4 CKD (CHRONIC KIDNEY DISEASE) STAGE 4, GFR 15-29 ML/MIN (HCC): ICD-10-CM

## 2023-02-08 DIAGNOSIS — E11.22 CONTROLLED TYPE 2 DIABETES MELLITUS WITH STAGE 3 CHRONIC KIDNEY DISEASE, WITH LONG-TERM CURRENT USE OF INSULIN (HCC): ICD-10-CM

## 2023-02-08 DIAGNOSIS — N18.30 CONTROLLED TYPE 2 DIABETES MELLITUS WITH STAGE 3 CHRONIC KIDNEY DISEASE, WITH LONG-TERM CURRENT USE OF INSULIN (HCC): ICD-10-CM

## 2023-02-08 DIAGNOSIS — M75.82 ROTATOR CUFF TENDINITIS, LEFT: ICD-10-CM

## 2023-02-08 DIAGNOSIS — G89.29 CHRONIC LEFT SHOULDER PAIN: Primary | ICD-10-CM

## 2023-02-08 PROCEDURE — G8427 DOCREV CUR MEDS BY ELIG CLIN: HCPCS | Performed by: FAMILY MEDICINE

## 2023-02-08 PROCEDURE — G8432 DEP SCR NOT DOC, RNG: HCPCS | Performed by: FAMILY MEDICINE

## 2023-02-08 PROCEDURE — G8417 CALC BMI ABV UP PARAM F/U: HCPCS | Performed by: FAMILY MEDICINE

## 2023-02-08 PROCEDURE — 1090F PRES/ABSN URINE INCON ASSESS: CPT | Performed by: FAMILY MEDICINE

## 2023-02-08 PROCEDURE — 1123F ACP DISCUSS/DSCN MKR DOCD: CPT | Performed by: FAMILY MEDICINE

## 2023-02-08 PROCEDURE — 1101F PT FALLS ASSESS-DOCD LE1/YR: CPT | Performed by: FAMILY MEDICINE

## 2023-02-08 PROCEDURE — G0463 HOSPITAL OUTPT CLINIC VISIT: HCPCS | Performed by: FAMILY MEDICINE

## 2023-02-08 PROCEDURE — G8536 NO DOC ELDER MAL SCRN: HCPCS | Performed by: FAMILY MEDICINE

## 2023-02-08 PROCEDURE — 20605 DRAIN/INJ JOINT/BURSA W/O US: CPT | Performed by: FAMILY MEDICINE

## 2023-02-08 PROCEDURE — 99213 OFFICE O/P EST LOW 20 MIN: CPT | Performed by: FAMILY MEDICINE

## 2023-02-08 RX ORDER — TRIAMCINOLONE ACETONIDE 40 MG/ML
40 INJECTION, SUSPENSION INTRA-ARTICULAR; INTRAMUSCULAR ONCE
Qty: 1 ML | Refills: 0
Start: 2023-02-08 | End: 2023-02-08

## 2023-02-08 RX ORDER — ACETAMINOPHEN AND CODEINE PHOSPHATE 300; 30 MG/1; MG/1
1 TABLET ORAL
Qty: 60 TABLET | Refills: 3 | Status: SHIPPED | OUTPATIENT
Start: 2023-02-08 | End: 2023-02-08 | Stop reason: SDUPTHER

## 2023-02-08 RX ORDER — ACETAMINOPHEN AND CODEINE PHOSPHATE 300; 30 MG/1; MG/1
1 TABLET ORAL
Qty: 60 TABLET | Refills: 3 | Status: SHIPPED | OUTPATIENT
Start: 2023-02-08 | End: 2023-03-10

## 2023-02-08 NOTE — PROGRESS NOTES
HISTORY OF PRESENT ILLNESS  Luba Tijerina is a 80 y.o. female. HPI Still having L arm pain for several months. Pain is worse when tries to use it. Some nighttime pain. Tyl #3- some relief. ROS    Physical Exam  Vitals and nursing note reviewed. Constitutional:       Appearance: She is well-developed. HENT:      Right Ear: External ear normal.      Left Ear: External ear normal.   Neck:      Thyroid: No thyromegaly. Cardiovascular:      Rate and Rhythm: Normal rate and regular rhythm. Heart sounds: Normal heart sounds. Pulmonary:      Breath sounds: Normal breath sounds. No wheezing. Abdominal:      General: Bowel sounds are normal. There is no distension. Palpations: Abdomen is soft. There is no mass. Tenderness: There is no abdominal tenderness. Musculoskeletal:      Comments: Neck- full rom  L arm- positive impingement sign   Lymphadenopathy:      Cervical: No cervical adenopathy. ASSESSMENT and PLAN  Orders Placed This Encounter    XR SHOULDER LT AP/LAT MIN 2 V    TRIAMCINOLONE ACETONIDE INJ    20605 - DRAIN/INJECT INTERMEDIATE JOINT/BURSA    DISCONTD: acetaminophen-codeine (TYLENOL #3) 300-30 mg per tablet    acetaminophen-codeine (TYLENOL #3) 300-30 mg per tablet    triamcinolone acetonide (Kenalog) 40 mg/mL injection     Diagnoses and all orders for this visit:    1. Chronic left shoulder pain  -     XR SHOULDER LT AP/LAT MIN 2 V; Future    2. Controlled type 2 diabetes mellitus with stage 3 chronic kidney disease, with long-term current use of insulin ([facility] [address]    3. CKD (chronic kidney disease) stage 4, GFR 15-29 ml/min ([facility])    4. Pain, unspecified  -     acetaminophen-codeine (TYLENOL #3) 300-30 mg per tablet; Take 1 Tablet by mouth every six (6) hours as needed for Pain for up to 30 days. Max Daily Amount: 4 Tablets.     5. Rotator cuff tendinitis, left  -     TRIAMCINOLONE ACETONIDE INJ  -     triamcinolone acetonide (Kenalog) 40 mg/mL injection; 1 mL by IntraBURSal route once for 1 dose.   -     TN ARTHROCENTESIS ASPIR&/INJ INTERM JT/BURS W/O US

## 2023-02-08 NOTE — PROGRESS NOTES
Chief Complaint   Patient presents with    Shoulder Pain     left       1. \"Have you been to the ER, urgent care clinic since your last visit? Hospitalized since your last visit? \" No    2. \"Have you seen or consulted any other health care providers outside of the [address] [place] [address] since your last visit? \" No     3. For patients aged 39-70: Has the patient had a colonoscopy / FIT/ Cologuard? NA - based on age      If the patient is female:    4. For patients aged 41-77: Has the patient had a mammogram within the past 2 years? NA - based on age or sex      11. For patients aged 21-65: Has the patient had a pap smear? NA - based on age or sex    Health Maintenance Due   Topic Date Due    Shingles Vaccine (1 of 2) Never done    COVID-19 Vaccine (5 - Booster for [employer] series) 08/15/2022     [address]  OFFICE PROCEDURE PROGRESS NOTE        Chart reviewed for the following:   Cele Parham LPN, have reviewed the History, Physical and updated the Allergic reactions for [address] performed immediately prior to start of procedure:   Cele Parham LPN, have performed the following reviews on [place] [facility] [address] prior to the start of the procedure:            * Patient was identified by name and date of birth   * Agreement on procedure being performed was verified  * Risks and Benefits explained to the patient  * Procedure site verified and marked as necessary  * Patient was positioned for comfort  * Consent was signed and verified     Time: 4:30 p.m.       Date of procedure: 2/8/2023    Procedure performed by:  Isaac Reina MD    Provider assisted by: Gigi Rose LPN    Patient assisted by: self    How tolerated by patient: tolerated the procedure well with no complications    Post Procedural Pain Scale: 2 - Hurts Little Bit    Comments: none, Procedure reviewed with patient by provider verified by nurse and consent signed for Triamcinolone 40 mg into left shoulder. Harden Beech

## 2023-05-22 ENCOUNTER — NURSE TRIAGE (OUTPATIENT)
Dept: OTHER | Facility: CLINIC | Age: 88
End: 2023-05-22

## 2023-05-22 NOTE — TELEPHONE ENCOUNTER
Location of patient: [address] [place] from [place] at [facility] with [employer]. Subjective: Caller states \"right knee and leg\"     Current Symptoms: right knee pain        Onset: 1 week ago; worsening    Associated Symptoms: reduced activity, increased wakefulness    Pain Severity: unable to assess as patient not available     Temperature: unknown    What has been tried: patch and some cream(diclofenac?0    LMP: NA Pregnant: NA      Limited triage due to patient not present for triage. Stated patient was getting dressed and not able to contact. Recommended disposition: See in Office Today    Care advice provided, patient verbalizes understanding; denies any other questions or concerns; instructed to call back for any new or worsening symptoms. Patient/Caller agrees with recommended disposition; writer provided warm transfer to [employer] at [facility] for appointment scheduling    Attention Provider: Thank you for allowing me to participate in the care of your patient. The patient was connected to triage in response to information provided to the ECC/PSC. Please do not respond through this encounter as the response is not directed to a shared pool.       Reason for Disposition   SEVERE pain (e.g., excruciating, unable to walk)    Protocols used: Knee Pain-ADULT-[place]

## 2023-06-26 ENCOUNTER — OFFICE VISIT (OUTPATIENT)
Age: 88
End: 2023-06-26
Payer: MEDICARE

## 2023-06-26 VITALS
HEART RATE: 55 BPM | OXYGEN SATURATION: 95 % | WEIGHT: 176.2 LBS | SYSTOLIC BLOOD PRESSURE: 113 MMHG | RESPIRATION RATE: 18 BRPM | HEIGHT: 61 IN | BODY MASS INDEX: 33.27 KG/M2 | TEMPERATURE: 98.4 F | DIASTOLIC BLOOD PRESSURE: 55 MMHG

## 2023-06-26 DIAGNOSIS — N18.31 TYPE 2 DIABETES MELLITUS WITH STAGE 3A CHRONIC KIDNEY DISEASE, WITH LONG-TERM CURRENT USE OF INSULIN (HCC): ICD-10-CM

## 2023-06-26 DIAGNOSIS — I10 ESSENTIAL HYPERTENSION: ICD-10-CM

## 2023-06-26 DIAGNOSIS — E78.00 PURE HYPERCHOLESTEROLEMIA, UNSPECIFIED: ICD-10-CM

## 2023-06-26 DIAGNOSIS — Z79.4 TYPE 2 DIABETES MELLITUS WITH STAGE 3A CHRONIC KIDNEY DISEASE, WITH LONG-TERM CURRENT USE OF INSULIN (HCC): ICD-10-CM

## 2023-06-26 DIAGNOSIS — E11.22 TYPE 2 DIABETES MELLITUS WITH STAGE 3A CHRONIC KIDNEY DISEASE, WITH LONG-TERM CURRENT USE OF INSULIN (HCC): ICD-10-CM

## 2023-06-26 DIAGNOSIS — M17.11 PRIMARY OSTEOARTHRITIS OF RIGHT KNEE: Primary | ICD-10-CM

## 2023-06-26 LAB
ALBUMIN SERPL-MCNC: 3.9 G/DL (ref 3.5–5)
ALBUMIN/GLOB SERPL: 1.1 (ref 1.1–2.2)
ALP SERPL-CCNC: 104 U/L (ref 45–117)
ALT SERPL-CCNC: 23 U/L (ref 12–78)
ANION GAP SERPL CALC-SCNC: 5 MMOL/L (ref 5–15)
AST SERPL-CCNC: 22 U/L (ref 15–37)
BASOPHILS # BLD: 0 K/UL (ref 0–0.1)
BASOPHILS NFR BLD: 0 % (ref 0–1)
BILIRUB SERPL-MCNC: 0.7 MG/DL (ref 0.2–1)
BUN SERPL-MCNC: 35 MG/DL (ref 6–20)
BUN/CREAT SERPL: 21 (ref 12–20)
CALCIUM SERPL-MCNC: 9.9 MG/DL (ref 8.5–10.1)
CHLORIDE SERPL-SCNC: 108 MMOL/L (ref 97–108)
CHOLEST SERPL-MCNC: 141 MG/DL
CO2 SERPL-SCNC: 29 MMOL/L (ref 21–32)
CREAT SERPL-MCNC: 1.69 MG/DL (ref 0.55–1.02)
DIFFERENTIAL METHOD BLD: ABNORMAL
EOSINOPHIL # BLD: 0.2 K/UL (ref 0–0.4)
EOSINOPHIL NFR BLD: 3 % (ref 0–7)
ERYTHROCYTE [DISTWIDTH] IN BLOOD BY AUTOMATED COUNT: 15.8 % (ref 11.5–14.5)
EST. AVERAGE GLUCOSE BLD GHB EST-MCNC: 108 MG/DL
GLOBULIN SER CALC-MCNC: 3.5 G/DL (ref 2–4)
GLUCOSE SERPL-MCNC: 108 MG/DL (ref 65–100)
HBA1C MFR BLD: 5.4 % (ref 4–5.6)
HCT VFR BLD AUTO: 38.9 % (ref 35–47)
HDLC SERPL-MCNC: 50 MG/DL
HDLC SERPL: 2.8 (ref 0–5)
HGB BLD-MCNC: 12 G/DL (ref 11.5–16)
IMM GRANULOCYTES # BLD AUTO: 0 K/UL (ref 0–0.04)
IMM GRANULOCYTES NFR BLD AUTO: 0 % (ref 0–0.5)
LDLC SERPL CALC-MCNC: 72.2 MG/DL (ref 0–100)
LYMPHOCYTES # BLD: 3 K/UL (ref 0.8–3.5)
LYMPHOCYTES NFR BLD: 48 % (ref 12–49)
MCH RBC QN AUTO: 29 PG (ref 26–34)
MCHC RBC AUTO-ENTMCNC: 30.8 G/DL (ref 30–36.5)
MCV RBC AUTO: 94 FL (ref 80–99)
MONOCYTES # BLD: 0.5 K/UL (ref 0–1)
MONOCYTES NFR BLD: 7 % (ref 5–13)
NEUTS SEG # BLD: 2.7 K/UL (ref 1.8–8)
NEUTS SEG NFR BLD: 42 % (ref 32–75)
NRBC # BLD: 0 K/UL (ref 0–0.01)
NRBC BLD-RTO: 0 PER 100 WBC
PLATELET # BLD AUTO: 279 K/UL (ref 150–400)
PMV BLD AUTO: 11.1 FL (ref 8.9–12.9)
POTASSIUM SERPL-SCNC: 3.8 MMOL/L (ref 3.5–5.1)
PROT SERPL-MCNC: 7.4 G/DL (ref 6.4–8.2)
RBC # BLD AUTO: 4.14 M/UL (ref 3.8–5.2)
SODIUM SERPL-SCNC: 142 MMOL/L (ref 136–145)
TRIGL SERPL-MCNC: 94 MG/DL
VLDLC SERPL CALC-MCNC: 18.8 MG/DL
WBC # BLD AUTO: 6.5 K/UL (ref 3.6–11)

## 2023-06-26 PROCEDURE — G8427 DOCREV CUR MEDS BY ELIG CLIN: HCPCS | Performed by: FAMILY MEDICINE

## 2023-06-26 PROCEDURE — 99213 OFFICE O/P EST LOW 20 MIN: CPT | Performed by: FAMILY MEDICINE

## 2023-06-26 PROCEDURE — 1036F TOBACCO NON-USER: CPT | Performed by: FAMILY MEDICINE

## 2023-06-26 PROCEDURE — 20610 DRAIN/INJ JOINT/BURSA W/O US: CPT | Performed by: FAMILY MEDICINE

## 2023-06-26 PROCEDURE — 1123F ACP DISCUSS/DSCN MKR DOCD: CPT | Performed by: FAMILY MEDICINE

## 2023-06-26 PROCEDURE — G8417 CALC BMI ABV UP PARAM F/U: HCPCS | Performed by: FAMILY MEDICINE

## 2023-06-26 PROCEDURE — PBSHW PBB SHADOW CHARGE: Performed by: FAMILY MEDICINE

## 2023-06-26 PROCEDURE — 1090F PRES/ABSN URINE INCON ASSESS: CPT | Performed by: FAMILY MEDICINE

## 2023-06-26 RX ORDER — TRIAMCINOLONE ACETONIDE 40 MG/ML
40 INJECTION, SUSPENSION INTRA-ARTICULAR; INTRAMUSCULAR ONCE
Status: COMPLETED | OUTPATIENT
Start: 2023-06-26 | End: 2023-06-26

## 2023-06-26 RX ORDER — ASPIRIN 81 MG/1
TABLET ORAL DAILY
COMMUNITY

## 2023-06-26 RX ADMIN — TRIAMCINOLONE ACETONIDE 40 MG: 40 INJECTION, SUSPENSION INTRA-ARTICULAR; INTRAMUSCULAR at 12:00

## 2023-06-26 SDOH — ECONOMIC STABILITY: FOOD INSECURITY: WITHIN THE PAST 12 MONTHS, YOU WORRIED THAT YOUR FOOD WOULD RUN OUT BEFORE YOU GOT MONEY TO BUY MORE.: NEVER TRUE

## 2023-06-26 SDOH — ECONOMIC STABILITY: FOOD INSECURITY: WITHIN THE PAST 12 MONTHS, THE FOOD YOU BOUGHT JUST DIDN'T LAST AND YOU DIDN'T HAVE MONEY TO GET MORE.: NEVER TRUE

## 2023-06-26 SDOH — ECONOMIC STABILITY: HOUSING INSECURITY
IN THE LAST 12 MONTHS, WAS THERE A TIME WHEN YOU DID NOT HAVE A STEADY PLACE TO SLEEP OR SLEPT IN A SHELTER (INCLUDING NOW)?: NO

## 2023-06-26 SDOH — ECONOMIC STABILITY: INCOME INSECURITY: HOW HARD IS IT FOR YOU TO PAY FOR THE VERY BASICS LIKE FOOD, HOUSING, MEDICAL CARE, AND HEATING?: NOT HARD AT ALL

## 2023-06-26 ASSESSMENT — PATIENT HEALTH QUESTIONNAIRE - PHQ9
2. FEELING DOWN, DEPRESSED OR HOPELESS: 0
SUM OF ALL RESPONSES TO PHQ9 QUESTIONS 1 & 2: 0
SUM OF ALL RESPONSES TO PHQ QUESTIONS 1-9: 0
SUM OF ALL RESPONSES TO PHQ QUESTIONS 1-9: 0
1. LITTLE INTEREST OR PLEASURE IN DOING THINGS: 0
SUM OF ALL RESPONSES TO PHQ QUESTIONS 1-9: 0
SUM OF ALL RESPONSES TO PHQ QUESTIONS 1-9: 0

## 2023-07-18 DIAGNOSIS — Z79.4 TYPE 2 DIABETES MELLITUS WITH STAGE 3A CHRONIC KIDNEY DISEASE, WITH LONG-TERM CURRENT USE OF INSULIN (HCC): Primary | ICD-10-CM

## 2023-07-18 DIAGNOSIS — N18.31 TYPE 2 DIABETES MELLITUS WITH STAGE 3A CHRONIC KIDNEY DISEASE, WITH LONG-TERM CURRENT USE OF INSULIN (HCC): Primary | ICD-10-CM

## 2023-07-18 DIAGNOSIS — E11.22 TYPE 2 DIABETES MELLITUS WITH STAGE 3A CHRONIC KIDNEY DISEASE, WITH LONG-TERM CURRENT USE OF INSULIN (HCC): Primary | ICD-10-CM

## 2023-07-18 RX ORDER — LANCETS
EACH MISCELLANEOUS
Qty: 100 EACH | Refills: 3 | Status: SHIPPED | OUTPATIENT
Start: 2023-07-18

## 2023-07-18 RX ORDER — BLOOD SUGAR DIAGNOSTIC
STRIP MISCELLANEOUS
Qty: 100 EACH | Refills: 3 | Status: SHIPPED | OUTPATIENT
Start: 2023-07-18

## 2023-07-18 RX ORDER — BLOOD-GLUCOSE METER
EACH MISCELLANEOUS
Qty: 1 KIT | Refills: 0 | Status: SHIPPED | OUTPATIENT
Start: 2023-07-18

## 2023-07-18 NOTE — TELEPHONE ENCOUNTER
Last appointment: 6/26/23  Next appointment: 12/19/23    Requested Prescriptions     Pending Prescriptions Disp Refills    Blood Glucose Monitoring Suppl (ACCU-CHEK GUIDE) w/Device KIT 1 kit 0     Sig: Check glucose once per day as directed, Dx E11.22    blood glucose test strips (ACCU-CHEK GUIDE) strip 100 each 3     Sig: Check glucose once per day as directed, Dx E11.22    Accu-Chek Softclix Lancets MISC 100 each 3     Sig: Check glucose once per day as directed, Dx E11.22         For Pharmacy Admin Tracking Only    Program: Medication Refill  CPA in place:    Recommendation Provided To:    Intervention Detail: New Rx: 3, reason: Patient Preference  Intervention Accepted By:   Rose Shepherd Closed?:    Time Spent (min): 5

## 2023-08-21 RX ORDER — ALLOPURINOL 100 MG/1
TABLET ORAL
Qty: 180 TABLET | Refills: 3 | Status: SHIPPED | OUTPATIENT
Start: 2023-08-21

## 2023-08-21 RX ORDER — CHLORTHALIDONE 25 MG/1
TABLET ORAL
Qty: 90 TABLET | Refills: 3 | Status: SHIPPED | OUTPATIENT
Start: 2023-08-21

## 2023-08-21 RX ORDER — LOSARTAN POTASSIUM 25 MG/1
TABLET ORAL
Qty: 90 TABLET | Refills: 3 | Status: SHIPPED | OUTPATIENT
Start: 2023-08-21

## 2023-08-21 RX ORDER — AMLODIPINE BESYLATE 5 MG/1
TABLET ORAL
Qty: 90 TABLET | Refills: 3 | Status: SHIPPED | OUTPATIENT
Start: 2023-08-21

## 2023-08-21 RX ORDER — ATORVASTATIN CALCIUM 20 MG/1
TABLET, FILM COATED ORAL
Qty: 90 TABLET | Refills: 3 | Status: SHIPPED | OUTPATIENT
Start: 2023-08-21

## 2023-11-14 NOTE — TELEPHONE ENCOUNTER
Last appointment: 6/26/23  Next appointment: 12/19/23  Previous refill encounter(s): 7/5/23 #10 with 3 refills    Requested Prescriptions     Pending Prescriptions Disp Refills    insulin NPH (NOVOLIN N) 100 UNIT/ML injection vial [Pharmacy Med Name: Jony Rubio N 100 UNIT/ML Suspension] 30 mL 3     Sig: Inject 18 Units into the skin every morning AND 8 Units every evening. For Pharmacy Admin Tracking Only    Program: Medication Refill  CPA in place:    Recommendation Provided To:    Intervention Detail: New Rx: 1, reason: Patient Preference  Intervention Accepted By:   Rose Shepherd Closed?:    Time Spent (min): 5

## 2023-12-01 NOTE — TELEPHONE ENCOUNTER
Last Visit: 7/27-Forrest  Next Appt: 11/29Jaci  Previous Refill Encounter: 7/6/17-60+12    Requested Prescriptions     Pending Prescriptions Disp Refills    insulin NPH (NOVOLIN N NPH U-100 INSULIN) 100 unit/mL injection 180 mL 3     Sig: Inject 24 units in am and 12 units in pm Initial (On Arrival)

## 2024-02-29 ENCOUNTER — OFFICE VISIT (OUTPATIENT)
Age: 89
End: 2024-02-29
Payer: MEDICARE

## 2024-02-29 VITALS
WEIGHT: 172.2 LBS | SYSTOLIC BLOOD PRESSURE: 126 MMHG | HEIGHT: 60 IN | OXYGEN SATURATION: 94 % | RESPIRATION RATE: 16 BRPM | DIASTOLIC BLOOD PRESSURE: 55 MMHG | BODY MASS INDEX: 33.81 KG/M2 | HEART RATE: 53 BPM | TEMPERATURE: 98.1 F

## 2024-02-29 DIAGNOSIS — J01.00 ACUTE NON-RECURRENT MAXILLARY SINUSITIS: Primary | ICD-10-CM

## 2024-02-29 PROCEDURE — 1123F ACP DISCUSS/DSCN MKR DOCD: CPT | Performed by: FAMILY MEDICINE

## 2024-02-29 PROCEDURE — G8417 CALC BMI ABV UP PARAM F/U: HCPCS | Performed by: FAMILY MEDICINE

## 2024-02-29 PROCEDURE — G8427 DOCREV CUR MEDS BY ELIG CLIN: HCPCS | Performed by: FAMILY MEDICINE

## 2024-02-29 PROCEDURE — 99213 OFFICE O/P EST LOW 20 MIN: CPT | Performed by: FAMILY MEDICINE

## 2024-02-29 PROCEDURE — 1036F TOBACCO NON-USER: CPT | Performed by: FAMILY MEDICINE

## 2024-02-29 PROCEDURE — G8484 FLU IMMUNIZE NO ADMIN: HCPCS | Performed by: FAMILY MEDICINE

## 2024-02-29 PROCEDURE — 1090F PRES/ABSN URINE INCON ASSESS: CPT | Performed by: FAMILY MEDICINE

## 2024-02-29 RX ORDER — LORATADINE 10 MG/1
10 TABLET ORAL DAILY
Qty: 15 TABLET | Refills: 1 | Status: SHIPPED | OUTPATIENT
Start: 2024-02-29

## 2024-02-29 RX ORDER — AMOXICILLIN 500 MG/1
500 CAPSULE ORAL 3 TIMES DAILY
Qty: 30 CAPSULE | Refills: 0 | Status: SHIPPED | OUTPATIENT
Start: 2024-02-29 | End: 2024-03-10

## 2024-02-29 ASSESSMENT — PATIENT HEALTH QUESTIONNAIRE - PHQ9
2. FEELING DOWN, DEPRESSED OR HOPELESS: 0
SUM OF ALL RESPONSES TO PHQ QUESTIONS 1-9: 0
SUM OF ALL RESPONSES TO PHQ9 QUESTIONS 1 & 2: 0
SUM OF ALL RESPONSES TO PHQ QUESTIONS 1-9: 0
SUM OF ALL RESPONSES TO PHQ QUESTIONS 1-9: 0
1. LITTLE INTEREST OR PLEASURE IN DOING THINGS: 0

## 2024-02-29 NOTE — PROGRESS NOTES
Modesta Interiano (:  1932) is a 89 y.o. female,Established patient, here for evaluation of the following chief complaint(s):  Cough (For 3 weeks.) and Nasal Congestion         ASSESSMENT/PLAN:  1. Acute non-recurrent maxillary sinusitis  -     amoxicillin (AMOXIL) 500 MG capsule; Take 1 capsule by mouth 3 times daily for 10 days, Disp-30 capsule, R-0Normal  -     loratadine (CLARITIN) 10 MG tablet; Take 1 tablet by mouth daily For sneezing, Disp-15 tablet, R-1Print      No follow-ups on file.         Subjective   SUBJECTIVE/OBJECTIVE:  HPI3 week hx sneezing, post nasal drainage. No cough, fever, chills. No pain around eyes. Theraflu- some relief.     Review of Systems       Objective   Physical Exam  HENT:      Head:      Comments: Mild tenderness maxillary sinuses  Cardiovascular:      Rate and Rhythm: Normal rate and regular rhythm.      Heart sounds: Normal heart sounds. No murmur heard.  Pulmonary:      Effort: Pulmonary effort is normal.      Breath sounds: Normal breath sounds.   Abdominal:      General: Abdomen is flat. Bowel sounds are normal.      Palpations: Abdomen is soft.   Musculoskeletal:      Right lower leg: No edema.      Left lower leg: No edema.                  An electronic signature was used to authenticate this note.    --Ayaan Baker MD

## 2024-02-29 NOTE — PROGRESS NOTES
Chief Complaint   Patient presents with    Cough     For 3 weeks.    Nasal Congestion       \"Have you been to the ER, urgent care clinic since your last visit?  Hospitalized since your last visit?\"    NO    “Have you seen or consulted any other health care providers outside of [facility] since your last visit?”    NO             Vitals:    24 1037   BP: (!) 126/55   Pulse:    Resp:    Temp:    SpO2:        Health Maintenance Due   Topic Date Due    DTaP/Tdap/Td vaccine (1 - Tdap) Never done    Shingles vaccine (1 of 2) Never done    Respiratory Syncytial Virus (RSV) Pregnant or age 60 yrs+ (1 - 1-dose 60+ series) Never done    Pneumococcal 65+ years Vaccine (2 - PCV) 2012    Flu vaccine (1) 2023    COVID-19 Vaccine ( season) 2023        The patient, Modesta Interiano, identity was verified by name and .

## 2024-08-05 ENCOUNTER — TELEPHONE (OUTPATIENT)
Age: 89
End: 2024-08-05

## 2024-08-05 NOTE — TELEPHONE ENCOUNTER
Patient daughter contacted and states she feels her mom has early signs of dementia. Daughter voiced that mom has been a little forgetful lately and a little confusion. Offered next appointment with provider for 9/5/24 at 11:00am daughter agreed to appointment date and time.

## 2024-08-05 NOTE — TELEPHONE ENCOUNTER
Patient daughter Molly would like a call from  regarding her mother ? Dementia please give her a call @ [phone]

## 2024-08-13 NOTE — PROGRESS NOTES
HISTORY OF PRESENT ILLNESS  Maldonado Osorio is a 80 y.o. female. HPI cut hand on R 3 rd finger 3 weeks ago, had healed up , but has noted some blood around nail. Has had a bandage on it until this am, took bandage off, and has noted a dark area at base of nail. NO pain. NOted some pus to have come out of finger on Friday. Got some Augmentin at [facility] on Saturday. ROS    Physical Exam   Constitutional: She appears well-developed and well-nourished. HENT:   Right Ear: External ear normal.   Left Ear: External ear normal.   Mouth/Throat: Oropharynx is clear and moist.   Neck: No thyromegaly present. Cardiovascular: Normal rate, regular rhythm, normal heart sounds and intact distal pulses. Pulmonary/Chest: Breath sounds normal. She has no wheezes. Abdominal: Soft. Bowel sounds are normal. She exhibits no distension and no mass. There is no tenderness. Musculoskeletal: She exhibits no edema. R hand- purulent discharge from distal fingertip, finger nontender. I opened it up with a 23 gauge needle- minimal additional drainage. Lymphadenopathy:     She has no cervical adenopathy. Nursing note and vitals reviewed. ASSESSMENT and PLAN  No orders of the defined types were placed in this encounter. No orders of the defined types were placed in this encounter. Diagnoses and all orders for this visit:    1. Paronychia of finger, right      Follow-up Disposition: Not on File  Appears to be resolving. To saok finger in warm water daily, continue antibiotics and followup prn. [facility] OT  CALLED AND STATED THAT MS TREJO IS GOING TO HER ORTHO TODAY AND THEY ARE HOPING HE WILL LIFE THE RESTRICTIONS FOR HER RIGHT SHOULDER SO THEY CAN DO A LITTLE MORE WITH HER. THEY WENT AHEAD AND DID A REASSERT FOR HER FOR 60 DAYS, WITH THE HOPE OF RESTRICTIONS BEING LIFTED TODAY. PHYSICAL THERAPY ALSO DID A REASSERT ON HER FOR HOPEFULLY THE NEXT 30 DAYS TO HELP[ HER WITH HER BALANCE AND GATE. THEY WILL REEVALUATE HER AFTER THE 30/60 DAYS.

## 2024-09-05 ENCOUNTER — OFFICE VISIT (OUTPATIENT)
Age: 89
End: 2024-09-05
Payer: MEDICARE

## 2024-09-05 VITALS
TEMPERATURE: 97.6 F | DIASTOLIC BLOOD PRESSURE: 65 MMHG | WEIGHT: 169 LBS | RESPIRATION RATE: 16 BRPM | HEIGHT: 61 IN | OXYGEN SATURATION: 95 % | BODY MASS INDEX: 31.91 KG/M2 | HEART RATE: 55 BPM | SYSTOLIC BLOOD PRESSURE: 132 MMHG

## 2024-09-05 DIAGNOSIS — F02.B0 MODERATE LATE ONSET ALZHEIMER'S DEMENTIA WITHOUT BEHAVIORAL DISTURBANCE, PSYCHOTIC DISTURBANCE, MOOD DISTURBANCE, OR ANXIETY (HCC): ICD-10-CM

## 2024-09-05 DIAGNOSIS — M17.11 PRIMARY OSTEOARTHRITIS OF RIGHT KNEE: ICD-10-CM

## 2024-09-05 DIAGNOSIS — R53.1 WEAKNESS: ICD-10-CM

## 2024-09-05 DIAGNOSIS — G30.1 MODERATE LATE ONSET ALZHEIMER'S DEMENTIA WITHOUT BEHAVIORAL DISTURBANCE, PSYCHOTIC DISTURBANCE, MOOD DISTURBANCE, OR ANXIETY (HCC): ICD-10-CM

## 2024-09-05 DIAGNOSIS — E11.22 TYPE 2 DIABETES MELLITUS WITH STAGE 3A CHRONIC KIDNEY DISEASE, WITH LONG-TERM CURRENT USE OF INSULIN (HCC): Primary | ICD-10-CM

## 2024-09-05 DIAGNOSIS — Z79.4 TYPE 2 DIABETES MELLITUS WITH STAGE 3A CHRONIC KIDNEY DISEASE, WITH LONG-TERM CURRENT USE OF INSULIN (HCC): Primary | ICD-10-CM

## 2024-09-05 DIAGNOSIS — E78.00 PURE HYPERCHOLESTEROLEMIA, UNSPECIFIED: ICD-10-CM

## 2024-09-05 DIAGNOSIS — N18.31 TYPE 2 DIABETES MELLITUS WITH STAGE 3A CHRONIC KIDNEY DISEASE, WITH LONG-TERM CURRENT USE OF INSULIN (HCC): Primary | ICD-10-CM

## 2024-09-05 DIAGNOSIS — I10 ESSENTIAL HYPERTENSION: ICD-10-CM

## 2024-09-05 DIAGNOSIS — E53.8 B12 DEFICIENCY: ICD-10-CM

## 2024-09-05 DIAGNOSIS — Z23 NEEDS FLU SHOT: ICD-10-CM

## 2024-09-05 PROCEDURE — 20610 DRAIN/INJ JOINT/BURSA W/O US: CPT | Performed by: FAMILY MEDICINE

## 2024-09-05 PROCEDURE — 90471 IMMUNIZATION ADMIN: CPT | Performed by: FAMILY MEDICINE

## 2024-09-05 PROCEDURE — 99214 OFFICE O/P EST MOD 30 MIN: CPT | Performed by: FAMILY MEDICINE

## 2024-09-05 RX ORDER — TRIAMCINOLONE ACETONIDE 40 MG/ML
40 INJECTION, SUSPENSION INTRA-ARTICULAR; INTRAMUSCULAR ONCE
Status: COMPLETED | OUTPATIENT
Start: 2024-09-05 | End: 2024-09-05

## 2024-09-05 RX ADMIN — TRIAMCINOLONE ACETONIDE 40 MG: 40 INJECTION, SUSPENSION INTRA-ARTICULAR; INTRAMUSCULAR at 14:17

## 2024-09-05 SDOH — ECONOMIC STABILITY: FOOD INSECURITY: WITHIN THE PAST 12 MONTHS, THE FOOD YOU BOUGHT JUST DIDN'T LAST AND YOU DIDN'T HAVE MONEY TO GET MORE.: NEVER TRUE

## 2024-09-05 SDOH — ECONOMIC STABILITY: INCOME INSECURITY: HOW HARD IS IT FOR YOU TO PAY FOR THE VERY BASICS LIKE FOOD, HOUSING, MEDICAL CARE, AND HEATING?: NOT VERY HARD

## 2024-09-05 SDOH — ECONOMIC STABILITY: FOOD INSECURITY: WITHIN THE PAST 12 MONTHS, YOU WORRIED THAT YOUR FOOD WOULD RUN OUT BEFORE YOU GOT MONEY TO BUY MORE.: NEVER TRUE

## 2024-09-05 NOTE — ASSESSMENT & PLAN NOTE
Orders:    triamcinolone acetonide (KENALOG-40) injection 40 mg    DRAIN/INJECT LARGE JOINT/BURSA ([id number])

## 2024-09-05 NOTE — PROGRESS NOTES
Chief Complaint   Patient presents with    Altered Mental Status     confusion       \"Have you been to the ER, urgent care clinic since your last visit?  Hospitalized since your last visit?\"    NO    “Have you seen or consulted any other health care providers outside of [facility] since your last visit?”    NO            Click Here for Release of Records Request       Vitals:    09/05/24 1141   Resp: 16      Health Maintenance Due   Topic Date Due    DTaP/Tdap/Td vaccine (1 - Tdap) Never done    Shingles vaccine (1 of 2) Never done    Respiratory Syncytial Virus (RSV) Pregnant or age 60 yrs+ (1 - 1-dose 60+ series) Never done    Pneumococcal 65+ years Vaccine (2 of 2 - PCV) 09/01/2012    Flu vaccine (1) 08/01/2024    COVID-19 Vaccine (7 - 2023-24 season) 09/01/2024      VERBAL ORDER FROM DR FENTON FOR FLUAD IM LEFT DELTOID    [facility] MAIN OFFICE-[facility]  OFFICE PROCEDURE PROGRESS NOTE        Chart reviewed for the following:   Ling ÁLVAREZ LPN, have reviewed the History, Physical and updated the Allergic reactions for No patient name on file.     TIME OUT performed immediately prior to start of procedure:   Ling ÁLVAREZ LPN, have performed the following reviews on No patient name on file. prior to the start of the procedure:            * Patient was identified by name and date of birth   * Agreement on procedure being performed was verified  * Risks and Benefits explained to the patient  * Procedure site verified and marked as necessary  * Patient was positioned for comfort  * Consent was signed and verified     Time: 1:00 PM       Date of procedure:     Procedure performed by:  Not in an encounter context.    Provider assisted by: ANGEL ACOSTA LPN    Patient assisted by: DAUGHTER    How tolerated by patient: WELL    Post Procedural Pain Scale: 2    Comments: TRIAMCINOLONE 40 MG INJECTION INTO RIGHT KNEE.  CONSENT REVIEWED WITH PATIENT BY PROVIDER AND CONSENT SIGNED.     The 
heard.  Pulmonary:      Effort: Pulmonary effort is normal.      Breath sounds: Normal breath sounds.   Abdominal:      General: Abdomen is flat. Bowel sounds are normal.      Palpations: Abdomen is soft.   Musculoskeletal:      Right lower leg: No edema.      Left lower leg: No edema.      Comments: R knee- moderate crepitus   Skin:     Comments: Breasts- no mass   Neurological:      Comments: Remembers 0/3 words at 5 minutes. Clock is definitely abnormal with numbers incorrectly filled in.                   An electronic signature was used to authenticate this note.    --Ayaan Baker MD

## 2024-09-06 LAB
ALBUMIN SERPL-MCNC: 4 G/DL (ref 3.5–5)
ALBUMIN/GLOB SERPL: 1.1 (ref 1.1–2.2)
ALP SERPL-CCNC: 123 U/L (ref 45–117)
ALT SERPL-CCNC: 20 U/L (ref 12–78)
ANION GAP SERPL CALC-SCNC: 1 MMOL/L (ref 2–12)
AST SERPL-CCNC: 23 U/L (ref 15–37)
BASOPHILS # BLD: 0 K/UL (ref 0–0.1)
BASOPHILS NFR BLD: 1 % (ref 0–1)
BILIRUB SERPL-MCNC: 0.6 MG/DL (ref 0.2–1)
BUN SERPL-MCNC: 44 MG/DL (ref 6–20)
BUN/CREAT SERPL: 23 (ref 12–20)
CALCIUM SERPL-MCNC: 9.9 MG/DL (ref 8.5–10.1)
CHLORIDE SERPL-SCNC: 106 MMOL/L (ref 97–108)
CHOLEST SERPL-MCNC: 130 MG/DL
CO2 SERPL-SCNC: 33 MMOL/L (ref 21–32)
CREAT SERPL-MCNC: 1.91 MG/DL (ref 0.55–1.02)
DIFFERENTIAL METHOD BLD: ABNORMAL
EOSINOPHIL # BLD: 0.2 K/UL (ref 0–0.4)
EOSINOPHIL NFR BLD: 3 % (ref 0–7)
ERYTHROCYTE [DISTWIDTH] IN BLOOD BY AUTOMATED COUNT: 15.9 % (ref 11.5–14.5)
EST. AVERAGE GLUCOSE BLD GHB EST-MCNC: 120 MG/DL
GLOBULIN SER CALC-MCNC: 3.8 G/DL (ref 2–4)
GLUCOSE SERPL-MCNC: 66 MG/DL (ref 65–100)
HBA1C MFR BLD: 5.8 % (ref 4–5.6)
HCT VFR BLD AUTO: 38.8 % (ref 35–47)
HDLC SERPL-MCNC: 52 MG/DL
HDLC SERPL: 2.5 (ref 0–5)
HGB BLD-MCNC: 12.2 G/DL (ref 11.5–16)
IMM GRANULOCYTES # BLD AUTO: 0 K/UL (ref 0–0.04)
IMM GRANULOCYTES NFR BLD AUTO: 0 % (ref 0–0.5)
LDLC SERPL CALC-MCNC: 58 MG/DL (ref 0–100)
LYMPHOCYTES # BLD: 3.1 K/UL (ref 0.8–3.5)
LYMPHOCYTES NFR BLD: 47 % (ref 12–49)
MCH RBC QN AUTO: 29.1 PG (ref 26–34)
MCHC RBC AUTO-ENTMCNC: 31.4 G/DL (ref 30–36.5)
MCV RBC AUTO: 92.6 FL (ref 80–99)
MONOCYTES # BLD: 0.4 K/UL (ref 0–1)
MONOCYTES NFR BLD: 7 % (ref 5–13)
NEUTS SEG # BLD: 2.7 K/UL (ref 1.8–8)
NEUTS SEG NFR BLD: 42 % (ref 32–75)
NRBC # BLD: 0 K/UL (ref 0–0.01)
NRBC BLD-RTO: 0 PER 100 WBC
PLATELET # BLD AUTO: 287 K/UL (ref 150–400)
PMV BLD AUTO: 10.7 FL (ref 8.9–12.9)
POTASSIUM SERPL-SCNC: 4.3 MMOL/L (ref 3.5–5.1)
PROT SERPL-MCNC: 7.8 G/DL (ref 6.4–8.2)
RBC # BLD AUTO: 4.19 M/UL (ref 3.8–5.2)
SODIUM SERPL-SCNC: 140 MMOL/L (ref 136–145)
TRIGL SERPL-MCNC: 100 MG/DL
VLDLC SERPL CALC-MCNC: 20 MG/DL
WBC # BLD AUTO: 6.4 K/UL (ref 3.6–11)

## 2024-09-11 ENCOUNTER — HOSPITAL ENCOUNTER (OUTPATIENT)
Facility: HOSPITAL | Age: 89
Discharge: HOME | End: 2024-09-14
Attending: FAMILY MEDICINE
Payer: MEDICARE

## 2024-09-11 DIAGNOSIS — F02.B0 MODERATE LATE ONSET ALZHEIMER'S DEMENTIA WITHOUT BEHAVIORAL DISTURBANCE, PSYCHOTIC DISTURBANCE, MOOD DISTURBANCE, OR ANXIETY (HCC): ICD-10-CM

## 2024-09-11 DIAGNOSIS — G30.1 MODERATE LATE ONSET ALZHEIMER'S DEMENTIA WITHOUT BEHAVIORAL DISTURBANCE, PSYCHOTIC DISTURBANCE, MOOD DISTURBANCE, OR ANXIETY (HCC): ICD-10-CM

## 2024-09-11 PROCEDURE — 70450 CT HEAD/BRAIN W/O DYE: CPT

## 2024-09-16 RX ORDER — CHLORTHALIDONE 15 MG/1
15 TABLET ORAL DAILY
Qty: 90 TABLET | Refills: 3 | Status: SHIPPED | OUTPATIENT
Start: 2024-09-16 | End: 2024-09-24 | Stop reason: ALTCHOICE

## 2024-09-24 ENCOUNTER — PATIENT MESSAGE (OUTPATIENT)
Age: 89
End: 2024-09-24

## 2024-09-24 RX ORDER — INDAPAMIDE 1.25 MG/1
1.25 TABLET ORAL DAILY
Qty: 30 TABLET | Refills: 12 | Status: SHIPPED | OUTPATIENT
Start: 2024-09-24

## 2024-10-16 RX ORDER — LOSARTAN POTASSIUM 25 MG/1
TABLET ORAL
Qty: 90 TABLET | Refills: 3 | Status: SHIPPED | OUTPATIENT
Start: 2024-10-16

## 2024-10-16 RX ORDER — ATORVASTATIN CALCIUM 20 MG/1
TABLET, FILM COATED ORAL
Qty: 90 TABLET | Refills: 3 | Status: SHIPPED | OUTPATIENT
Start: 2024-10-16

## 2024-10-16 RX ORDER — CHLORTHALIDONE 25 MG/1
TABLET ORAL
Qty: 90 TABLET | Refills: 3 | Status: SHIPPED | OUTPATIENT
Start: 2024-10-16

## 2024-10-16 RX ORDER — AMLODIPINE BESYLATE 5 MG/1
TABLET ORAL
Qty: 90 TABLET | Refills: 3 | Status: SHIPPED | OUTPATIENT
Start: 2024-10-16

## 2024-10-16 RX ORDER — HUMAN INSULIN 100 [IU]/ML
INJECTION, SUSPENSION SUBCUTANEOUS
Qty: 30 ML | Refills: 3 | Status: SHIPPED | OUTPATIENT
Start: 2024-10-16

## 2024-10-28 ENCOUNTER — TELEPHONE (OUTPATIENT)
Age: 88
End: 2024-10-28

## 2024-10-28 NOTE — TELEPHONE ENCOUNTER
Patient daughter Nguyen would like for her mother to get a covid shot she can be reached @ [phone]

## 2024-11-06 ENCOUNTER — IMMUNIZATION (OUTPATIENT)
Age: 89
End: 2024-11-06
Payer: MEDICARE

## 2024-11-06 VITALS
OXYGEN SATURATION: 95 % | SYSTOLIC BLOOD PRESSURE: 123 MMHG | HEART RATE: 57 BPM | DIASTOLIC BLOOD PRESSURE: 59 MMHG | TEMPERATURE: 98.3 F | RESPIRATION RATE: 18 BRPM

## 2024-11-06 DIAGNOSIS — Z23 NEED FOR VACCINATION: Primary | ICD-10-CM

## 2024-11-06 PROCEDURE — 91320 SARSCV2 VAC 30MCG TRS-SUC IM: CPT | Performed by: FAMILY MEDICINE

## 2024-11-06 PROCEDURE — PBSHW COVID-19, COMIRNATY, (AGE 12Y+), IM, PF, 30MCG/0.3ML: Performed by: FAMILY MEDICINE

## 2024-11-06 NOTE — PROGRESS NOTES
Chief Complaint   Patient presents with    Flu Vaccine         After obtaining informed consent, the Covid immunization is given by ARIANNE ELIZABETH per verbal order from Dr. Baker. Observed for 15 min, no reaction.

## 2024-11-26 ENCOUNTER — TELEPHONE (OUTPATIENT)
Age: 88
End: 2024-11-26

## 2024-11-26 ENCOUNTER — OFFICE VISIT (OUTPATIENT)
Age: 88
End: 2024-11-26

## 2024-11-26 VITALS
DIASTOLIC BLOOD PRESSURE: 65 MMHG | HEART RATE: 77 BPM | OXYGEN SATURATION: 96 % | TEMPERATURE: 97.7 F | SYSTOLIC BLOOD PRESSURE: 132 MMHG

## 2024-11-26 DIAGNOSIS — N30.01 ACUTE CYSTITIS WITH HEMATURIA: Primary | ICD-10-CM

## 2024-11-26 DIAGNOSIS — R35.0 URINARY FREQUENCY: ICD-10-CM

## 2024-11-26 LAB
BILIRUBIN, URINE, POC: ABNORMAL
BLOOD URINE, POC: ABNORMAL
GLUCOSE URINE, POC: NEGATIVE
KETONES, URINE, POC: NEGATIVE
LEUKOCYTE ESTERASE, URINE, POC: ABNORMAL
NITRITE, URINE, POC: NEGATIVE
PH, URINE, POC: 6 (ref 4.6–8)
PROTEIN,URINE, POC: ABNORMAL
SPECIFIC GRAVITY, URINE, POC: 1.03 (ref 1–1.03)
URINALYSIS CLARITY, POC: ABNORMAL
URINALYSIS COLOR, POC: YELLOW
UROBILINOGEN, POC: ABNORMAL MG/DL

## 2024-11-26 RX ORDER — SULFAMETHOXAZOLE AND TRIMETHOPRIM 400; 80 MG/1; MG/1
1 TABLET ORAL 2 TIMES DAILY
Qty: 20 TABLET | Refills: 0 | Status: SHIPPED | OUTPATIENT
Start: 2024-11-26 | End: 2024-12-06

## 2024-11-26 RX ORDER — NITROFURANTOIN 25; 75 MG/1; MG/1
100 CAPSULE ORAL 2 TIMES DAILY
Qty: 10 CAPSULE | Refills: 0 | Status: SHIPPED | OUTPATIENT
Start: 2024-11-26 | End: 2024-11-26 | Stop reason: CLARIF

## 2024-11-26 NOTE — PROGRESS NOTES
2024   Modesta Interiano (: 1932) is a 89 y.o. female, New patient, here for evaluation of the following chief complaint(s):  Urinary Tract Infection (When patient goes to bathroom her right arm starts hurting, urgency and can't hold urine since /)     ASSESSMENT/PLAN:  Below is the assessment and plan developed based on review of pertinent history, physical exam, labs, studies, and medications.  Assessment & Plan  Urinary frequency       Orders:    AMB POC URINALYSIS DIP STICK MANUAL W/O MICRO  Exam and history consistent with a UTI.  -Renally dosed Bactrim twice a day for the next 10 days  -Drink plenty of fluids to ensure hydration  -Please follow-up with PCP in the next 1 to 2 weeks  -Tylenol for pain control    If symptoms persist or worsen, please contact PCP and/or return to Urgent Care.     Handout given with care instructions  2. OTC for symptom management. Increase fluid intake, ensure adequate nutritional intake.  3. Follow up with PCP in the next 1 to 2 weeks.  4. Go to ED with development of any acute symptoms.     Follow up:  No follow-ups on file.  Follow up immediately for any new, worsening or changes or if symptoms are not improving over the next 5-7 days.     SUBJECTIVE/OBJECTIVE:  HPI   Ms. Simpson presents with her daughter for concerns for urinary frequency and pain shooting down her arms while urinating for the past couple days.  She states she has never had symptoms like this before, and she does not get the symptoms when doing anything else except for urinating. Otherwise, she denies any current headache, fever, chills, shortness of breath, chest pain, or other systemic complaints or concerns at this time.  Urinary Tract Infection (When patient goes to bathroom her right arm starts hurting, urgency and can't hold urine since /)      No results found for any visits on 24.    No results found for this visit on 24.    Review of Systems    ROS reviewed and

## 2024-11-26 NOTE — TELEPHONE ENCOUNTER
[phone] - Daughter thinks the patient has a UTI or bladder infection. Thinks patient needs to be seen . Was offered an appointment on 12/3 but states the patient needs to be seen sooner.

## 2024-11-26 NOTE — PATIENT INSTRUCTIONS
Exam and history consistent with a UTI.  -Macrobid twice a day for the next 5 days  -Drink plenty of fluids to ensure hydration  -Please follow-up with PCP in the next 1 to 2 weeks  -Tylenol for pain control    If symptoms persist or worsen, please contact PCP and/or return to Urgent Care.

## 2024-11-29 ENCOUNTER — OFFICE VISIT (OUTPATIENT)
Age: 88
End: 2024-11-29
Payer: MEDICARE

## 2024-11-29 VITALS
DIASTOLIC BLOOD PRESSURE: 56 MMHG | HEIGHT: 61 IN | TEMPERATURE: 96.1 F | WEIGHT: 166.6 LBS | HEART RATE: 60 BPM | SYSTOLIC BLOOD PRESSURE: 133 MMHG | OXYGEN SATURATION: 99 % | BODY MASS INDEX: 31.45 KG/M2 | RESPIRATION RATE: 18 BRPM

## 2024-11-29 DIAGNOSIS — R35.0 URINARY FREQUENCY: Primary | ICD-10-CM

## 2024-11-29 DIAGNOSIS — N18.4 CHRONIC KIDNEY DISEASE, STAGE 4 (SEVERE) (HCC): ICD-10-CM

## 2024-11-29 LAB
BILIRUBIN, URINE, POC: NEGATIVE
BLOOD URINE, POC: NEGATIVE
GLUCOSE URINE, POC: NEGATIVE
GLUCOSE, POC: 91 MG/DL
KETONES, URINE, POC: NEGATIVE
LEUKOCYTE ESTERASE, URINE, POC: ABNORMAL
NITRITE, URINE, POC: NEGATIVE
PH, URINE, POC: 5.5 (ref 4.6–8)
PROTEIN,URINE, POC: NEGATIVE
SPECIFIC GRAVITY, URINE, POC: 1.02 (ref 1–1.03)
URINALYSIS CLARITY, POC: CLEAR
URINALYSIS COLOR, POC: YELLOW
UROBILINOGEN, POC: ABNORMAL

## 2024-11-29 PROCEDURE — 1123F ACP DISCUSS/DSCN MKR DOCD: CPT | Performed by: FAMILY MEDICINE

## 2024-11-29 PROCEDURE — G8482 FLU IMMUNIZE ORDER/ADMIN: HCPCS | Performed by: FAMILY MEDICINE

## 2024-11-29 PROCEDURE — 1126F AMNT PAIN NOTED NONE PRSNT: CPT | Performed by: FAMILY MEDICINE

## 2024-11-29 PROCEDURE — 1159F MED LIST DOCD IN RCRD: CPT | Performed by: FAMILY MEDICINE

## 2024-11-29 PROCEDURE — PBSHW AMB POC GLUCOSE BLOOD, BY GLUCOSE MONITORING DEVICE: Performed by: FAMILY MEDICINE

## 2024-11-29 PROCEDURE — 1036F TOBACCO NON-USER: CPT | Performed by: FAMILY MEDICINE

## 2024-11-29 PROCEDURE — 99213 OFFICE O/P EST LOW 20 MIN: CPT | Performed by: FAMILY MEDICINE

## 2024-11-29 PROCEDURE — G8427 DOCREV CUR MEDS BY ELIG CLIN: HCPCS | Performed by: FAMILY MEDICINE

## 2024-11-29 PROCEDURE — 1090F PRES/ABSN URINE INCON ASSESS: CPT | Performed by: FAMILY MEDICINE

## 2024-11-29 PROCEDURE — 82962 GLUCOSE BLOOD TEST: CPT | Performed by: FAMILY MEDICINE

## 2024-11-29 PROCEDURE — 81003 URINALYSIS AUTO W/O SCOPE: CPT | Performed by: FAMILY MEDICINE

## 2024-11-29 PROCEDURE — PBSHW AMB POC URINALYSIS DIP STICK AUTO W/O MICRO: Performed by: FAMILY MEDICINE

## 2024-11-29 PROCEDURE — G8417 CALC BMI ABV UP PARAM F/U: HCPCS | Performed by: FAMILY MEDICINE

## 2024-11-29 NOTE — PROGRESS NOTES
Modesta Interiano (:  1932) is a 89 y.o. female,, here for evaluation of the following chief complaint(s):  Follow-up (Urgent care for frequent urination)         Assessment & Plan  Chronic kidney disease, stage 4 (severe) (HCC)  To take bp meds regularly. Continue close followup with Dr. Owens         Urinary frequency       Orders:    AMB POC GLUCOSE BLOOD, BY GLUCOSE MONITORING DEVICE; Future    AMB POC GLUCOSE BLOOD, BY GLUCOSE MONITORING DEVICE    AMB POC URINALYSIS DIP STICK AUTO W/O MICRO  pt was not able to leave enough urine for a culture. Continue bactrim for now. If no better by Monday, to bring in a urine specimen for culture (container given)(    No follow-ups on file.       Subjective   HPI Has been having urinary frequency for one week. No dysuria. Has been having problems wetting herself. Not on any new diuretics. Went to Urgent Care Wednesday, put on Bactrim . Still having symptoms. Has only had 3 doses of bactrim. No fever, cjhills, abdominal pain    Review of Systems       Objective   Physical Exam  Cardiovascular:      Rate and Rhythm: Normal rate and regular rhythm.      Heart sounds: Normal heart sounds. No murmur heard.  Pulmonary:      Effort: Pulmonary effort is normal.      Breath sounds: Normal breath sounds.   Abdominal:      General: Abdomen is flat. Bowel sounds are normal.      Palpations: Abdomen is soft.   Musculoskeletal:      Right lower leg: No edema.      Left lower leg: No edema.                  An electronic signature was used to authenticate this note.    --Ayaan Baker MD

## 2024-11-29 NOTE — PROGRESS NOTES
\"Have you been to the ER, urgent care clinic since your last visit?  Hospitalized since your last visit?\"    NO    “Have you seen or consulted any other health care providers outside our system since your last visit?”    NO           Chief Complaint   Patient presents with    Follow-up     Urgent care for frequent urination     BP (!) 133/56 (Site: Right Upper Arm, Position: Sitting, Cuff Size: Medium Adult)   Pulse 60   Temp (!) 96.1 °F (35.6 °C) (Oral)   Resp 18   Ht 1.549 m (5' 1\")   Wt 75.6 kg (166 lb 9.6 oz)   SpO2 99%   BMI 31.48 kg/m²

## 2024-12-02 RX ORDER — ALLOPURINOL 100 MG/1
200 TABLET ORAL DAILY
Qty: 180 TABLET | Refills: 1 | Status: SHIPPED | OUTPATIENT
Start: 2024-12-02

## 2025-02-05 ENCOUNTER — OFFICE VISIT (OUTPATIENT)
Age: 89
End: 2025-02-05
Payer: MEDICARE

## 2025-02-05 VITALS
BODY MASS INDEX: 31.38 KG/M2 | WEIGHT: 166.2 LBS | DIASTOLIC BLOOD PRESSURE: 60 MMHG | HEART RATE: 58 BPM | HEIGHT: 61 IN | TEMPERATURE: 98.5 F | SYSTOLIC BLOOD PRESSURE: 121 MMHG | OXYGEN SATURATION: 96 % | RESPIRATION RATE: 16 BRPM

## 2025-02-05 DIAGNOSIS — E11.22 TYPE 2 DIABETES MELLITUS WITH STAGE 3A CHRONIC KIDNEY DISEASE, WITH LONG-TERM CURRENT USE OF INSULIN (HCC): ICD-10-CM

## 2025-02-05 DIAGNOSIS — N18.4 CHRONIC KIDNEY DISEASE, STAGE 4 (SEVERE) (HCC): ICD-10-CM

## 2025-02-05 DIAGNOSIS — Z79.4 TYPE 2 DIABETES MELLITUS WITH STAGE 3A CHRONIC KIDNEY DISEASE, WITH LONG-TERM CURRENT USE OF INSULIN (HCC): ICD-10-CM

## 2025-02-05 DIAGNOSIS — N18.31 TYPE 2 DIABETES MELLITUS WITH STAGE 3A CHRONIC KIDNEY DISEASE, WITH LONG-TERM CURRENT USE OF INSULIN (HCC): ICD-10-CM

## 2025-02-05 DIAGNOSIS — F02.B0 MODERATE LATE ONSET ALZHEIMER'S DEMENTIA WITHOUT BEHAVIORAL DISTURBANCE, PSYCHOTIC DISTURBANCE, MOOD DISTURBANCE, OR ANXIETY (HCC): ICD-10-CM

## 2025-02-05 DIAGNOSIS — Z00.00 ENCOUNTER FOR MEDICARE ANNUAL WELLNESS EXAM: Primary | ICD-10-CM

## 2025-02-05 DIAGNOSIS — G30.1 MODERATE LATE ONSET ALZHEIMER'S DEMENTIA WITHOUT BEHAVIORAL DISTURBANCE, PSYCHOTIC DISTURBANCE, MOOD DISTURBANCE, OR ANXIETY (HCC): ICD-10-CM

## 2025-02-05 DIAGNOSIS — Z23 ENCOUNTER FOR IMMUNIZATION: ICD-10-CM

## 2025-02-05 PROBLEM — N18.30 CHRONIC RENAL INSUFFICIENCY, STAGE III (MODERATE) (HCC): Status: RESOLVED | Noted: 2017-12-06 | Resolved: 2025-02-05

## 2025-02-05 PROCEDURE — 90677 PCV20 VACCINE IM: CPT | Performed by: FAMILY MEDICINE

## 2025-02-05 PROCEDURE — 99213 OFFICE O/P EST LOW 20 MIN: CPT | Performed by: FAMILY MEDICINE

## 2025-02-05 RX ORDER — ALLOPURINOL 100 MG/1
200 TABLET ORAL DAILY
Qty: 180 TABLET | Refills: 3 | Status: SHIPPED | OUTPATIENT
Start: 2025-02-05

## 2025-02-05 RX ORDER — INDAPAMIDE 1.25 MG/1
1.25 TABLET ORAL DAILY
Qty: 90 TABLET | Refills: 3 | Status: SHIPPED | OUTPATIENT
Start: 2025-02-05

## 2025-02-05 RX ORDER — AMLODIPINE BESYLATE 5 MG/1
5 TABLET ORAL DAILY
Qty: 90 TABLET | Refills: 3 | Status: SHIPPED | OUTPATIENT
Start: 2025-02-05

## 2025-02-05 RX ORDER — ATORVASTATIN CALCIUM 20 MG/1
20 TABLET, FILM COATED ORAL DAILY
Qty: 90 TABLET | Refills: 3 | Status: SHIPPED | OUTPATIENT
Start: 2025-02-05

## 2025-02-05 RX ORDER — SYRGE-NDL,INS 0.5 ML HALF MARK 30 G X1/2"
1 SYRINGE, EMPTY DISPOSABLE MISCELLANEOUS DAILY
Qty: 100 EACH | Refills: 3 | Status: SHIPPED | OUTPATIENT
Start: 2025-02-05

## 2025-02-05 RX ORDER — AMMONIUM LACTATE 12 G/100G
CREAM TOPICAL 2 TIMES DAILY PRN
Qty: 385 G | Refills: 2 | Status: SHIPPED | OUTPATIENT
Start: 2025-02-05

## 2025-02-05 SDOH — ECONOMIC STABILITY: FOOD INSECURITY: WITHIN THE PAST 12 MONTHS, YOU WORRIED THAT YOUR FOOD WOULD RUN OUT BEFORE YOU GOT MONEY TO BUY MORE.: NEVER TRUE

## 2025-02-05 SDOH — ECONOMIC STABILITY: FOOD INSECURITY: WITHIN THE PAST 12 MONTHS, THE FOOD YOU BOUGHT JUST DIDN'T LAST AND YOU DIDN'T HAVE MONEY TO GET MORE.: NEVER TRUE

## 2025-02-05 ASSESSMENT — PATIENT HEALTH QUESTIONNAIRE - PHQ9
1. LITTLE INTEREST OR PLEASURE IN DOING THINGS: NOT AT ALL
SUM OF ALL RESPONSES TO PHQ QUESTIONS 1-9: 0
SUM OF ALL RESPONSES TO PHQ9 QUESTIONS 1 & 2: 0
2. FEELING DOWN, DEPRESSED OR HOPELESS: NOT AT ALL
SUM OF ALL RESPONSES TO PHQ QUESTIONS 1-9: 0

## 2025-02-05 ASSESSMENT — LIFESTYLE VARIABLES
HOW OFTEN DO YOU HAVE A DRINK CONTAINING ALCOHOL: NEVER
HOW MANY STANDARD DRINKS CONTAINING ALCOHOL DO YOU HAVE ON A TYPICAL DAY: PATIENT DOES NOT DRINK

## 2025-02-05 NOTE — ASSESSMENT & PLAN NOTE
Orders:    CBC with Auto Differential; Future    Hemoglobin A1C; Future    Lipid Panel; Future

## 2025-02-05 NOTE — PROGRESS NOTES
Chief Complaint   Patient presents with    Medicare AWV       \"Have you been to the ER, urgent care clinic since your last visit?  Hospitalized since your last visit?\"    NO    “Have you seen or consulted any other health care providers outside of [facility] since your last visit?”    NO            Click Here for Release of Records Request       Vitals:    25 1426   BP: 121/60   Pulse: 58   Resp: 16   Temp: 98.5 °F (36.9 °C)   SpO2: 96%      Health Maintenance Due   Topic Date Due    DTaP/Tdap/Td vaccine (1 - Tdap) Never done    Shingles vaccine (1 of 2) Never done    Respiratory Syncytial Virus (RSV) Pregnant or age 60 yrs+ (1 - 1-dose 75+ series) Never done    Annual Wellness Visit (Medicare Advantage)  2025    Depression Screen  2025        The patient, Modesta Interiano, identity was verified by name and .

## 2025-02-05 NOTE — PROGRESS NOTES
Modesta Interiano (:  1932) is a 89 y.o. female,Established patient, here for evaluation of the following chief complaint(s):  Medicare AWV         Assessment & Plan  Encounter for immunization       Orders:    Pneumococcal, PCV20, PREVNAR 20, (age 6w+), IM, PF    Moderate late onset Alzheimer's dementia without behavioral disturbance, psychotic disturbance, mood disturbance, or anxiety (HCC)    Stable- still living by herself.          Chronic kidney disease, stage 4 (severe) (HCC)       Orders:    Comprehensive Metabolic Panel; Future    Type 2 diabetes mellitus with stage 3a chronic kidney disease, with long-term current use of insulin (HCC)       Orders:    CBC with Auto Differential; Future    Hemoglobin A1C; Future    Lipid Panel; Future      No follow-ups on file.       Subjective   HPI IN for medicare wellness exam. Sees Dr. Owens-(nephrology). Needs prevnar shot. Would want oli Juarez to be her mPOA if she became incapacitated.   Still has some difficulty sleeping at night.  Needs diabetes checked. No hypoglycemic episodes. No complaints of chest pain, shortness of breath, TIAs, claudication or edema.      Review of Systems   HENT:  Negative for hearing loss.    Neurological:         No falls. Uses a cane. Independent in all adls. Msome short term memory loss.    Psychiatric/Behavioral:          Not depressed. No alcohol.           Objective   Physical Exam  Cardiovascular:      Rate and Rhythm: Normal rate and regular rhythm.      Heart sounds: Normal heart sounds. No murmur heard.  Pulmonary:      Effort: Pulmonary effort is normal.      Breath sounds: Normal breath sounds.   Abdominal:      General: Abdomen is flat. Bowel sounds are normal.      Palpations: Abdomen is soft.   Musculoskeletal:      Right lower leg: No edema.      Left lower leg: No edema.                  An electronic signature was used to authenticate this note.    --Ayaan Baker MD

## 2025-02-06 LAB
ALBUMIN SERPL-MCNC: 3.7 G/DL (ref 3.5–5)
ALBUMIN/GLOB SERPL: 0.9 (ref 1.1–2.2)
ALP SERPL-CCNC: 98 U/L (ref 45–117)
ALT SERPL-CCNC: 23 U/L (ref 12–78)
ANION GAP SERPL CALC-SCNC: 6 MMOL/L (ref 2–12)
AST SERPL-CCNC: 23 U/L (ref 15–37)
BASOPHILS # BLD: 0.02 K/UL (ref 0–0.1)
BASOPHILS NFR BLD: 0.3 % (ref 0–1)
BILIRUB SERPL-MCNC: 0.6 MG/DL (ref 0.2–1)
BUN SERPL-MCNC: 27 MG/DL (ref 6–20)
BUN/CREAT SERPL: 16 (ref 12–20)
CALCIUM SERPL-MCNC: 10 MG/DL (ref 8.5–10.1)
CHLORIDE SERPL-SCNC: 106 MMOL/L (ref 97–108)
CHOLEST SERPL-MCNC: 130 MG/DL
CO2 SERPL-SCNC: 27 MMOL/L (ref 21–32)
CREAT SERPL-MCNC: 1.72 MG/DL (ref 0.55–1.02)
DIFFERENTIAL METHOD BLD: ABNORMAL
EOSINOPHIL # BLD: 0.12 K/UL (ref 0–0.4)
EOSINOPHIL NFR BLD: 1.8 % (ref 0–7)
ERYTHROCYTE [DISTWIDTH] IN BLOOD BY AUTOMATED COUNT: 15.7 % (ref 11.5–14.5)
EST. AVERAGE GLUCOSE BLD GHB EST-MCNC: 120 MG/DL
GLOBULIN SER CALC-MCNC: 3.9 G/DL (ref 2–4)
GLUCOSE SERPL-MCNC: 64 MG/DL (ref 65–100)
HBA1C MFR BLD: 5.8 % (ref 4–5.6)
HCT VFR BLD AUTO: 36.6 % (ref 35–47)
HDLC SERPL-MCNC: 52 MG/DL
HDLC SERPL: 2.5 (ref 0–5)
HGB BLD-MCNC: 11.6 G/DL (ref 11.5–16)
IMM GRANULOCYTES # BLD AUTO: 0.01 K/UL (ref 0–0.04)
IMM GRANULOCYTES NFR BLD AUTO: 0.2 % (ref 0–0.5)
LDLC SERPL CALC-MCNC: 60 MG/DL (ref 0–100)
LYMPHOCYTES # BLD: 3.12 K/UL (ref 0.8–3.5)
LYMPHOCYTES NFR BLD: 47.4 % (ref 12–49)
MCH RBC QN AUTO: 29.3 PG (ref 26–34)
MCHC RBC AUTO-ENTMCNC: 31.7 G/DL (ref 30–36.5)
MCV RBC AUTO: 92.4 FL (ref 80–99)
MONOCYTES # BLD: 0.5 K/UL (ref 0–1)
MONOCYTES NFR BLD: 7.6 % (ref 5–13)
NEUTS SEG # BLD: 2.81 K/UL (ref 1.8–8)
NEUTS SEG NFR BLD: 42.7 % (ref 32–75)
NRBC # BLD: 0 K/UL (ref 0–0.01)
NRBC BLD-RTO: 0 PER 100 WBC
PLATELET # BLD AUTO: 268 K/UL (ref 150–400)
PMV BLD AUTO: 10.4 FL (ref 8.9–12.9)
POTASSIUM SERPL-SCNC: 4 MMOL/L (ref 3.5–5.1)
PROT SERPL-MCNC: 7.6 G/DL (ref 6.4–8.2)
RBC # BLD AUTO: 3.96 M/UL (ref 3.8–5.2)
SODIUM SERPL-SCNC: 139 MMOL/L (ref 136–145)
TRIGL SERPL-MCNC: 90 MG/DL
VLDLC SERPL CALC-MCNC: 18 MG/DL
WBC # BLD AUTO: 6.6 K/UL (ref 3.6–11)

## 2025-02-14 RX ORDER — LOSARTAN POTASSIUM 25 MG/1
25 TABLET ORAL DAILY
Qty: 90 TABLET | Refills: 3 | Status: CANCELLED | OUTPATIENT
Start: 2025-02-14

## 2025-02-14 NOTE — TELEPHONE ENCOUNTER
Pt is requesting this be sent to [facility]. Previous Rx was sent to mailorder.    Last appointment: 2/5/25  Next appointment: Advised to follow-up 6/5/25  Previous refill encounter(s): 10/16/24    Requested Prescriptions     Pending Prescriptions Disp Refills    losartan (COZAAR) 25 MG tablet 90 tablet 3     Sig: Take 1 tablet by mouth daily         For Pharmacy Admin Tracking Only    Program: Medication Refill  CPA in place:    Recommendation Provided To:   Intervention Detail: New Rx: 1, reason: Patient Preference  Intervention Accepted By:   Gap Closed?:    Time Spent (min): 5

## 2025-02-14 NOTE — TELEPHONE ENCOUNTER
Patient called today with new pharmacy for med refill.  Patient seen 2/5/25 and pharmacy verified at exam.  Refill pended to Dr. Baker for review.

## 2025-02-15 RX ORDER — LOSARTAN POTASSIUM 25 MG/1
25 TABLET ORAL DAILY
Qty: 90 TABLET | Refills: 3 | Status: SHIPPED | OUTPATIENT
Start: 2025-02-15

## 2025-04-03 ENCOUNTER — OFFICE VISIT (OUTPATIENT)
Age: 89
End: 2025-04-03

## 2025-04-03 VITALS
WEIGHT: 168 LBS | TEMPERATURE: 98.3 F | RESPIRATION RATE: 16 BRPM | SYSTOLIC BLOOD PRESSURE: 124 MMHG | DIASTOLIC BLOOD PRESSURE: 70 MMHG | HEART RATE: 65 BPM | OXYGEN SATURATION: 95 % | BODY MASS INDEX: 31.74 KG/M2

## 2025-04-03 DIAGNOSIS — B37.9 YEAST INFECTION: ICD-10-CM

## 2025-04-03 DIAGNOSIS — B36.9 FUNGAL INFECTION OF SKIN: Primary | ICD-10-CM

## 2025-04-03 RX ORDER — FLUCONAZOLE 150 MG/1
150 TABLET ORAL ONCE
Qty: 1 TABLET | Refills: 0 | Status: SHIPPED | OUTPATIENT
Start: 2025-04-03 | End: 2025-04-03

## 2025-04-03 RX ORDER — NYSTATIN 100000 [USP'U]/G
POWDER TOPICAL
Qty: 60 G | Refills: 0 | Status: SHIPPED | OUTPATIENT
Start: 2025-04-03

## 2025-04-03 RX ORDER — NYSTATIN 100000 U/G
CREAM TOPICAL
Qty: 30 G | Refills: 0 | Status: SHIPPED | OUTPATIENT
Start: 2025-04-03

## 2025-04-03 ASSESSMENT — ENCOUNTER SYMPTOMS
DIARRHEA: 0
WHEEZING: 0
TROUBLE SWALLOWING: 0
VOMITING: 0
CHEST TIGHTNESS: 0
SORE THROAT: 0
SHORTNESS OF BREATH: 0
RHINORRHEA: 0
SINUS PAIN: 0
BACK PAIN: 0
ABDOMINAL PAIN: 0
NAUSEA: 0
COUGH: 0

## 2025-04-03 NOTE — PROGRESS NOTES
Chief Complaint   Patient presents with    Yeast Infection     X yesterday, itching, discharge, spreading to stomach and thighs       HPI    See chief complain.  No signs of oozing or drainage or vaginal discharge noted.  Denies chest pain or SOB.      Past Medical History:   Diagnosis Date    Chronic renal insufficiency, stage III (moderate) ([facility]) 12/06/2017    Diabetes ([facility])     Diabetes mellitus ([facility]) 05/01/2012    Hypercholesterolemia     Hypertension     Other ill-defined conditions(799.89)     gout    Peripheral vascular disease     Refusal of blood transfusions as patient is Shinto 2/26/2013       Past Surgical History:   Procedure Laterality Date    ANGIOPLASTY  2014- tae    L popliteal artery    CABG, ARTERIAL, FOUR+  2005- Dr. Nolan    CHOLECYSTECTOMY      HYSTERECTOMY (CERVIX STATUS UNKNOWN)         Social History     Tobacco Use    Smoking status: Never    Smokeless tobacco: Never   Vaping Use    Vaping status: Never Used   Substance Use Topics    Alcohol use: No    Drug use: No        Allergies   Allergen Reactions    Iodine Other (See Comments)     Affects kidneys  Other reaction(s): Other (see comments)  Affects kidneys    Morphine Other (See Comments) and Cough     Pt does not know the reaction  Other reaction(s): Other (see comments), Unknown  Reaction Type: Allergy  Pt does not know the reaction  Pt does not know the reaction  Pt does not know the reaction  Pt does not know the reaction          Review of Systems   Constitutional:  Negative for chills, fatigue and fever.   HENT:  Negative for congestion, postnasal drip, rhinorrhea, sinus pain, sore throat, tinnitus and trouble swallowing.    Respiratory:  Negative for cough, chest tightness, shortness of breath and wheezing.    Cardiovascular:  Negative for chest pain.   Gastrointestinal:  Negative for abdominal pain, diarrhea, nausea and vomiting.   Genitourinary:         Mild vaginal itching that started yesterday no discharge

## 2025-05-15 ENCOUNTER — CLINICAL DOCUMENTATION (OUTPATIENT)
Age: 89
End: 2025-05-15

## 2025-05-15 ENCOUNTER — OFFICE VISIT (OUTPATIENT)
Age: 89
End: 2025-05-15
Payer: MEDICARE

## 2025-05-15 VITALS
BODY MASS INDEX: 30.96 KG/M2 | RESPIRATION RATE: 16 BRPM | OXYGEN SATURATION: 95 % | DIASTOLIC BLOOD PRESSURE: 51 MMHG | TEMPERATURE: 98.4 F | HEIGHT: 61 IN | SYSTOLIC BLOOD PRESSURE: 122 MMHG | HEART RATE: 57 BPM | WEIGHT: 164 LBS

## 2025-05-15 DIAGNOSIS — E11.22 TYPE 2 DIABETES MELLITUS WITH STAGE 3A CHRONIC KIDNEY DISEASE, WITH LONG-TERM CURRENT USE OF INSULIN (HCC): ICD-10-CM

## 2025-05-15 DIAGNOSIS — I10 ESSENTIAL HYPERTENSION: ICD-10-CM

## 2025-05-15 DIAGNOSIS — Z79.4 TYPE 2 DIABETES MELLITUS WITH STAGE 3A CHRONIC KIDNEY DISEASE, WITH LONG-TERM CURRENT USE OF INSULIN (HCC): ICD-10-CM

## 2025-05-15 DIAGNOSIS — R10.32 LLQ ABDOMINAL PAIN: ICD-10-CM

## 2025-05-15 DIAGNOSIS — N18.31 TYPE 2 DIABETES MELLITUS WITH STAGE 3A CHRONIC KIDNEY DISEASE, WITH LONG-TERM CURRENT USE OF INSULIN (HCC): ICD-10-CM

## 2025-05-15 DIAGNOSIS — K59.04 CHRONIC IDIOPATHIC CONSTIPATION: ICD-10-CM

## 2025-05-15 DIAGNOSIS — R10.32 LLQ ABDOMINAL PAIN: Primary | ICD-10-CM

## 2025-05-15 LAB
ALBUMIN SERPL-MCNC: 3.8 G/DL (ref 3.5–5)
ALBUMIN/GLOB SERPL: 0.9 (ref 1.1–2.2)
ALP SERPL-CCNC: 108 U/L (ref 45–117)
ALT SERPL-CCNC: 20 U/L (ref 12–78)
ANION GAP SERPL CALC-SCNC: 4 MMOL/L (ref 2–12)
APPEARANCE UR: CLEAR
AST SERPL-CCNC: 22 U/L (ref 15–37)
BACTERIA URNS QL MICRO: NEGATIVE /HPF
BASOPHILS # BLD: 0.03 K/UL (ref 0–0.1)
BASOPHILS NFR BLD: 0.5 % (ref 0–1)
BILIRUB SERPL-MCNC: 0.5 MG/DL (ref 0.2–1)
BILIRUB UR QL: NEGATIVE
BUN SERPL-MCNC: 33 MG/DL (ref 6–20)
BUN/CREAT SERPL: 16 (ref 12–20)
CALCIUM SERPL-MCNC: 10.4 MG/DL (ref 8.5–10.1)
CHLORIDE SERPL-SCNC: 106 MMOL/L (ref 97–108)
CO2 SERPL-SCNC: 30 MMOL/L (ref 21–32)
COLOR UR: ABNORMAL
CREAT SERPL-MCNC: 2.04 MG/DL (ref 0.55–1.02)
DIFFERENTIAL METHOD BLD: ABNORMAL
EOSINOPHIL # BLD: 0.16 K/UL (ref 0–0.4)
EOSINOPHIL NFR BLD: 2.5 % (ref 0–7)
EPITH CASTS URNS QL MICRO: ABNORMAL /LPF
ERYTHROCYTE [DISTWIDTH] IN BLOOD BY AUTOMATED COUNT: 15.7 % (ref 11.5–14.5)
EST. AVERAGE GLUCOSE BLD GHB EST-MCNC: 120 MG/DL
GLOBULIN SER CALC-MCNC: 4.3 G/DL (ref 2–4)
GLUCOSE SERPL-MCNC: 75 MG/DL (ref 65–100)
GLUCOSE UR STRIP.AUTO-MCNC: NEGATIVE MG/DL
HBA1C MFR BLD: 5.8 % (ref 4–5.6)
HCT VFR BLD AUTO: 35.9 % (ref 35–47)
HGB BLD-MCNC: 11.7 G/DL (ref 11.5–16)
HGB UR QL STRIP: NEGATIVE
HYALINE CASTS URNS QL MICRO: ABNORMAL /LPF (ref 0–5)
IMM GRANULOCYTES # BLD AUTO: 0.01 K/UL (ref 0–0.04)
IMM GRANULOCYTES NFR BLD AUTO: 0.2 % (ref 0–0.5)
KETONES UR QL STRIP.AUTO: ABNORMAL MG/DL
LEUKOCYTE ESTERASE UR QL STRIP.AUTO: ABNORMAL
LYMPHOCYTES # BLD: 3 K/UL (ref 0.8–3.5)
LYMPHOCYTES NFR BLD: 46.4 % (ref 12–49)
MCH RBC QN AUTO: 29 PG (ref 26–34)
MCHC RBC AUTO-ENTMCNC: 32.6 G/DL (ref 30–36.5)
MCV RBC AUTO: 89.1 FL (ref 80–99)
MONOCYTES # BLD: 0.59 K/UL (ref 0–1)
MONOCYTES NFR BLD: 9.1 % (ref 5–13)
NEUTS SEG # BLD: 2.68 K/UL (ref 1.8–8)
NEUTS SEG NFR BLD: 41.3 % (ref 32–75)
NITRITE UR QL STRIP.AUTO: NEGATIVE
NRBC # BLD: 0 K/UL (ref 0–0.01)
NRBC BLD-RTO: 0 PER 100 WBC
PH UR STRIP: 5.5 (ref 5–8)
PLATELET # BLD AUTO: 295 K/UL (ref 150–400)
PMV BLD AUTO: 10.4 FL (ref 8.9–12.9)
POTASSIUM SERPL-SCNC: 3.8 MMOL/L (ref 3.5–5.1)
PROT SERPL-MCNC: 8.1 G/DL (ref 6.4–8.2)
PROT UR STRIP-MCNC: NEGATIVE MG/DL
RBC # BLD AUTO: 4.03 M/UL (ref 3.8–5.2)
RBC #/AREA URNS HPF: ABNORMAL /HPF (ref 0–5)
SODIUM SERPL-SCNC: 140 MMOL/L (ref 136–145)
SP GR UR REFRACTOMETRY: 1.02 (ref 1–1.03)
SPECIMEN HOLD: NORMAL
UROBILINOGEN UR QL STRIP.AUTO: 1 EU/DL (ref 0.2–1)
WBC # BLD AUTO: 6.5 K/UL (ref 3.6–11)
WBC URNS QL MICRO: ABNORMAL /HPF (ref 0–4)

## 2025-05-15 PROCEDURE — 99214 OFFICE O/P EST MOD 30 MIN: CPT | Performed by: FAMILY MEDICINE

## 2025-05-15 PROCEDURE — 3044F HG A1C LEVEL LT 7.0%: CPT | Performed by: FAMILY MEDICINE

## 2025-05-15 PROCEDURE — 1123F ACP DISCUSS/DSCN MKR DOCD: CPT | Performed by: FAMILY MEDICINE

## 2025-05-15 PROCEDURE — 1159F MED LIST DOCD IN RCRD: CPT | Performed by: FAMILY MEDICINE

## 2025-05-15 PROCEDURE — 1125F AMNT PAIN NOTED PAIN PRSNT: CPT | Performed by: FAMILY MEDICINE

## 2025-05-15 RX ORDER — DOXYCYCLINE HYCLATE 100 MG
100 TABLET ORAL 2 TIMES DAILY
Qty: 20 TABLET | Refills: 0 | Status: SHIPPED | OUTPATIENT
Start: 2025-05-15 | End: 2025-05-25

## 2025-05-15 RX ORDER — KETOCONAZOLE 20 MG/G
CREAM TOPICAL
COMMUNITY
Start: 2025-04-03

## 2025-05-15 NOTE — PROGRESS NOTES
Chief Complaint   Patient presents with    Abdominal Pain     Left lower quad - increases with movement - onset last night    Hypertension    Diabetes    Cholesterol Problem    Follow-up Chronic Condition    Insomnia       \"Have you been to the ER, urgent care clinic since your last visit?  Hospitalized since your last visit?\"       4/3/25 -  fungal     “Have you seen or consulted any other health care providers outside of [facility] since your last visit?”      NEPHROLOGY - DR. SAAVEDRA            Click Here for Release of Records Request       There were no vitals filed for this visit.   Health Maintenance Due   Topic Date Due    DTaP/Tdap/Td vaccine (1 - Tdap) Never done    Shingles vaccine (1 of 2) Never done    Respiratory Syncytial Virus (RSV) Pregnant or age 60 yrs+ (1 - 1-dose 75+ series) Never done    COVID-19 Vaccine ( season) 2025        The patient, Modesta Interiano, identity was verified by name and .

## 2025-05-15 NOTE — PROGRESS NOTES
Modesta Interiano (:  1932) is a 89 y.o. female,Established patient, here for evaluation of the following chief complaint(s):  Abdominal Pain (Left lower quad - increases with movement - onset last night), Hypertension, Diabetes, Cholesterol Problem, Follow-up Chronic Condition, and Insomnia         Assessment & Plan  LLQ abdominal pain       Orders:    CBC with Auto Differential; Future    Comprehensive Metabolic Panel; Future    Urinalysis with Microscopic; Future    doxycycline hyclate (VIBRA-TABS) 100 MG tablet; Take 1 tablet by mouth 2 times daily for 10 days    Type 2 diabetes mellitus with stage 3a chronic kidney disease, with long-term current use of insulin ([facility])       Orders:    Hemoglobin A1C; Future    insulin NPH (NOVOLIN N) 100 UNIT/ML injection vial; 10 units sc  qam and 4 units sc qpm.  worry about hypoglycemia  Essential hypertension   Chronic, at goal (stable), continue current treatment plan         Chronic idiopathic constipation    To try MOM 2 tablespoons prn    Orders:    magnesium hydroxide (MILK OF MAGNESIA) 400 MG/5ML suspension; 2 tablespoons prn constipation.      Return in about 4 months (around 9/15/2025).       Subjective   HPI In for followup. Onset last night LLQ abdominal pain, worse when takes a deep breath. NO fever chills. No vomiting. Ate oatmeal this am. Does have some problems with constipation. Bowels did move yesterday. NO blood in stools. Some urinary frequency at night. No hematuria. Gets an occasional dizzy spell if sits still. Blood sugars usually run in 60s and 70s. Is down to14 units nph in am and 6 units qpm.     Review of Systems       Objective   Physical Exam  Cardiovascular:      Rate and Rhythm: Normal rate and regular rhythm.      Heart sounds: Normal heart sounds. No murmur heard.  Pulmonary:      Effort: Pulmonary effort is normal.      Breath sounds: Normal breath sounds.   Abdominal:      General: Abdomen is flat. Bowel sounds are normal.

## 2025-05-15 NOTE — ASSESSMENT & PLAN NOTE
Orders:    Hemoglobin A1C; Future    insulin NPH (NOVOLIN N) 100 UNIT/ML injection vial; 10 units sc  qam and 4 units sc qpm.  worry about hypoglycemia

## 2025-05-19 ENCOUNTER — RESULTS FOLLOW-UP (OUTPATIENT)
Age: 89
End: 2025-05-19

## 2025-05-19 NOTE — TELEPHONE ENCOUNTER
Pc with daughter . Continue current meds. Keep up apps with nephrology. Creatinine slightly higher.